# Patient Record
Sex: MALE | Race: WHITE | NOT HISPANIC OR LATINO | Employment: OTHER | URBAN - METROPOLITAN AREA
[De-identification: names, ages, dates, MRNs, and addresses within clinical notes are randomized per-mention and may not be internally consistent; named-entity substitution may affect disease eponyms.]

---

## 2018-02-02 ENCOUNTER — CONVERSION ENCOUNTER (OUTPATIENT)
Dept: FAMILY MEDICINE CLINIC | Facility: CLINIC | Age: 63
End: 2018-02-02

## 2018-02-02 LAB
ALBUMIN SERPL-MCNC: 3.5 G/DL (ref 3.6–5.1)
ALBUMIN/GLOB SERPL: ABNORMAL {RATIO} (ref 1–2.1)
ALP SERPL-CCNC: 67 UNITS/L (ref 40–115)
ALT SERPL-CCNC: 17 UNITS/L (ref 9–60)
AST SERPL-CCNC: 16 UNITS/L (ref 10–35)
BILIRUB SERPL-MCNC: 0.6 MG/DL (ref 0.2–1.2)
BUN SERPL-MCNC: 15 MG/DL (ref 7–25)
BUN/CREAT SERPL: ABNORMAL (ref 6–22)
CALCIUM SERPL-MCNC: 9.3 MG/DL (ref 8.6–10.2)
CHLORIDE SERPL-SCNC: 103 MMOL/L (ref 98–110)
CONV % HGB A1C: ABNORMAL %
CONV CO2: 26 MMOL/L (ref 21–33)
CONV TOTAL PROTEIN: 6.6 G/DL (ref 6.2–8.3)
CREAT UR-MCNC: 1.2 MG/DL (ref 0.76–1.46)
GLOBULIN UR ELPH-MCNC: ABNORMAL G/DL (ref 2.1–3.7)
GLUCOSE SERPL-MCNC: 95 MG/DL (ref 65–99)
POTASSIUM SERPL-SCNC: 4.8 MMOL/L (ref 3.5–5.3)
PSA SERPL-MCNC: 3.1 NG/ML
SODIUM SERPL-SCNC: 138 MMOL/L (ref 135–146)

## 2018-10-03 ENCOUNTER — HOSPITAL ENCOUNTER (OUTPATIENT)
Dept: ORTHOPEDIC SURGERY | Facility: CLINIC | Age: 63
Discharge: HOME | End: 2018-10-03
Attending: ORTHOPAEDIC SURGERY | Admitting: ORTHOPAEDIC SURGERY

## 2018-11-14 ENCOUNTER — HOSPITAL ENCOUNTER (OUTPATIENT)
Dept: ORTHOPEDIC SURGERY | Facility: CLINIC | Age: 63
Discharge: HOME | End: 2018-11-14
Attending: ORTHOPAEDIC SURGERY | Admitting: ORTHOPAEDIC SURGERY

## 2019-02-13 ENCOUNTER — HOSPITAL ENCOUNTER (OUTPATIENT)
Dept: ORTHOPEDIC SURGERY | Facility: CLINIC | Age: 64
Discharge: HOME | End: 2019-02-13
Attending: ORTHOPAEDIC SURGERY | Admitting: ORTHOPAEDIC SURGERY

## 2019-06-25 RX ORDER — BLOOD SUGAR DIAGNOSTIC
STRIP MISCELLANEOUS
COMMUNITY
Start: 2018-12-13

## 2019-06-25 RX ORDER — BENAZEPRIL HYDROCHLORIDE 40 MG/1
TABLET, FILM COATED ORAL
Qty: 30 TABLET | OUTPATIENT
Start: 2019-06-25

## 2019-06-25 RX ORDER — PRAVASTATIN SODIUM 20 MG
TABLET ORAL EVERY 24 HOURS
COMMUNITY
Start: 2019-02-14 | End: 2019-08-09 | Stop reason: SDUPTHER

## 2019-06-25 RX ORDER — HYDROCHLOROTHIAZIDE 25 MG/1
TABLET ORAL EVERY 24 HOURS
COMMUNITY
Start: 2018-05-19 | End: 2019-09-05 | Stop reason: SDUPTHER

## 2019-06-25 RX ORDER — PRAVASTATIN SODIUM 20 MG
TABLET ORAL
Qty: 90 TABLET | OUTPATIENT
Start: 2019-06-25

## 2019-06-25 RX ORDER — BENAZEPRIL HYDROCHLORIDE 40 MG/1
TABLET, FILM COATED ORAL EVERY 24 HOURS
COMMUNITY
Start: 2019-02-14 | End: 2019-06-27 | Stop reason: SDUPTHER

## 2019-06-25 RX ORDER — TAMSULOSIN HYDROCHLORIDE 0.4 MG/1
1 CAPSULE ORAL EVERY 24 HOURS
COMMUNITY
Start: 2017-03-04 | End: 2019-09-26 | Stop reason: SDUPTHER

## 2019-06-25 RX ORDER — CLOPIDOGREL BISULFATE 75 MG/1
TABLET ORAL EVERY 24 HOURS
COMMUNITY
Start: 2018-05-19 | End: 2020-02-03 | Stop reason: SDUPTHER

## 2019-06-25 RX ORDER — HUMAN INSULIN 100 [USP'U]/ML
INJECTION, SUSPENSION SUBCUTANEOUS
Qty: 30 ML | OUTPATIENT
Start: 2019-06-25

## 2019-06-25 RX ORDER — KETOCONAZOLE 20 MG/G
CREAM TOPICAL
COMMUNITY
Start: 2018-10-19 | End: 2021-06-13 | Stop reason: HOSPADM

## 2019-06-25 RX ORDER — TAMSULOSIN HYDROCHLORIDE 0.4 MG/1
CAPSULE ORAL
Qty: 30 CAPSULE | OUTPATIENT
Start: 2019-06-25

## 2019-06-25 RX ORDER — DOCUSATE SODIUM 100 MG/1
1 CAPSULE, LIQUID FILLED ORAL EVERY 24 HOURS
COMMUNITY
Start: 2019-02-14 | End: 2019-12-30 | Stop reason: SDUPTHER

## 2019-06-27 NOTE — TELEPHONE ENCOUNTER
Pt needing a refill of his benazepril 40mg, qd, out of medication, needs sent to [facility]. Upcoming appt with you on 7/8/19

## 2019-06-28 RX ORDER — BENAZEPRIL HYDROCHLORIDE 40 MG/1
40 TABLET, FILM COATED ORAL EVERY 24 HOURS
Qty: 30 TABLET | Refills: 0 | Status: SHIPPED | OUTPATIENT
Start: 2019-06-28 | End: 2019-10-04 | Stop reason: SDUPTHER

## 2019-07-08 ENCOUNTER — OFFICE VISIT (OUTPATIENT)
Dept: FAMILY MEDICINE CLINIC | Facility: CLINIC | Age: 64
End: 2019-07-08

## 2019-07-08 VITALS
SYSTOLIC BLOOD PRESSURE: 180 MMHG | BODY MASS INDEX: 35.93 KG/M2 | TEMPERATURE: 98.4 F | OXYGEN SATURATION: 97 % | WEIGHT: 251 LBS | DIASTOLIC BLOOD PRESSURE: 60 MMHG | HEIGHT: 70 IN

## 2019-07-08 DIAGNOSIS — I63.439 CEREBROVASCULAR ACCIDENT (CVA) DUE TO EMBOLISM OF POSTERIOR CEREBRAL ARTERY, UNSPECIFIED BLOOD VESSEL LATERALITY (HCC): ICD-10-CM

## 2019-07-08 DIAGNOSIS — Z12.5 SCREENING FOR MALIGNANT NEOPLASM OF PROSTATE: ICD-10-CM

## 2019-07-08 DIAGNOSIS — E78.2 MIXED HYPERLIPIDEMIA: ICD-10-CM

## 2019-07-08 DIAGNOSIS — F33.1 MODERATE EPISODE OF RECURRENT MAJOR DEPRESSIVE DISORDER (HCC): ICD-10-CM

## 2019-07-08 DIAGNOSIS — Z79.4 TYPE 2 DIABETES MELLITUS WITH COMPLICATION, WITH LONG-TERM CURRENT USE OF INSULIN (HCC): ICD-10-CM

## 2019-07-08 DIAGNOSIS — I25.119 CORONARY ARTERY DISEASE INVOLVING NATIVE CORONARY ARTERY OF NATIVE HEART WITH ANGINA PECTORIS (HCC): ICD-10-CM

## 2019-07-08 DIAGNOSIS — I10 BENIGN ESSENTIAL HYPERTENSION: Primary | ICD-10-CM

## 2019-07-08 DIAGNOSIS — E11.8 TYPE 2 DIABETES MELLITUS WITH COMPLICATION, WITH LONG-TERM CURRENT USE OF INSULIN (HCC): ICD-10-CM

## 2019-07-08 PROBLEM — I63.9 CEREBROVASCULAR ACCIDENT (CVA) (HCC): Status: ACTIVE | Noted: 2018-01-03

## 2019-07-08 PROBLEM — R26.9 ABNORMAL GAIT: Status: ACTIVE | Noted: 2017-12-04

## 2019-07-08 PROBLEM — Z13.89 ENCOUNTER FOR SCREENING FOR OTHER DISORDER: Status: RESOLVED | Noted: 2017-12-04 | Resolved: 2019-07-08

## 2019-07-08 PROBLEM — Z13.89 ENCOUNTER FOR SCREENING FOR OTHER DISORDER: Status: ACTIVE | Noted: 2017-12-04

## 2019-07-08 PROBLEM — E11.9 TYPE 2 DIABETES MELLITUS (HCC): Status: ACTIVE | Noted: 2019-07-08

## 2019-07-08 PROBLEM — H53.469 HOMONYMOUS HEMIANOPIA: Status: ACTIVE | Noted: 2017-12-04

## 2019-07-08 PROBLEM — I25.10 CORONARY ARTERY DISEASE: Status: ACTIVE | Noted: 2019-07-08

## 2019-07-08 PROBLEM — B35.1 ONYCHOMYCOSIS: Status: ACTIVE | Noted: 2018-10-19

## 2019-07-08 PROBLEM — G25.0 TREMOR, ESSENTIAL: Status: ACTIVE | Noted: 2017-12-04

## 2019-07-08 PROBLEM — E66.9 OBESITY: Status: ACTIVE | Noted: 2019-07-08

## 2019-07-08 PROBLEM — E78.5 HYPERLIPIDEMIA: Status: ACTIVE | Noted: 2019-07-08

## 2019-07-08 PROBLEM — F33.9 EPISODE OF RECURRENT MAJOR DEPRESSIVE DISORDER (HCC): Status: ACTIVE | Noted: 2019-07-08

## 2019-07-08 PROBLEM — M25.511 PAIN IN RIGHT SHOULDER: Status: ACTIVE | Noted: 2018-10-03

## 2019-07-08 PROBLEM — E53.8 VITAMIN B12 DEFICIENCY: Status: ACTIVE | Noted: 2018-02-01

## 2019-07-08 PROBLEM — Z23 NEED FOR PROPHYLACTIC VACCINATION AGAINST STREPTOCOCCUS PNEUMONIAE (PNEUMOCOCCUS): Status: ACTIVE | Noted: 2018-10-19

## 2019-07-08 PROBLEM — Z23 NEED FOR PROPHYLACTIC VACCINATION AGAINST STREPTOCOCCUS PNEUMONIAE (PNEUMOCOCCUS): Status: RESOLVED | Noted: 2018-10-19 | Resolved: 2019-07-08

## 2019-07-08 PROBLEM — M25.511 PAIN IN RIGHT SHOULDER: Status: RESOLVED | Noted: 2018-10-03 | Resolved: 2019-07-08

## 2019-07-08 PROBLEM — B35.1 ONYCHOMYCOSIS: Status: RESOLVED | Noted: 2018-10-19 | Resolved: 2019-07-08

## 2019-07-08 PROBLEM — G25.2 RESTING TREMOR: Status: ACTIVE | Noted: 2017-12-04

## 2019-07-08 PROCEDURE — 99214 OFFICE O/P EST MOD 30 MIN: CPT | Performed by: FAMILY MEDICINE

## 2019-07-08 RX ORDER — ASPIRIN 81 MG/1
81 TABLET ORAL DAILY
COMMUNITY

## 2019-07-08 RX ORDER — ESCITALOPRAM OXALATE 10 MG/1
10 TABLET ORAL DAILY
Qty: 30 TABLET | Refills: 0 | Status: SHIPPED | OUTPATIENT
Start: 2019-07-08 | End: 2019-10-04 | Stop reason: SDUPTHER

## 2019-07-08 NOTE — PROGRESS NOTES
Subjective   Isaias Molina is a 63 y.o. male.   Chief Complaint   Patient presents with   • Diabetes     f/u,         History of Present Illness   Pt is here to establish care.  Pt is f/u on his diabetes that was managed by Dr. Arellano. Pt states his sugars have been up slightly.  Pt does not keep a log.  No recent sx of hypoglycemia.   Pt is needing to get diabetic shoes. Pt sees Dr. Lea, podiatry.  No labs in long time.    Pt has had a stroke November 2017. Pt states has been experiencing balance issues when he first gets going.  Has vision impairment residual of CVA    CAD - Pt states that the last time he saw Dr. Arellano, he told her he stopped seeing his cardiologist .  Patient is now experiencing chest discomfort occasionally.  Pt states episodes once every couple weeks.  Pt states he cant pinpoint what triggers it, but he feels mostly it happens when he awakes in the morning.  Had a cath with stents x 2 in 2011.    HTN - has element of white coat HTN.  Was out of benazapril.  Back on it for a few days.    Depression - h/o this.  Took lexapro for 18 months.  Stopped it about 1 year ago.  Feelings of hopelessness, helplessness, self -doubt are recurring.  Grandchildren have severe kidney disease - 2nd is waiting on kidney transplant right now.  Feels it's time to get back on lexapro.    Patient Active Problem List    Diagnosis Date Noted   • Coronary artery disease 07/08/2019     Note Last Updated: 7/8/2019     On cath with stent x 2 in 2011     • Hyperlipidemia 07/08/2019   • Obesity 07/08/2019   • Type 2 diabetes mellitus (CMS/HCC) 07/08/2019   • Episode of recurrent major depressive disorder (CMS/[facility]) 07/08/2019   • Benign essential hypertension 02/01/2018   • Vitamin B12 deficiency 02/01/2018   • Cerebrovascular accident (CVA) (CMS/[facility]) 01/03/2018     Note Last Updated: 7/8/2019     Occipital.  Ischemic.  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   • Resting tremor 12/04/2017   • Tremor, essential 12/04/2017           Past Surgical History:   Procedure Laterality Date   • CATARACT EXTRACTION WITH INTRAOCULAR LENS IMPLANT Bilateral    • CORONARY ANGIOPLASTY WITH STENT PLACEMENT  2011    PTCA - LCx & RCA with stent   • ROTATOR CUFF REPAIR Right    • TOTAL SHOULDER REVERSE ARTHROPLASTY Right     after fracture 2018       Current Outpatient Medications:   •  aspirin 81 MG EC tablet, Take 81 mg by mouth Daily., Disp: , Rfl:   •  benazepril (LOTENSIN) 40 MG tablet, Take 1 tablet by mouth Daily., Disp: 30 tablet, Rfl: 0  •  clopidogrel (PLAVIX) 75 MG tablet, Daily., Disp: , Rfl:   •  Cyanocobalamin (B-12 COMPLIANCE INJECTION) 1000 MCG/ML kit, B-12 COMPLIANCE INJECTION 1000 MCG/ML KIT, Disp: , Rfl:   •  docusate sodium (COLACE) 100 MG capsule, 1 capsule Daily., Disp: , Rfl:   •  glucose blood (ACCU-CHEK ROLANDO) test strip, ACCU-CHEK ROLANDO IN VITRO STRIP, Disp: , Rfl:   •  hydrochlorothiazide (HYDRODIURIL) 25 MG tablet, Daily., Disp: , Rfl:   •  insulin NPH-insulin regular (NOVOLIN 70/30 RELION) (70-30) 100 UNIT/ML injection, NOVOLIN 70/30 RELION (70-30) 100 UNIT/ML SUSP, Disp: , Rfl:   •  Insulin Syringe-Needle U-100 (ULTICARE INSULIN SYRINGE) 31G X 5/16\" 0.5 ML misc, ULTICARE INSULIN SYRINGE 31G X 5/16\" 0.5 ML, Disp: , Rfl:   •  ketoconazole (NIZORAL) 2 % cream, KETOCONAZOLE 2 % CREA, Disp: , Rfl:   •  metFORMIN (GLUCOPHAGE) 500 MG tablet, Take 500 mg by mouth 2 (Two) Times a Day With Meals., Disp: , Rfl:   •  pravastatin (PRAVACHOL) 20 MG tablet, Daily., Disp: , Rfl:   •  tamsulosin (FLOMAX) 0.4 MG capsule 24 hr capsule, 1 capsule Daily., Disp: , Rfl:   •  escitalopram (LEXAPRO) 10 MG tablet, Take 1 tablet by mouth Daily., Disp: 30 tablet, Rfl: 0  Allergies   Allergen Reactions   • Shellfish Allergy Nausea And Vomiting     Social History     Socioeconomic History   • Marital status:      Spouse name: Not on file   • Number of children: " "Not on file   • Years of education: Not on file   • Highest education level: Not on file   Tobacco Use   • Smoking status: Never Smoker   • Smokeless tobacco: Never Used     Family History   Problem Relation Age of Onset   • Cerebral aneurysm Father    • Peripheral vascular disease Brother    • Diabetes type II Brother      The following portions of the patient's history were reviewed and updated as appropriate: allergies, current medications, past family history, past medical history, past social history, past surgical history and problem list.    Review of Systems   Constitutional: Positive for fatigue. Negative for chills, diaphoresis and fever.   HENT: Negative for ear pain and sore throat.    Eyes: Positive for blurred vision and visual disturbance.   Respiratory: Negative for cough, choking, shortness of breath and wheezing.    Cardiovascular: Positive for chest pain (fleeting - occurs random times). Negative for palpitations and leg swelling.   Gastrointestinal: Negative for abdominal pain, diarrhea, nausea and vomiting.   Musculoskeletal: Positive for gait problem. Negative for myalgias.   Skin: Negative for rash.   Neurological: Negative for facial asymmetry, headache, memory problem and confusion.   Hematological: Bruises/bleeds easily (med related).   Psychiatric/Behavioral: Positive for depressed mood. Negative for hallucinations and stress.       Objective   /60 (BP Location: Right arm, Patient Position: Sitting, Cuff Size: Adult)   Temp 98.4 °F (36.9 °C) (Oral)   Ht 177.8 cm (70\")   Wt 114 kg (251 lb)   SpO2 97%   BMI 36.01 kg/m²   Physical Exam   Constitutional: He is oriented to person, place, and time. He appears well-developed and well-nourished.   HENT:   Head: Normocephalic and atraumatic.   Mouth/Throat: Oropharynx is clear and moist.   Eyes: Conjunctivae and EOM are normal. Pupils are equal, round, and reactive to light.   Neck: Neck supple. No JVD present. No thyromegaly present. "   Cardiovascular: Normal rate, regular rhythm, normal heart sounds and intact distal pulses.   No murmur heard.  Pulmonary/Chest: Effort normal and breath sounds normal. No respiratory distress. He has no wheezes. He has no rales. He exhibits no tenderness.   Abdominal: Soft. He exhibits no distension and no mass. There is no tenderness. There is no rebound and no guarding.   Musculoskeletal: Normal range of motion. He exhibits no edema.    Isaias had a diabetic foot exam performed today.   During the foot exam he had a monofilament test performed.    Neurological Sensory Findings -  Altered sharp/dull right ankle/foot discrimination and altered sharp/dull left ankle/foot discrimination.  Vascular Status -  His right foot exhibits abnormal foot vasculature  (pulses only 1+ ). His right foot exhibits no edema. His left foot exhibits abnormal foot vasculature  (pulses only 1+ ). His left foot exhibits no edema.  Skin Integrity  -  His right foot skin is intact.His left foot skin is intact (some thin callous over heels developing)..  Lymphadenopathy:     He has no cervical adenopathy.   Neurological: He is alert and oriented to person, place, and time.   Skin: Skin is warm. No rash noted.   Psychiatric: He has a normal mood and affect. His behavior is normal.               Assessment/Plan   Diagnoses and all orders for this visit:    1. Benign essential hypertension (Primary)  -     Comprehensive Metabolic Panel    2. Cerebrovascular accident (CVA) due to embolism of posterior cerebral artery, unspecified blood vessel laterality (CMS/HCC)    3. Coronary artery disease involving native coronary artery of native heart with angina pectoris (CMS/HCC)  -     CBC & Differential  -     Ambulatory Referral to Cardiology    4. Mixed hyperlipidemia    5. Type 2 diabetes mellitus with complication, with long-term current use of insulin (CMS/HCC)  -     Hemoglobin A1c  -     Lipid Panel  -     TSH  -     MicroAlbumin, Urine,  Random - Urine, Clean Catch    6. Moderate episode of recurrent major depressive disorder (CMS/HCC)  -     escitalopram (LEXAPRO) 10 MG tablet; Take 1 tablet by mouth Daily.  Dispense: 30 tablet; Refill: 0    7. Screening for malignant neoplasm of prostate  -     PSA SCREENING; Future    Keep checking blood pressure at home.  Call if systolic blood pressure stays above 130.  Continue all current medications.  Check labs as above.  Initiate referral to cardiology.  His stents are approaching 10 years old.  Restart lispro 10 mg/day.  Recheck with me here in 1 month to reevaluate his mood.  Follow-up by phone with results of his labs.  By medic follow-up with me in 3 months.  Forms completed for diabetic shoes attached elsewhere in the chart.  Call for problems or concerns before next visit.             Return in about 4 weeks (around 8/5/2019).

## 2019-07-09 LAB
ALBUMIN SERPL-MCNC: 3.5 G/DL (ref 3.6–4.8)
ALBUMIN/GLOB SERPL: 1 {RATIO} (ref 1.2–2.2)
ALP SERPL-CCNC: 107 IU/L (ref 39–117)
ALT SERPL-CCNC: 15 IU/L (ref 0–44)
AST SERPL-CCNC: 18 IU/L (ref 0–40)
BASOPHILS # BLD AUTO: 0.1 X10E3/UL (ref 0–0.2)
BASOPHILS NFR BLD AUTO: 0 %
BILIRUB SERPL-MCNC: 0.4 MG/DL (ref 0–1.2)
BUN SERPL-MCNC: 22 MG/DL (ref 8–27)
BUN/CREAT SERPL: 14 (ref 10–24)
CALCIUM SERPL-MCNC: 9 MG/DL (ref 8.6–10.2)
CHLORIDE SERPL-SCNC: 102 MMOL/L (ref 96–106)
CHOLEST SERPL-MCNC: 331 MG/DL (ref 100–199)
CO2 SERPL-SCNC: 18 MMOL/L (ref 20–29)
CREAT SERPL-MCNC: 1.62 MG/DL (ref 0.76–1.27)
EOSINOPHIL # BLD AUTO: 0.4 X10E3/UL (ref 0–0.4)
EOSINOPHIL NFR BLD AUTO: 4 %
ERYTHROCYTE [DISTWIDTH] IN BLOOD BY AUTOMATED COUNT: 15 % (ref 12.3–15.4)
GLOBULIN SER CALC-MCNC: 3.5 G/DL (ref 1.5–4.5)
GLUCOSE SERPL-MCNC: 171 MG/DL (ref 65–99)
HBA1C MFR BLD: 7.6 % (ref 4.8–5.6)
HCT VFR BLD AUTO: 44.3 % (ref 37.5–51)
HDLC SERPL-MCNC: 23 MG/DL
HGB BLD-MCNC: 15.2 G/DL (ref 13–17.7)
IMM GRANULOCYTES # BLD AUTO: 0.2 X10E3/UL (ref 0–0.1)
IMM GRANULOCYTES NFR BLD AUTO: 2 %
LDLC SERPL CALC-MCNC: ABNORMAL MG/DL (ref 0–99)
LYMPHOCYTES # BLD AUTO: 2.6 X10E3/UL (ref 0.7–3.1)
LYMPHOCYTES NFR BLD AUTO: 23 %
MCH RBC QN AUTO: 28.6 PG (ref 26.6–33)
MCHC RBC AUTO-ENTMCNC: 34.3 G/DL (ref 31.5–35.7)
MCV RBC AUTO: 83 FL (ref 79–97)
MICROALBUMIN UR-MCNC: 4677.7 UG/ML
MONOCYTES # BLD AUTO: 0.8 X10E3/UL (ref 0.1–0.9)
MONOCYTES NFR BLD AUTO: 7 %
NEUTROPHILS # BLD AUTO: 7.3 X10E3/UL (ref 1.4–7)
NEUTROPHILS NFR BLD AUTO: 64 %
PLATELET # BLD AUTO: 227 X10E3/UL (ref 150–450)
POTASSIUM SERPL-SCNC: 4.6 MMOL/L (ref 3.5–5.2)
PROT SERPL-MCNC: 7 G/DL (ref 6–8.5)
RBC # BLD AUTO: 5.31 X10E6/UL (ref 4.14–5.8)
SODIUM SERPL-SCNC: 140 MMOL/L (ref 134–144)
TRIGL SERPL-MCNC: 1096 MG/DL (ref 0–149)
TSH SERPL DL<=0.005 MIU/L-ACNC: 7.06 UIU/ML (ref 0.45–4.5)
VLDLC SERPL CALC-MCNC: ABNORMAL MG/DL (ref 5–40)
WBC # BLD AUTO: 11.3 X10E3/UL (ref 3.4–10.8)

## 2019-07-13 ENCOUNTER — RESULTS ENCOUNTER (OUTPATIENT)
Dept: FAMILY MEDICINE CLINIC | Facility: CLINIC | Age: 64
End: 2019-07-13

## 2019-07-13 DIAGNOSIS — Z12.5 SCREENING FOR MALIGNANT NEOPLASM OF PROSTATE: ICD-10-CM

## 2019-08-09 DIAGNOSIS — E11.8 TYPE 2 DIABETES MELLITUS WITH COMPLICATION, WITH LONG-TERM CURRENT USE OF INSULIN (HCC): ICD-10-CM

## 2019-08-09 DIAGNOSIS — Z79.4 TYPE 2 DIABETES MELLITUS WITH COMPLICATION, WITH LONG-TERM CURRENT USE OF INSULIN (HCC): ICD-10-CM

## 2019-08-09 RX ORDER — HUMAN INSULIN 100 [USP'U]/ML
INJECTION, SUSPENSION SUBCUTANEOUS
Qty: 30 ML | Refills: 5 | Status: SHIPPED | OUTPATIENT
Start: 2019-08-09 | End: 2020-08-17

## 2019-08-09 RX ORDER — PRAVASTATIN SODIUM 20 MG
TABLET ORAL
Qty: 90 TABLET | Refills: 3 | Status: SHIPPED | OUTPATIENT
Start: 2019-08-09 | End: 2020-01-20

## 2019-09-06 RX ORDER — HYDROCHLOROTHIAZIDE 25 MG/1
TABLET ORAL
Qty: 90 TABLET | Refills: 3 | Status: SHIPPED | OUTPATIENT
Start: 2019-09-06 | End: 2020-12-10

## 2019-09-10 ENCOUNTER — OFFICE VISIT (OUTPATIENT)
Dept: CARDIOLOGY | Facility: CLINIC | Age: 64
End: 2019-09-10

## 2019-09-10 VITALS
WEIGHT: 251 LBS | OXYGEN SATURATION: 96 % | BODY MASS INDEX: 35.93 KG/M2 | SYSTOLIC BLOOD PRESSURE: 151 MMHG | DIASTOLIC BLOOD PRESSURE: 86 MMHG | HEART RATE: 106 BPM | HEIGHT: 70 IN

## 2019-09-10 DIAGNOSIS — I63.419 CEREBROVASCULAR ACCIDENT (CVA) DUE TO EMBOLISM OF MIDDLE CEREBRAL ARTERY, UNSPECIFIED BLOOD VESSEL LATERALITY (HCC): ICD-10-CM

## 2019-09-10 DIAGNOSIS — E78.00 PURE HYPERCHOLESTEROLEMIA: ICD-10-CM

## 2019-09-10 DIAGNOSIS — I25.119 CORONARY ARTERY DISEASE INVOLVING NATIVE CORONARY ARTERY OF NATIVE HEART WITH ANGINA PECTORIS (HCC): Primary | ICD-10-CM

## 2019-09-10 DIAGNOSIS — I20.9 ANGINA PECTORIS (HCC): ICD-10-CM

## 2019-09-10 DIAGNOSIS — E11.9 TYPE 2 DIABETES MELLITUS WITHOUT COMPLICATION, WITHOUT LONG-TERM CURRENT USE OF INSULIN (HCC): ICD-10-CM

## 2019-09-10 DIAGNOSIS — I10 ESSENTIAL HYPERTENSION: ICD-10-CM

## 2019-09-10 PROCEDURE — 99214 OFFICE O/P EST MOD 30 MIN: CPT | Performed by: INTERNAL MEDICINE

## 2019-09-10 NOTE — PROGRESS NOTES
"    Subjective:     Encounter Date:09/10/2019      Patient ID: Isaias Molina is a 63 y.o. male.    Chief Complaint:  History of Present Illness 63-year-old white male with history of coronary status post stent placement history of hypertension diabetes hyperlipidemia CVA in the past presented to my office for a new consultation.  Patient has been having symptoms of shortness of breath especially with exertion.  No complaints of any chest pain but has some tightness on occasions.  No complaints of any PND orthopnea.  No palpitation but has some dizziness.  No syncope or swelling of the feet.  Patient has been taking his medicines regularly.  He is legally blind and does not exercise very well.  He follows a good diet.  He does not smoke.  /86 (BP Location: Left arm, Patient Position: Sitting)   Pulse 106   Ht 177.8 cm (70\")   Wt 114 kg (251 lb)   SpO2 96%   BMI 36.01 kg/m²     The following portions of the patient's history were reviewed and updated as appropriate: allergies, current medications, past family history, past medical history, past social history, past surgical history and problem list.  Past Medical History:   Diagnosis Date   • Cellulitis 10/15/2013   • Encounter for screening for malignant neoplasm of prostate 12/4/2017   • Encounter for screening for other disorder 12/4/2017   • Need for prophylactic vaccination against Streptococcus pneumoniae (pneumococcus) 10/19/2018   • Onychomycosis 10/19/2018   • Pain in right shoulder 10/3/2018     Past Surgical History:   Procedure Laterality Date   • CATARACT EXTRACTION WITH INTRAOCULAR LENS IMPLANT Bilateral    • CORONARY ANGIOPLASTY WITH STENT PLACEMENT  2011    PTCA - LCx & RCA with stent   • ROTATOR CUFF REPAIR Right    • TOTAL SHOULDER REVERSE ARTHROPLASTY Right     after fracture 2018     Social History     Socioeconomic History   • Marital status:      Spouse name: Not on file   • Number of children: Not on file   • Years of " "education: Not on file   • Highest education level: Not on file   Tobacco Use   • Smoking status: Never Smoker   • Smokeless tobacco: Never Used     Family History   Problem Relation Age of Onset   • Cerebral aneurysm Father    • Peripheral vascular disease Brother    • Diabetes type II Brother        Current Outpatient Medications:   •  aspirin 81 MG EC tablet, Take 81 mg by mouth Daily., Disp: , Rfl:   •  benazepril (LOTENSIN) 40 MG tablet, Take 1 tablet by mouth Daily., Disp: 30 tablet, Rfl: 0  •  clopidogrel (PLAVIX) 75 MG tablet, Daily., Disp: , Rfl:   •  Cyanocobalamin (B-12 COMPLIANCE INJECTION) 1000 MCG/ML kit, B-12 COMPLIANCE INJECTION 1000 MCG/ML KIT, Disp: , Rfl:   •  docusate sodium (COLACE) 100 MG capsule, 1 capsule Daily., Disp: , Rfl:   •  escitalopram (LEXAPRO) 10 MG tablet, Take 1 tablet by mouth Daily., Disp: 30 tablet, Rfl: 0  •  glucose blood (ACCU-CHEK ROLANDO) test strip, ACCU-CHEK ROLANDO IN VITRO STRIP, Disp: , Rfl:   •  hydrochlorothiazide (HYDRODIURIL) 25 MG tablet, TAKE ONE TABLET BY MOUTH EVERY DAY, Disp: 90 tablet, Rfl: 3  •  Insulin Syringe-Needle U-100 (ULTICARE INSULIN SYRINGE) 31G X 5/16\" 0.5 ML misc, ULTICARE INSULIN SYRINGE 31G X 5/16\" 0.5 ML, Disp: , Rfl:   •  ketoconazole (NIZORAL) 2 % cream, KETOCONAZOLE 2 % CREA, Disp: , Rfl:   •  metFORMIN (GLUCOPHAGE) 500 MG tablet, TAKE ONE TABLET BY MOUTH TWICE DAILY with meals, Disp: 180 tablet, Rfl: 3  •  NOVOLIN 70/30 (70-30) 100 UNIT/ML injection, inject 50 units SUBCUTANEOUSLY EVERY MORNING AND 40 units SUBCUTANEOUSLY EVERY EVENING, Disp: 30 mL, Rfl: 5  •  pravastatin (PRAVACHOL) 20 MG tablet, TAKE ONE TABLET BY MOUTH AT BEDTIME, Disp: 90 tablet, Rfl: 3  •  tamsulosin (FLOMAX) 0.4 MG capsule 24 hr capsule, 1 capsule Daily., Disp: , Rfl:   Allergies   Allergen Reactions   • Shellfish Allergy Nausea And Vomiting       Review of Systems   Constitution: Negative for fever and malaise/fatigue.   HENT: Negative for ear pain and nosebleeds.  "   Eyes: Negative for blurred vision and double vision.   Cardiovascular: Negative for chest pain, dyspnea on exertion and palpitations.   Respiratory: Positive for shortness of breath. Negative for cough.    Skin: Negative for rash.   Musculoskeletal: Negative for joint pain.   Gastrointestinal: Negative for abdominal pain, nausea and vomiting.   Neurological: Positive for dizziness and light-headedness. Negative for focal weakness and headaches.   Psychiatric/Behavioral: Negative for depression. The patient is not nervous/anxious.    All other systems reviewed and are negative.             Objective:     Physical Exam   Constitutional: He appears well-developed and well-nourished.   HENT:   Head: Normocephalic and atraumatic.   Eyes: No scleral icterus.   Patient is legally blind   Neck: Normal range of motion. Neck supple. No JVD present. Carotid bruit is not present.   Cardiovascular: Normal rate, regular rhythm, S1 normal, S2 normal, normal heart sounds and intact distal pulses. PMI is not displaced.   Pulmonary/Chest: Effort normal and breath sounds normal. He has no wheezes. He has no rales.   Abdominal: Soft. Bowel sounds are normal.   Musculoskeletal: Normal range of motion.   Neurological: He is alert. He has normal strength.   No focal deficits   Skin: Skin is warm and dry. No rash noted.   Psychiatric: He has a normal mood and affect.       Procedures    Lab Review:       Assessment:          Diagnosis Plan   1. Coronary artery disease involving native coronary artery of native heart with angina pectoris (CMS/[facility])     2. Essential hypertension     3. Pure hypercholesterolemia     4. Type 2 diabetes mellitus without complication, without long-term current use of insulin (CMS/[facility])     5. Angina pectoris (CMS/HCC)     6. Cerebrovascular accident (CVA) due to embolism of middle cerebral artery, unspecified blood vessel laterality (CMS/[facility])            Plan:       Patient has been having symptoms of shortness  of breath which are equal to angina because of his diabetes  Patient will have an echocardiogram and a stress my study to rule out ischemia  Patient has history of coronary artery stent placement in the past  Patient's sugar levels are followed by primary care doctor per  Patient's blood pressure and heart are stable per  Patient also had a CVA in the past with blindness.  Patient's lipid levels are followed by primary care doctor

## 2019-09-26 RX ORDER — TAMSULOSIN HYDROCHLORIDE 0.4 MG/1
CAPSULE ORAL
Qty: 30 CAPSULE | Refills: 0 | Status: SHIPPED | OUTPATIENT
Start: 2019-09-26 | End: 2019-11-06 | Stop reason: SDUPTHER

## 2019-10-04 DIAGNOSIS — F33.1 MODERATE EPISODE OF RECURRENT MAJOR DEPRESSIVE DISORDER (HCC): ICD-10-CM

## 2019-10-04 RX ORDER — ESCITALOPRAM OXALATE 10 MG/1
TABLET ORAL
Qty: 30 TABLET | Refills: 0 | Status: SHIPPED | OUTPATIENT
Start: 2019-10-04 | End: 2019-11-06 | Stop reason: SDUPTHER

## 2019-10-04 RX ORDER — BENAZEPRIL HYDROCHLORIDE 40 MG/1
TABLET, FILM COATED ORAL
Qty: 30 TABLET | Refills: 0 | Status: SHIPPED | OUTPATIENT
Start: 2019-10-04 | End: 2020-04-29 | Stop reason: SDUPTHER

## 2019-11-06 DIAGNOSIS — F33.1 MODERATE EPISODE OF RECURRENT MAJOR DEPRESSIVE DISORDER (HCC): ICD-10-CM

## 2019-11-06 RX ORDER — TAMSULOSIN HYDROCHLORIDE 0.4 MG/1
CAPSULE ORAL
Qty: 30 CAPSULE | Refills: 0 | Status: SHIPPED | OUTPATIENT
Start: 2019-11-06 | End: 2019-12-26

## 2019-11-06 RX ORDER — ESCITALOPRAM OXALATE 10 MG/1
TABLET ORAL
Qty: 30 TABLET | Refills: 0 | Status: SHIPPED | OUTPATIENT
Start: 2019-11-06 | End: 2019-12-26

## 2019-11-06 NOTE — TELEPHONE ENCOUNTER
Received refill request for patient's Lexapro and Flomax.     Last OV: 09-10-19  Next OV: none scheduled  Last Labs: 7-8-19    Meds are loaded and ready to send once approved. Thanks.

## 2019-11-06 NOTE — TELEPHONE ENCOUNTER
Please call patient and have him schedule appointment within a month.  I have not seen him since July, I will continue to refill medicines unless I am able to see him.  Thanks

## 2019-11-08 NOTE — TELEPHONE ENCOUNTER
Called patient on [phone]. No answer. Unable to LVM as to where phone just kept ringing.     Called [phone], which is patient's son's [place] phone #. Per HIPAA, may leave VM.  left advising for [place] to let patient know that patient is needing to schedule an appt within the next month with provider and to call office at earliest convenience.

## 2019-11-25 ENCOUNTER — OFFICE VISIT (OUTPATIENT)
Dept: FAMILY MEDICINE CLINIC | Facility: CLINIC | Age: 64
End: 2019-11-25

## 2019-11-25 VITALS
HEART RATE: 74 BPM | WEIGHT: 249 LBS | OXYGEN SATURATION: 98 % | RESPIRATION RATE: 18 BRPM | HEIGHT: 70 IN | SYSTOLIC BLOOD PRESSURE: 184 MMHG | TEMPERATURE: 98 F | DIASTOLIC BLOOD PRESSURE: 90 MMHG | BODY MASS INDEX: 35.65 KG/M2

## 2019-11-25 DIAGNOSIS — F33.1 MODERATE EPISODE OF RECURRENT MAJOR DEPRESSIVE DISORDER (HCC): ICD-10-CM

## 2019-11-25 DIAGNOSIS — N18.30 CHRONIC KIDNEY INSUFFICIENCY, STAGE 3 (MODERATE) (HCC): ICD-10-CM

## 2019-11-25 DIAGNOSIS — Z23 NEED FOR PROPHYLACTIC VACCINATION AND INOCULATION AGAINST INFLUENZA: ICD-10-CM

## 2019-11-25 DIAGNOSIS — Z12.5 ENCOUNTER FOR PROSTATE CANCER SCREENING: ICD-10-CM

## 2019-11-25 DIAGNOSIS — I10 BENIGN ESSENTIAL HYPERTENSION: ICD-10-CM

## 2019-11-25 DIAGNOSIS — E11.65 TYPE 2 DIABETES MELLITUS WITH HYPERGLYCEMIA, WITH LONG-TERM CURRENT USE OF INSULIN (HCC): Primary | ICD-10-CM

## 2019-11-25 DIAGNOSIS — E78.2 MIXED HYPERLIPIDEMIA: ICD-10-CM

## 2019-11-25 DIAGNOSIS — I25.119 CORONARY ARTERY DISEASE INVOLVING NATIVE CORONARY ARTERY OF NATIVE HEART WITH ANGINA PECTORIS (HCC): ICD-10-CM

## 2019-11-25 DIAGNOSIS — Z79.4 TYPE 2 DIABETES MELLITUS WITH HYPERGLYCEMIA, WITH LONG-TERM CURRENT USE OF INSULIN (HCC): Primary | ICD-10-CM

## 2019-11-25 PROCEDURE — 99214 OFFICE O/P EST MOD 30 MIN: CPT | Performed by: FAMILY MEDICINE

## 2019-11-25 PROCEDURE — 90674 CCIIV4 VAC NO PRSV 0.5 ML IM: CPT | Performed by: FAMILY MEDICINE

## 2019-11-25 PROCEDURE — 90471 IMMUNIZATION ADMIN: CPT | Performed by: FAMILY MEDICINE

## 2019-11-25 RX ORDER — AMLODIPINE BESYLATE 5 MG/1
5 TABLET ORAL DAILY
Qty: 30 TABLET | Refills: 3 | Status: SHIPPED | OUTPATIENT
Start: 2019-11-25 | End: 2020-04-29 | Stop reason: SDUPTHER

## 2019-11-25 NOTE — PROGRESS NOTES
Subjective   Isaias Molina is a 64 y.o. male.   Chief Complaint   Patient presents with   • Hypertension     Does not check blood pressure at home.   • Hyperlipidemia     Continues on Pravastatin    • Diabetes     Patient states that he checks BG twice weekly. Unable to recall lowest reading. Highest reading was a little over 300   • Depression     Continues on Lexapro. States that he feels it could do better some days.        History of Present Illness   Comes in today for follow-up.  Schedule says follow-up chronic problems.  I saw him back in July one and only time.  He did not follow-up in August.  Have had a couple refill requests since then and the labs that we did in July had some abnormalities.  Given all of that, I thought it best to have him come back in.    He has hypertension- does not check his blood pressure at home (machine not working).  Says he is doing fine with the medicines.  SBP usually 170-180    Hyperlipidemia- tolerating the pravastatin without myalgias.  Last labs appear to have been done back in July.  At that time total cholesterol was 331, triglycerides were 1096 and LDL was incalculable.     Elevated creatinine-creatinine was 1.62 back in July.  I asked him to come back in so we could talk about this.    Diabetes type 2.  Checks his blood sugar twice a week.  Last A1c was in July and it was 7.6.    Depression- still some feelings of feeling down despite Lexapro.  He attributes a lot of this to ongoing deficiencies in his vision and mobility due to his stroke, and chronic illnesses that his grandchildren face.    CAD - just had stress test (normal) and echo showed mild AI,MI,TI.      He had a lot of labs done August 2018, that was the last time he was hospitalized.    Would like to have a PSA done to screen for prostate cancer.      Patient Active Problem List    Diagnosis Date Noted   • Chronic kidney insufficiency, stage 3 (moderate) (CMS/HCC) 11/25/2019     Note Last Updated:  "11/25/2019     ? Due to diabetic nephropathy     • Coronary artery disease 07/08/2019     Note Last Updated: 7/8/2019     On cath with stent x 2 in 2011     • Mixed hyperlipidemia 07/08/2019   • Obesity 07/08/2019   • Type 2 diabetes mellitus (CMS/[facility]) 07/08/2019   • Episode of recurrent major depressive disorder (CMS/[facility]) 07/08/2019   • Benign essential hypertension 02/01/2018   • Vitamin B12 deficiency 02/01/2018   • Cerebrovascular accident (CVA) (CMS/[facility]) 01/03/2018     Note Last Updated: 7/8/2019     Occipital.  Ischemic.  2017 - residual vision impairment     • Abnormal gait 12/04/2017   • Homonymous hemianopia 12/04/2017   • Resting tremor 12/04/2017   • Tremor, essential 12/04/2017           Past Surgical History:   Procedure Laterality Date   • CATARACT EXTRACTION WITH INTRAOCULAR LENS IMPLANT Bilateral    • CORONARY ANGIOPLASTY WITH STENT PLACEMENT  2011    PTCA - LCx & RCA with stent   • ROTATOR CUFF REPAIR Right    • TOTAL SHOULDER REVERSE ARTHROPLASTY Right     after fracture 2018     Current Outpatient Medications on File Prior to Visit   Medication Sig   • aspirin 81 MG EC tablet Take 81 mg by mouth Daily.   • benazepril (LOTENSIN) 40 MG tablet TAKE ONE TABLET BY MOUTH EVERY DAY   • clopidogrel (PLAVIX) 75 MG tablet Daily.   • Cyanocobalamin (B-12 COMPLIANCE INJECTION) 1000 MCG/ML kit B-12 COMPLIANCE INJECTION 1000 MCG/ML KIT   • docusate sodium (COLACE) 100 MG capsule 1 capsule Daily.   • escitalopram (LEXAPRO) 10 MG tablet TAKE ONE TABLET BY MOUTH EVERY DAY   • glucose blood (ACCU-CHEK ROLANDO) test strip ACCU-CHEK ROLANDO IN VITRO STRIP   • hydrochlorothiazide (HYDRODIURIL) 25 MG tablet TAKE ONE TABLET BY MOUTH EVERY DAY   • Insulin Syringe-Needle U-100 (ULTICARE INSULIN SYRINGE) 31G X 5/16\" 0.5 ML misc ULTICARE INSULIN SYRINGE 31G X 5/16\" 0.5 ML   • ketoconazole (NIZORAL) 2 % cream KETOCONAZOLE 2 % CREA   • metFORMIN (GLUCOPHAGE) 500 MG tablet TAKE ONE TABLET BY MOUTH TWICE DAILY with meals   • " "NOVOLIN 70/30 (70-30) 100 UNIT/ML injection inject 50 units SUBCUTANEOUSLY EVERY MORNING AND 40 units SUBCUTANEOUSLY EVERY EVENING   • pravastatin (PRAVACHOL) 20 MG tablet TAKE ONE TABLET BY MOUTH AT BEDTIME   • tamsulosin (FLOMAX) 0.4 MG capsule 24 hr capsule TAKE ONE CAPSULE BY MOUTH EVERY DAY     No current facility-administered medications on file prior to visit.      Allergies   Allergen Reactions   • Shellfish Allergy Nausea And Vomiting     Social History     Socioeconomic History   • Marital status:      Spouse name: Not on file   • Number of children: Not on file   • Years of education: Not on file   • Highest education level: Not on file   Tobacco Use   • Smoking status: Never Smoker   • Smokeless tobacco: Never Used     Family History   Problem Relation Age of Onset   • Cerebral aneurysm Father    • Peripheral vascular disease Brother    • Diabetes type II Brother      The following portions of the patient's history were reviewed and updated as appropriate: allergies, current medications, past family history, past medical history, past social history, past surgical history and problem list.    Review of Systems   Constitutional: Positive for fatigue. Negative for chills and fever.   Respiratory: Negative for shortness of breath.    Cardiovascular: Negative for chest pain and leg swelling.   Gastrointestinal: Negative for abdominal pain, constipation, diarrhea, vomiting and GERD.   Genitourinary: Positive for difficulty urinating (hesitancy) and nocturia. Negative for dysuria and hematuria.       Objective   BP (!) 184/90 (BP Location: Right arm, Patient Position: Sitting, Cuff Size: Adult)   Pulse 74   Temp 98 °F (36.7 °C)   Resp 18   Ht 177.8 cm (70\")   Wt 113 kg (249 lb)   SpO2 98%   BMI 35.73 kg/m²   Physical Exam   Constitutional: He is oriented to person, place, and time. He appears well-developed and well-nourished.   Slightly disheveled.  Glasses are missing the temple on the right " side.   HENT:   Head: Normocephalic and atraumatic.   Eyes: Conjunctivae and EOM are normal.   Neck: Normal range of motion.   Cardiovascular: Normal rate, regular rhythm and normal heart sounds.   No murmur heard.  Pulmonary/Chest: Effort normal and breath sounds normal. No respiratory distress.   Musculoskeletal: Normal range of motion.   Slow, shuffling gait.   Neurological: He is alert and oriented to person, place, and time.   Skin: Skin is warm and dry. No rash noted.   Psychiatric: He has a normal mood and affect. His behavior is normal.         No visits with results within 4 Month(s) from this visit.   Latest known visit with results is:   Office Visit on 07/08/2019   Component Date Value Ref Range Status   • Glucose 07/08/2019 171* 65 - 99 mg/dL Final    Comment: Specimen received in contact with cells. No visible hemolysis  present. However GLUC may be decreased and K increased. Clinical  correlation indicated.     • BUN 07/08/2019 22  8 - 27 mg/dL Final   • Creatinine 07/08/2019 1.62* 0.76 - 1.27 mg/dL Final   • eGFR Non African Am 07/08/2019 45* >59 mL/min/1.73 Final   • eGFR African Am 07/08/2019 51* >59 mL/min/1.73 Final   • BUN/Creatinine Ratio 07/08/2019 14  10 - 24 Final   • Sodium 07/08/2019 140  134 - 144 mmol/L Final   • Potassium 07/08/2019 4.6  3.5 - 5.2 mmol/L Final    Comment: Specimen received in contact with cells. No visible hemolysis  present. However GLUC may be decreased and K increased. Clinical  correlation indicated.     • Chloride 07/08/2019 102  96 - 106 mmol/L Final   • Total CO2 07/08/2019 18* 20 - 29 mmol/L Final   • Calcium 07/08/2019 9.0  8.6 - 10.2 mg/dL Final   • Total Protein 07/08/2019 7.0  6.0 - 8.5 g/dL Final   • Albumin 07/08/2019 3.5* 3.6 - 4.8 g/dL Final   • Globulin 07/08/2019 3.5  1.5 - 4.5 g/dL Final   • A/G Ratio 07/08/2019 1.0* 1.2 - 2.2 Final   • Total Bilirubin 07/08/2019 0.4  0.0 - 1.2 mg/dL Final   • Alkaline Phosphatase 07/08/2019 107  39 - 117 IU/L Final    • AST (SGOT) 07/08/2019 18  0 - 40 IU/L Final   • ALT (SGPT) 07/08/2019 15  0 - 44 IU/L Final   • Hemoglobin A1C 07/08/2019 7.6* 4.8 - 5.6 % Final    Comment:          Prediabetes: 5.7 - 6.4           Diabetes: >6.4           Glycemic control for adults with diabetes: <7.0     • Total Cholesterol 07/08/2019 331* 100 - 199 mg/dL Final   • Triglycerides 07/08/2019 1,096* 0 - 149 mg/dL Final    Comment: Results confirmed on  dilution.     • HDL Cholesterol 07/08/2019 23* >39 mg/dL Final   • VLDL Cholesterol 07/08/2019 Comment  5 - 40 mg/dL Final    Comment: The calculation for the VLDL cholesterol is not valid when  triglyceride level is >400 mg/dL.     • LDL Cholesterol  07/08/2019 Comment  0 - 99 mg/dL Final    Comment: Triglyceride result indicated is too high for an accurate LDL  cholesterol estimation.     • TSH 07/08/2019 7.060* 0.450 - 4.500 uIU/mL Final   • Microalbumin, Urine 07/08/2019 4,677.7  Not Estab. ug/mL Final    Comment: Results confirmed on  dilution.     • WBC 07/08/2019 11.3* 3.4 - 10.8 x10E3/uL Final   • RBC 07/08/2019 5.31  4.14 - 5.80 x10E6/uL Final   • Hemoglobin 07/08/2019 15.2  13.0 - 17.7 g/dL Final   • Hematocrit 07/08/2019 44.3  37.5 - 51.0 % Final   • MCV 07/08/2019 83  79 - 97 fL Final   • MCH 07/08/2019 28.6  26.6 - 33.0 pg Final   • MCHC 07/08/2019 34.3  31.5 - 35.7 g/dL Final   • RDW 07/08/2019 15.0  12.3 - 15.4 % Final   • Platelets 07/08/2019 227  150 - 450 x10E3/uL Final   • Neutrophil Rel % 07/08/2019 64  Not Estab. % Final   • Lymphocyte Rel % 07/08/2019 23  Not Estab. % Final   • Monocyte Rel % 07/08/2019 7  Not Estab. % Final   • Eosinophil Rel % 07/08/2019 4  Not Estab. % Final   • Basophil Rel % 07/08/2019 0  Not Estab. % Final   • Neutrophils Absolute 07/08/2019 7.3* 1.4 - 7.0 x10E3/uL Final   • Lymphocytes Absolute 07/08/2019 2.6  0.7 - 3.1 x10E3/uL Final   • Monocytes Absolute 07/08/2019 0.8  0.1 - 0.9 x10E3/uL Final   • Eosinophils Absolute 07/08/2019 0.4  0.0 - 0.4  x10E3/uL Final   • Basophils Absolute 07/08/2019 0.1  0.0 - 0.2 x10E3/uL Final   • Immature Granulocyte Rel % 07/08/2019 2  Not Estab. % Final   • Immature Grans Absolute 07/08/2019 0.2* 0.0 - 0.1 x10E3/uL Final    Comment: (An elevated percentage of Immature Granulocytes has not been found  to be clinically significant as a sole clinical predictor of disease.  Does NOT include bands or blast cells.  Pregnancy associated  physiological leukocytosis may also show increased immature  granulocytes without clinical significance.)           Assessment/Plan   Diagnoses and all orders for this visit:    1. Type 2 diabetes mellitus with hyperglycemia, with long-term current use of insulin (CMS/[facility]) (Primary)  -     Comprehensive Metabolic Panel  -     Hemoglobin A1c  -     TSH  -     MicroAlbumin, Urine, Random - Urine, Clean Catch    2. Benign essential hypertension  -     amLODIPine (NORVASC) 5 MG tablet; Take 1 tablet by mouth Daily.  Dispense: 30 tablet; Refill: 3  -     CBC & Differential    3. Mixed hyperlipidemia  -     Lipid Panel    4. Coronary artery disease involving native coronary artery of native heart with angina pectoris (CMS/HCC)    5. Moderate episode of recurrent major depressive disorder (CMS/HCC)  -     TSH    6. Chronic kidney insufficiency, stage 3 (moderate) (CMS/HCC)  -     CBC & Differential    7. Encounter for prostate cancer screening  -     PSA SCREENING      Start amlodipine for better blood pressure control.    Get fasting labs in about 1 week.    Recheck here in 2 weeks to go over labs and make further decisions regarding treatment.  Will need to make sure his renal insufficiency has not worsened, and compare A1c to previous.  His triglycerides were extraordinarily high last time, so it is equally important to recheck that when fasting.  I explained to him that I would like to start a pattern of seeing him every 3 months to manage the multitude of problems that he has with an interest toward  preventing recurrent stroke, and heart attack.         Return in about 2 weeks (around 12/9/2019).    Call with any problems or concerns before next visit

## 2019-12-05 ENCOUNTER — TELEPHONE (OUTPATIENT)
Dept: FAMILY MEDICINE CLINIC | Facility: CLINIC | Age: 64
End: 2019-12-05

## 2019-12-05 NOTE — TELEPHONE ENCOUNTER
Called and s/w patient's son, Jarvis. Advised patient's son that his father has an appt on Tues and upon chart review that MD would like for patient to have fasting lab work prior to appt. Son voiced understanding and will have his father come into the office tomorrow to have fasting lab orders completed.

## 2019-12-26 DIAGNOSIS — F33.1 MODERATE EPISODE OF RECURRENT MAJOR DEPRESSIVE DISORDER (HCC): ICD-10-CM

## 2019-12-26 RX ORDER — ESCITALOPRAM OXALATE 10 MG/1
TABLET ORAL
Qty: 30 TABLET | Refills: 0 | Status: SHIPPED | OUTPATIENT
Start: 2019-12-26 | End: 2020-01-30

## 2019-12-26 RX ORDER — TAMSULOSIN HYDROCHLORIDE 0.4 MG/1
CAPSULE ORAL
Qty: 30 CAPSULE | Refills: 0 | Status: SHIPPED | OUTPATIENT
Start: 2019-12-26 | End: 2021-04-23

## 2019-12-30 RX ORDER — DOCUSATE SODIUM 100 MG/1
100 CAPSULE, LIQUID FILLED ORAL DAILY
Qty: 90 CAPSULE | Refills: 0 | Status: SHIPPED | OUTPATIENT
Start: 2019-12-30

## 2020-01-01 LAB
ALBUMIN SERPL-MCNC: 3.6 G/DL (ref 3.6–4.8)
ALBUMIN/GLOB SERPL: 1 {RATIO} (ref 1.2–2.2)
ALP SERPL-CCNC: 131 IU/L (ref 39–117)
ALT SERPL-CCNC: 10 IU/L (ref 0–44)
AST SERPL-CCNC: 11 IU/L (ref 0–40)
BASOPHILS # BLD AUTO: 0.1 X10E3/UL (ref 0–0.2)
BASOPHILS NFR BLD AUTO: 1 %
BILIRUB SERPL-MCNC: 0.6 MG/DL (ref 0–1.2)
BUN SERPL-MCNC: 21 MG/DL (ref 8–27)
BUN/CREAT SERPL: 14 (ref 10–24)
CALCIUM SERPL-MCNC: 8.7 MG/DL (ref 8.6–10.2)
CHLORIDE SERPL-SCNC: 104 MMOL/L (ref 96–106)
CHOLEST SERPL-MCNC: 278 MG/DL (ref 100–199)
CO2 SERPL-SCNC: 20 MMOL/L (ref 20–29)
CREAT SERPL-MCNC: 1.52 MG/DL (ref 0.76–1.27)
EOSINOPHIL # BLD AUTO: 0.6 X10E3/UL (ref 0–0.4)
EOSINOPHIL NFR BLD AUTO: 5 %
ERYTHROCYTE [DISTWIDTH] IN BLOOD BY AUTOMATED COUNT: 14.7 % (ref 12.3–15.4)
GLOBULIN SER CALC-MCNC: 3.5 G/DL (ref 1.5–4.5)
GLUCOSE SERPL-MCNC: 199 MG/DL (ref 65–99)
HBA1C MFR BLD: 7.1 % (ref 4.8–5.6)
HCT VFR BLD AUTO: 42.8 % (ref 37.5–51)
HDLC SERPL-MCNC: 27 MG/DL
HGB BLD-MCNC: 14.9 G/DL (ref 13–17.7)
IMM GRANULOCYTES # BLD AUTO: 0.1 X10E3/UL (ref 0–0.1)
IMM GRANULOCYTES NFR BLD AUTO: 1 %
LDLC SERPL CALC-MCNC: ABNORMAL MG/DL (ref 0–99)
LYMPHOCYTES # BLD AUTO: 2.4 X10E3/UL (ref 0.7–3.1)
LYMPHOCYTES NFR BLD AUTO: 24 %
MCH RBC QN AUTO: 28.5 PG (ref 26.6–33)
MCHC RBC AUTO-ENTMCNC: 34.8 G/DL (ref 31.5–35.7)
MCV RBC AUTO: 82 FL (ref 79–97)
MICROALBUMIN UR-MCNC: 6286.6 UG/ML
MONOCYTES # BLD AUTO: 0.7 X10E3/UL (ref 0.1–0.9)
MONOCYTES NFR BLD AUTO: 7 %
NEUTROPHILS # BLD AUTO: 6.3 X10E3/UL (ref 1.4–7)
NEUTROPHILS NFR BLD AUTO: 62 %
PLATELET # BLD AUTO: 213 X10E3/UL (ref 150–450)
POTASSIUM SERPL-SCNC: 4.5 MMOL/L (ref 3.5–5.2)
PROT SERPL-MCNC: 7.1 G/DL (ref 6–8.5)
PSA SERPL-MCNC: 7.4 NG/ML (ref 0–4)
RBC # BLD AUTO: 5.23 X10E6/UL (ref 4.14–5.8)
SODIUM SERPL-SCNC: 139 MMOL/L (ref 134–144)
TRIGL SERPL-MCNC: 503 MG/DL (ref 0–149)
TSH SERPL DL<=0.005 MIU/L-ACNC: 4.73 UIU/ML (ref 0.45–4.5)
VLDLC SERPL CALC-MCNC: ABNORMAL MG/DL (ref 5–40)
WBC # BLD AUTO: 10.2 X10E3/UL (ref 3.4–10.8)

## 2020-01-02 ENCOUNTER — TELEPHONE (OUTPATIENT)
Dept: FAMILY MEDICINE CLINIC | Facility: CLINIC | Age: 65
End: 2020-01-02

## 2020-01-02 DIAGNOSIS — R97.20 ELEVATED PSA: Primary | ICD-10-CM

## 2020-01-02 NOTE — TELEPHONE ENCOUNTER
----- Message from Marizol Puentes MD sent at 1/2/2020  1:45 PM EST -----  Please tell Isaias that his PSA has risen from 3.1 one year ago to 7.4 two days ago.  That is a little more than doubling in 1 year.  I plan to review all of his other labs with him at his upcoming visit, but I did not want to wait on this lab.  In general, all of his other labs are beginning to look better, in case he asks.  I would recommend he see a urologist about this.  Please enter a referral to [facility] for diagnosis of elevated screening PSA of 7.4.  Thanks

## 2020-01-02 NOTE — TELEPHONE ENCOUNTER
Called patient. Patient's identity verified. Advised him of lab results. He voiced understanding. Encouraged him to see Urology due to elevated PSA. He is in agreement, but would like to go to [place] office. Referral placed with preferred location of care noted.

## 2020-01-05 ENCOUNTER — HOSPITAL ENCOUNTER (EMERGENCY)
Facility: HOSPITAL | Age: 65
Discharge: HOME | End: 2020-01-05
Attending: EMERGENCY MEDICINE | Admitting: EMERGENCY MEDICINE

## 2020-01-05 VITALS
BODY MASS INDEX: 34.07 KG/M2 | HEIGHT: 69 IN | TEMPERATURE: 97.7 F | OXYGEN SATURATION: 98 % | DIASTOLIC BLOOD PRESSURE: 86 MMHG | WEIGHT: 230 LBS | HEART RATE: 78 BPM | RESPIRATION RATE: 17 BRPM | SYSTOLIC BLOOD PRESSURE: 163 MMHG

## 2020-01-05 DIAGNOSIS — R42 DIZZINESS: Primary | ICD-10-CM

## 2020-01-05 DIAGNOSIS — R55 NEAR SYNCOPE: ICD-10-CM

## 2020-01-05 LAB
ANION GAP SERPL CALCULATED.3IONS-SCNC: 11 MMOL/L (ref 5–15)
BASOPHILS # BLD AUTO: 0 10*3/MM3 (ref 0–0.2)
BASOPHILS NFR BLD AUTO: 0.4 % (ref 0–1.5)
BUN BLD-MCNC: 23 MG/DL (ref 8–23)
BUN/CREAT SERPL: 13.6 (ref 7–25)
CALCIUM SPEC-SCNC: 9.3 MG/DL (ref 8.6–10.5)
CHLORIDE SERPL-SCNC: 103 MMOL/L (ref 98–107)
CO2 SERPL-SCNC: 23 MMOL/L (ref 22–29)
CREAT BLD-MCNC: 1.69 MG/DL (ref 0.76–1.27)
DEPRECATED RDW RBC AUTO: 42.9 FL (ref 37–54)
EOSINOPHIL # BLD AUTO: 0.2 10*3/MM3 (ref 0–0.4)
EOSINOPHIL NFR BLD AUTO: 1.6 % (ref 0.3–6.2)
ERYTHROCYTE [DISTWIDTH] IN BLOOD BY AUTOMATED COUNT: 14.6 % (ref 12.3–15.4)
GFR SERPL CREATININE-BSD FRML MDRD: 41 ML/MIN/1.73
GLUCOSE BLD-MCNC: 268 MG/DL (ref 65–99)
HCT VFR BLD AUTO: 41.2 % (ref 37.5–51)
HGB BLD-MCNC: 14.3 G/DL (ref 13–17.7)
LYMPHOCYTES # BLD AUTO: 1 10*3/MM3 (ref 0.7–3.1)
LYMPHOCYTES NFR BLD AUTO: 8.5 % (ref 19.6–45.3)
MCH RBC QN AUTO: 28.8 PG (ref 26.6–33)
MCHC RBC AUTO-ENTMCNC: 34.7 G/DL (ref 31.5–35.7)
MCV RBC AUTO: 83 FL (ref 79–97)
MONOCYTES # BLD AUTO: 0.6 10*3/MM3 (ref 0.1–0.9)
MONOCYTES NFR BLD AUTO: 4.8 % (ref 5–12)
NEUTROPHILS # BLD AUTO: 10.1 10*3/MM3 (ref 1.7–7)
NEUTROPHILS NFR BLD AUTO: 84.7 % (ref 42.7–76)
NRBC BLD AUTO-RTO: 0.1 /100 WBC (ref 0–0.2)
PLATELET # BLD AUTO: 203 10*3/MM3 (ref 140–450)
PMV BLD AUTO: 9.8 FL (ref 6–12)
POTASSIUM BLD-SCNC: 4.8 MMOL/L (ref 3.5–5.2)
RBC # BLD AUTO: 4.97 10*6/MM3 (ref 4.14–5.8)
SODIUM BLD-SCNC: 137 MMOL/L (ref 136–145)
TROPONIN T SERPL-MCNC: 0.02 NG/ML (ref 0–0.03)
WBC NRBC COR # BLD: 12 10*3/MM3 (ref 3.4–10.8)

## 2020-01-05 PROCEDURE — 80048 BASIC METABOLIC PNL TOTAL CA: CPT | Performed by: EMERGENCY MEDICINE

## 2020-01-05 PROCEDURE — 36415 COLL VENOUS BLD VENIPUNCTURE: CPT

## 2020-01-05 PROCEDURE — 93005 ELECTROCARDIOGRAM TRACING: CPT | Performed by: EMERGENCY MEDICINE

## 2020-01-05 PROCEDURE — 84484 ASSAY OF TROPONIN QUANT: CPT | Performed by: EMERGENCY MEDICINE

## 2020-01-05 PROCEDURE — 85025 COMPLETE CBC W/AUTO DIFF WBC: CPT | Performed by: EMERGENCY MEDICINE

## 2020-01-05 PROCEDURE — 99284 EMERGENCY DEPT VISIT MOD MDM: CPT

## 2020-01-05 RX ORDER — SODIUM CHLORIDE 0.9 % (FLUSH) 0.9 %
10 SYRINGE (ML) INJECTION AS NEEDED
Status: DISCONTINUED | OUTPATIENT
Start: 2020-01-05 | End: 2020-01-05 | Stop reason: HOSPADM

## 2020-01-05 NOTE — ED PROVIDER NOTES
Subjective   Patient is a 64-year-old male complaining of dizziness and near syncope.  He states he felt dizzy for possibly 20 minutes.  He is at baseline at this time.  Denies injury from falling.  Denies headache cough fever chest pain or other complaint.          Review of Systems  Negative for headache loss of consciousness neck pain chest pain shortness of breath abdominal pain vomit diarrhea dysuria back pain extremity pain or other complaint  Past Medical History:   Diagnosis Date   • Cellulitis 10/15/2013   • Encounter for screening for malignant neoplasm of prostate 12/4/2017   • Encounter for screening for other disorder 12/4/2017   • Need for prophylactic vaccination against Streptococcus pneumoniae (pneumococcus) 10/19/2018   • Onychomycosis 10/19/2018   • Pain in right shoulder 10/3/2018       Allergies   Allergen Reactions   • Shellfish Allergy Nausea And Vomiting       Past Surgical History:   Procedure Laterality Date   • CATARACT EXTRACTION WITH INTRAOCULAR LENS IMPLANT Bilateral    • CORONARY ANGIOPLASTY WITH STENT PLACEMENT  2011    PTCA - LCx & RCA with stent   • ROTATOR CUFF REPAIR Right    • TOTAL SHOULDER REVERSE ARTHROPLASTY Right     after fracture 2018       Family History   Problem Relation Age of Onset   • Cerebral aneurysm Father    • Peripheral vascular disease Brother    • Diabetes type II Brother        Social History     Socioeconomic History   • Marital status:      Spouse name: Not on file   • Number of children: Not on file   • Years of education: Not on file   • Highest education level: Not on file   Tobacco Use   • Smoking status: Never Smoker   • Smokeless tobacco: Never Used           Objective   Physical Exam  Neurologic exam is nonfocal.  HEENT exam is no point test.  Neck is nontender.  Lungs are clear.  Chest is nontender.  Abdomen is soft nontender.  Extremity exam is no cyanosis or edema.  Procedures       My EKG interpretation shows normal sinus rhythm with no  acute ST change    ED Course      Results for orders placed or performed during the hospital encounter of 01/05/20   Basic Metabolic Panel   Result Value Ref Range    Glucose 268 (H) 65 - 99 mg/dL    BUN 23 8 - 23 mg/dL    Creatinine 1.69 (H) 0.76 - 1.27 mg/dL    Sodium 137 136 - 145 mmol/L    Potassium 4.8 3.5 - 5.2 mmol/L    Chloride 103 98 - 107 mmol/L    CO2 23.0 22.0 - 29.0 mmol/L    Calcium 9.3 8.6 - 10.5 mg/dL    eGFR Non African Amer 41 (L) >60 mL/min/1.73    BUN/Creatinine Ratio 13.6 7.0 - 25.0    Anion Gap 11.0 5.0 - 15.0 mmol/L   Troponin   Result Value Ref Range    Troponin T 0.020 0.000 - 0.030 ng/mL   CBC Auto Differential   Result Value Ref Range    WBC 12.00 (H) 3.40 - 10.80 10*3/mm3    RBC 4.97 4.14 - 5.80 10*6/mm3    Hemoglobin 14.3 13.0 - 17.7 g/dL    Hematocrit 41.2 37.5 - 51.0 %    MCV 83.0 79.0 - 97.0 fL    MCH 28.8 26.6 - 33.0 pg    MCHC 34.7 31.5 - 35.7 g/dL    RDW 14.6 12.3 - 15.4 %    RDW-SD 42.9 37.0 - 54.0 fl    MPV 9.8 6.0 - 12.0 fL    Platelets 203 140 - 450 10*3/mm3    Neutrophil % 84.7 (H) 42.7 - 76.0 %    Lymphocyte % 8.5 (L) 19.6 - 45.3 %    Monocyte % 4.8 (L) 5.0 - 12.0 %    Eosinophil % 1.6 0.3 - 6.2 %    Basophil % 0.4 0.0 - 1.5 %    Neutrophils, Absolute 10.10 (H) 1.70 - 7.00 10*3/mm3    Lymphocytes, Absolute 1.00 0.70 - 3.10 10*3/mm3    Monocytes, Absolute 0.60 0.10 - 0.90 10*3/mm3    Eosinophils, Absolute 0.20 0.00 - 0.40 10*3/mm3    Basophils, Absolute 0.00 0.00 - 0.20 10*3/mm3    nRBC 0.1 0.0 - 0.2 /100 WBC     No radiology results for the last day                                             MDM  Number of Diagnoses or Management Options  Diagnosis management comments: She has benign physical exam.  Is no evidence of acute condition.  There is no evidence of infectious process or metabolic abnormality.  Patient had no ectopy and was in normal sinus rhythm throughout his ED visit.  He remained at baseline throughout the ED visit.  Will be discharged to follow with his MD for  recheck this week.    Risk of Complications, Morbidity, and/or Mortality  Presenting problems: high  Diagnostic procedures: high  Management options: high    Patient Progress  Patient progress: stable      Final diagnoses:   Dizziness   Near syncope            Branden Campbell MD  01/05/20 0203

## 2020-01-05 NOTE — ED NOTES
Reports stent placement x 9 years ago, [facility] cardiologist     Hayley Nur, RN  01/05/20 [id number]

## 2020-01-05 NOTE — ED NOTES
Dizziness this morning reports a fall denies LOC, denies hitting head.     Hayley Nur RN  01/05/20 0205

## 2020-01-20 ENCOUNTER — OFFICE VISIT (OUTPATIENT)
Dept: FAMILY MEDICINE CLINIC | Facility: CLINIC | Age: 65
End: 2020-01-20

## 2020-01-20 VITALS
OXYGEN SATURATION: 97 % | TEMPERATURE: 98.4 F | HEIGHT: 69 IN | DIASTOLIC BLOOD PRESSURE: 90 MMHG | BODY MASS INDEX: 35.49 KG/M2 | WEIGHT: 239.6 LBS | HEART RATE: 85 BPM | RESPIRATION RATE: 18 BRPM | SYSTOLIC BLOOD PRESSURE: 158 MMHG

## 2020-01-20 DIAGNOSIS — I63.439 CEREBROVASCULAR ACCIDENT (CVA) DUE TO EMBOLISM OF POSTERIOR CEREBRAL ARTERY, UNSPECIFIED BLOOD VESSEL LATERALITY (HCC): ICD-10-CM

## 2020-01-20 DIAGNOSIS — E78.2 MIXED HYPERLIPIDEMIA: ICD-10-CM

## 2020-01-20 DIAGNOSIS — R97.20 ELEVATED PSA: ICD-10-CM

## 2020-01-20 DIAGNOSIS — E11.65 TYPE 2 DIABETES MELLITUS WITH HYPERGLYCEMIA, WITH LONG-TERM CURRENT USE OF INSULIN (HCC): ICD-10-CM

## 2020-01-20 DIAGNOSIS — E03.9 ACQUIRED HYPOTHYROIDISM: ICD-10-CM

## 2020-01-20 DIAGNOSIS — I10 BENIGN ESSENTIAL HYPERTENSION: Primary | ICD-10-CM

## 2020-01-20 DIAGNOSIS — Z79.4 TYPE 2 DIABETES MELLITUS WITH HYPERGLYCEMIA, WITH LONG-TERM CURRENT USE OF INSULIN (HCC): ICD-10-CM

## 2020-01-20 DIAGNOSIS — N18.30 CHRONIC KIDNEY INSUFFICIENCY, STAGE 3 (MODERATE) (HCC): ICD-10-CM

## 2020-01-20 PROCEDURE — 99214 OFFICE O/P EST MOD 30 MIN: CPT | Performed by: FAMILY MEDICINE

## 2020-01-20 RX ORDER — LEVOTHYROXINE SODIUM 0.03 MG/1
25 TABLET ORAL DAILY
Qty: 30 TABLET | Refills: 5 | Status: SHIPPED | OUTPATIENT
Start: 2020-01-20

## 2020-01-20 RX ORDER — PRAVASTATIN SODIUM 40 MG
40 TABLET ORAL DAILY
Qty: 30 TABLET | Refills: 5 | Status: SHIPPED | OUTPATIENT
Start: 2020-01-20

## 2020-01-20 NOTE — PROGRESS NOTES
Subjective   Isaias Molina is a 64 y.o. male.   Chief Complaint   Patient presents with   • Hypertension     Been out of Benazepril for 2-3 days--will need refill. Rarely checks BP at home. low 100's/70-80   • Diabetes     Blood sugars are running around 200-250. Needs refills on insulin,lancets, and test strips.        History of Present Illness   Presents today for follow-up on chronic problems.  I last saw him back in November.   I had only seen him once before in July.  I ordered labs on him and asked him to come back in about 2 weeks.  He canceled 2 appointments due to transportation issues.  He finally got the labs done on December 31.  PSA was high at 7.4.  We notified him by phone of this almost 3 weeks ago and I made a referral to urology.  Microalbumin was over 6000.  TSH was high at 4.7.  Cholesterol is completely uncontrolled with a total cholesterol of 278.  Triglycerides were 503 and LDL was incalculable.  A1c was 7.1%.  BUN and creatinine were 21 and 1.52.  CBC was normal.      Patient Active Problem List    Diagnosis Date Noted   • Elevated PSA 01/20/2020   • Acquired hypothyroidism 01/20/2020   • Chronic kidney insufficiency, stage 3 (moderate) (CMS/[facility]) 11/25/2019     Note Last Updated: 11/25/2019     ? Due to diabetic nephropathy     • Coronary artery disease 07/08/2019     Note Last Updated: 7/8/2019     On cath with stent x 2 in 2011     • Mixed hyperlipidemia 07/08/2019   • Obesity 07/08/2019   • Type 2 diabetes mellitus (CMS/[facility]) 07/08/2019   • Episode of recurrent major depressive disorder (CMS/[facility]) 07/08/2019   • Benign essential hypertension 02/01/2018   • Vitamin B12 deficiency 02/01/2018   • Cerebrovascular accident (CVA) (CMS/[facility]) 01/03/2018     Note Last Updated: 7/8/2019     Occipital.  Ischemic.  2017 - residual vision impairment     • Abnormal gait 12/04/2017   • Homonymous hemianopia 12/04/2017   • Resting tremor 12/04/2017   • Tremor, essential 12/04/2017           Past  "Surgical History:   Procedure Laterality Date   • CATARACT EXTRACTION WITH INTRAOCULAR LENS IMPLANT Bilateral    • CORONARY ANGIOPLASTY WITH STENT PLACEMENT  2011    PTCA - LCx & RCA with stent   • ROTATOR CUFF REPAIR Right    • TOTAL SHOULDER REVERSE ARTHROPLASTY Right     after fracture 2018     Current Outpatient Medications on File Prior to Visit   Medication Sig   • amLODIPine (NORVASC) 5 MG tablet Take 1 tablet by mouth Daily.   • aspirin 81 MG EC tablet Take 81 mg by mouth Daily.   • benazepril (LOTENSIN) 40 MG tablet TAKE ONE TABLET BY MOUTH EVERY DAY   • clopidogrel (PLAVIX) 75 MG tablet Daily.   • Cyanocobalamin (B-12 COMPLIANCE INJECTION) 1000 MCG/ML kit B-12 COMPLIANCE INJECTION 1000 MCG/ML KIT   • docusate sodium (COLACE) 100 MG capsule Take 1 capsule by mouth Daily.   • escitalopram (LEXAPRO) 10 MG tablet TAKE ONE TABLET BY MOUTH EVERY DAY   • hydrochlorothiazide (HYDRODIURIL) 25 MG tablet TAKE ONE TABLET BY MOUTH EVERY DAY   • Insulin Syringe-Needle U-100 (ULTICARE INSULIN SYRINGE) 31G X 5/16\" 0.5 ML misc ULTICARE INSULIN SYRINGE 31G X 5/16\" 0.5 ML   • ketoconazole (NIZORAL) 2 % cream KETOCONAZOLE 2 % CREA   • metFORMIN (GLUCOPHAGE) 500 MG tablet TAKE ONE TABLET BY MOUTH TWICE DAILY with meals   • NOVOLIN 70/30 (70-30) 100 UNIT/ML injection inject 50 units SUBCUTANEOUSLY EVERY MORNING AND 40 units SUBCUTANEOUSLY EVERY EVENING   • tamsulosin (FLOMAX) 0.4 MG capsule 24 hr capsule TAKE ONE CAPSULE BY MOUTH EVERY DAY   • [DISCONTINUED] glucose blood (ACCU-CHEK ROLANDO) test strip ACCU-CHEK ROLANDO IN VITRO STRIP   • [DISCONTINUED] pravastatin (PRAVACHOL) 20 MG tablet TAKE ONE TABLET BY MOUTH AT BEDTIME     No current facility-administered medications on file prior to visit.      Allergies   Allergen Reactions   • Shellfish Allergy Nausea And Vomiting     Social History     Socioeconomic History   • Marital status:      Spouse name: Not on file   • Number of children: Not on file   • Years of education: " "Not on file   • Highest education level: Not on file   Tobacco Use   • Smoking status: Never Smoker   • Smokeless tobacco: Never Used   Substance and Sexual Activity   • Alcohol use: Never     Frequency: Never   • Drug use: Never   • Sexual activity: Defer     Family History   Problem Relation Age of Onset   • Cerebral aneurysm Father    • Peripheral vascular disease Brother    • Diabetes type II Brother    • Nephrotic syndrome Grandson    • Nephrotic syndrome Granddaughter      The following portions of the patient's history were reviewed and updated as appropriate: allergies, current medications, past family history, past medical history, past social history, past surgical history and problem list.    Review of Systems   Constitutional: Negative for chills and fever.   Respiratory: Negative for shortness of breath.    Cardiovascular: Negative for chest pain.   Gastrointestinal: Negative for abdominal pain.       Objective   /90 (BP Location: Left arm, Patient Position: Sitting, Cuff Size: Large Adult)   Pulse 85   Temp 98.4 °F (36.9 °C) (Oral)   Resp 18   Ht 175.3 cm (69\")   Wt 109 kg (239 lb 9.6 oz)   SpO2 97%   BMI 35.38 kg/m²   Physical Exam   Constitutional: He is oriented to person, place, and time. He appears well-developed and well-nourished.   Slightly disheveled.   HENT:   Head: Normocephalic and atraumatic.   Eyes: Conjunctivae and EOM are normal.   Neck: Normal range of motion.   Cardiovascular: Normal rate, regular rhythm and normal heart sounds.   No murmur heard.  Pulmonary/Chest: Effort normal and breath sounds normal. No respiratory distress.   Musculoskeletal: Normal range of motion.   Slow, shuffling gait.   Neurological: He is alert and oriented to person, place, and time.   Skin: Skin is warm and dry. No rash noted.   Psychiatric: He has a normal mood and affect. His behavior is normal. His speech is delayed.         Admission on 01/05/2020, Discharged on 01/05/2020   Component " Date Value Ref Range Status   • Glucose 01/05/2020 268* 65 - 99 mg/dL Final   • BUN 01/05/2020 23  8 - 23 mg/dL Final   • Creatinine 01/05/2020 1.69* 0.76 - 1.27 mg/dL Final   • Sodium 01/05/2020 137  136 - 145 mmol/L Final   • Potassium 01/05/2020 4.8  3.5 - 5.2 mmol/L Final   • Chloride 01/05/2020 103  98 - 107 mmol/L Final   • CO2 01/05/2020 23.0  22.0 - 29.0 mmol/L Final   • Calcium 01/05/2020 9.3  8.6 - 10.5 mg/dL Final   • eGFR Non African Amer 01/05/2020 41* >60 mL/min/1.73 Final   • BUN/Creatinine Ratio 01/05/2020 13.6  7.0 - 25.0 Final   • Anion Gap 01/05/2020 11.0  5.0 - 15.0 mmol/L Final   • Troponin T 01/05/2020 0.020  0.000 - 0.030 ng/mL Final   • WBC 01/05/2020 12.00* 3.40 - 10.80 10*3/mm3 Final   • RBC 01/05/2020 4.97  4.14 - 5.80 10*6/mm3 Final   • Hemoglobin 01/05/2020 14.3  13.0 - 17.7 g/dL Final   • Hematocrit 01/05/2020 41.2  37.5 - 51.0 % Final   • MCV 01/05/2020 83.0  79.0 - 97.0 fL Final   • MCH 01/05/2020 28.8  26.6 - 33.0 pg Final   • MCHC 01/05/2020 34.7  31.5 - 35.7 g/dL Final   • RDW 01/05/2020 14.6  12.3 - 15.4 % Final   • RDW-SD 01/05/2020 42.9  37.0 - 54.0 fl Final   • MPV 01/05/2020 9.8  6.0 - 12.0 fL Final   • Platelets 01/05/2020 203  140 - 450 10*3/mm3 Final   • Neutrophil % 01/05/2020 84.7* 42.7 - 76.0 % Final   • Lymphocyte % 01/05/2020 8.5* 19.6 - 45.3 % Final   • Monocyte % 01/05/2020 4.8* 5.0 - 12.0 % Final   • Eosinophil % 01/05/2020 1.6  0.3 - 6.2 % Final   • Basophil % 01/05/2020 0.4  0.0 - 1.5 % Final   • Neutrophils, Absolute 01/05/2020 10.10* 1.70 - 7.00 10*3/mm3 Final   • Lymphocytes, Absolute 01/05/2020 1.00  0.70 - 3.10 10*3/mm3 Final   • Monocytes, Absolute 01/05/2020 0.60  0.10 - 0.90 10*3/mm3 Final   • Eosinophils, Absolute 01/05/2020 0.20  0.00 - 0.40 10*3/mm3 Final   • Basophils, Absolute 01/05/2020 0.00  0.00 - 0.20 10*3/mm3 Final   • nRBC 01/05/2020 0.1  0.0 - 0.2 /100 WBC Final   Office Visit on 11/25/2019   Component Date Value Ref Range Status   • Glucose  12/31/2019 199* 65 - 99 mg/dL Final   • BUN 12/31/2019 21  8 - 27 mg/dL Final   • Creatinine 12/31/2019 1.52* 0.76 - 1.27 mg/dL Final   • eGFR Non African Am 12/31/2019 48* >59 mL/min/1.73 Final   • eGFR African Am 12/31/2019 55* >59 mL/min/1.73 Final   • BUN/Creatinine Ratio 12/31/2019 14  10 - 24 Final   • Sodium 12/31/2019 139  134 - 144 mmol/L Final   • Potassium 12/31/2019 4.5  3.5 - 5.2 mmol/L Final   • Chloride 12/31/2019 104  96 - 106 mmol/L Final   • Total CO2 12/31/2019 20  20 - 29 mmol/L Final   • Calcium 12/31/2019 8.7  8.6 - 10.2 mg/dL Final   • Total Protein 12/31/2019 7.1  6.0 - 8.5 g/dL Final   • Albumin 12/31/2019 3.6  3.6 - 4.8 g/dL Final   • Globulin 12/31/2019 3.5  1.5 - 4.5 g/dL Final   • A/G Ratio 12/31/2019 1.0* 1.2 - 2.2 Final   • Total Bilirubin 12/31/2019 0.6  0.0 - 1.2 mg/dL Final   • Alkaline Phosphatase 12/31/2019 131* 39 - 117 IU/L Final   • AST (SGOT) 12/31/2019 11  0 - 40 IU/L Final   • ALT (SGPT) 12/31/2019 10  0 - 44 IU/L Final   • Hemoglobin A1C 12/31/2019 7.1* 4.8 - 5.6 % Final    Comment:          Prediabetes: 5.7 - 6.4           Diabetes: >6.4           Glycemic control for adults with diabetes: <7.0     • Total Cholesterol 12/31/2019 278* 100 - 199 mg/dL Final   • Triglycerides 12/31/2019 503* 0 - 149 mg/dL Final   • HDL Cholesterol 12/31/2019 27* >39 mg/dL Final   • VLDL Cholesterol 12/31/2019 Comment  5 - 40 mg/dL Final    Comment: The calculation for the VLDL cholesterol is not valid when  triglyceride level is >400 mg/dL.     • LDL Cholesterol  12/31/2019 Comment  0 - 99 mg/dL Final    Comment: Triglyceride result indicated is too high for an accurate LDL  cholesterol estimation.     • TSH 12/31/2019 4.730* 0.450 - 4.500 uIU/mL Final   • Microalbumin, Urine 12/31/2019 6,286.6  Not Estab. ug/mL Final    Comment: Results confirmed on  dilution.     • WBC 12/31/2019 10.2  3.4 - 10.8 x10E3/uL Final   • RBC 12/31/2019 5.23  4.14 - 5.80 x10E6/uL Final   • Hemoglobin 12/31/2019  14.9  13.0 - 17.7 g/dL Final   • Hematocrit 12/31/2019 42.8  37.5 - 51.0 % Final   • MCV 12/31/2019 82  79 - 97 fL Final   • MCH 12/31/2019 28.5  26.6 - 33.0 pg Final   • MCHC 12/31/2019 34.8  31.5 - 35.7 g/dL Final   • RDW 12/31/2019 14.7  12.3 - 15.4 % Final    Comment: **Effective January 6, 2020, the RDW pediatric reference**    interval will be removed and the adult reference interval    will be changing to:                             Female 11.7 - 15.4                                                       Male 11.6 - 15.4     • Platelets 12/31/2019 213  150 - 450 x10E3/uL Final   • Neutrophil Rel % 12/31/2019 62  Not Estab. % Final   • Lymphocyte Rel % 12/31/2019 24  Not Estab. % Final   • Monocyte Rel % 12/31/2019 7  Not Estab. % Final   • Eosinophil Rel % 12/31/2019 5  Not Estab. % Final   • Basophil Rel % 12/31/2019 1  Not Estab. % Final   • Neutrophils Absolute 12/31/2019 6.3  1.4 - 7.0 x10E3/uL Final   • Lymphocytes Absolute 12/31/2019 2.4  0.7 - 3.1 x10E3/uL Final   • Monocytes Absolute 12/31/2019 0.7  0.1 - 0.9 x10E3/uL Final   • Eosinophils Absolute 12/31/2019 0.6* 0.0 - 0.4 x10E3/uL Final   • Basophils Absolute 12/31/2019 0.1  0.0 - 0.2 x10E3/uL Final   • Immature Granulocyte Rel % 12/31/2019 1  Not Estab. % Final   • Immature Grans Absolute 12/31/2019 0.1  0.0 - 0.1 x10E3/uL Final   • PSA 12/31/2019 7.4* 0.0 - 4.0 ng/mL Final    Comment: Roche ECLIA methodology.  According to the American Urological Association, Serum PSA should  decrease and remain at undetectable levels after radical  prostatectomy. The AUA defines biochemical recurrence as an initial  PSA value 0.2 ng/mL or greater followed by a subsequent confirmatory  PSA value 0.2 ng/mL or greater.  Values obtained with different assay methods or kits cannot be used  interchangeably. Results cannot be interpreted as absolute evidence  of the presence or absence of malignant disease.      microalbuminuria.      Assessment/Plan   Diagnoses and  all orders for this visit:    1. Benign essential hypertension (Primary)    2. Cerebrovascular accident (CVA) due to embolism of posterior cerebral artery, unspecified blood vessel laterality (CMS/HCC)    3. Mixed hyperlipidemia  -     pravastatin (PRAVACHOL) 40 MG tablet; Take 1 tablet by mouth Daily.  Dispense: 30 tablet; Refill: 5    4. Type 2 diabetes mellitus with hyperglycemia, with long-term current use of insulin (CMS/[facility])  -     Lancets (ACCU-CHEK SOFT TOUCH) lancets; Use with device and strips to check blood glucose  Dispense: 100 each; Refill: 3  -     glucose blood (ACCU-CHEK ROLANDO) test strip; 1 each by Other route Daily. Use as instructed  Dispense: 100 each; Refill: 3  -     Hemoglobin A1c; Future    5. Chronic kidney insufficiency, stage 3 (moderate) (CMS/[facility])    6. Elevated PSA    7. Acquired hypothyroidism  -     levothyroxine (SYNTHROID) 25 MCG tablet; Take 1 tablet by mouth Daily.  Dispense: 30 tablet; Refill: 5    Lots going on.  This is in the setting of difficult transportation making consistent appointments and follow-up problematic.  Increase pravastatin to 40mg per day to try to improve cholesterol control.  Start synthroid at 25 mcg/day.  We will need to recheck a TSH in about 6 weeks.  RF insulin, benazepril -needs to continue ACE inhibitor due to microalbuminuria  Blood pressure is elevated today, but he says that is because he has been out of his benazepril.  Recheck chol in 6 months,   a1c in 3 months, we will see if he can do that a few days before the next visit.  We contacted urology directly today to facilitate his appointment due to the elevated PSA.         Return in about 3 months (around 4/20/2020).    Call with any problems or concerns before next visit

## 2020-01-30 DIAGNOSIS — F33.1 MODERATE EPISODE OF RECURRENT MAJOR DEPRESSIVE DISORDER (HCC): ICD-10-CM

## 2020-01-30 RX ORDER — ESCITALOPRAM OXALATE 10 MG/1
TABLET ORAL
Qty: 30 TABLET | Refills: 5 | Status: SHIPPED | OUTPATIENT
Start: 2020-01-30 | End: 2020-09-09

## 2020-02-03 RX ORDER — CLOPIDOGREL BISULFATE 75 MG/1
75 TABLET ORAL DAILY
Qty: 90 TABLET | Refills: 3 | Status: SHIPPED | OUTPATIENT
Start: 2020-02-03

## 2020-02-03 NOTE — TELEPHONE ENCOUNTER
Received refill request for patient's Plavix. Med is loaded and ready to send.    Last OV: 1-  Next OV: None    Last Labs: 1-5-2020

## 2020-04-19 ENCOUNTER — RESULTS ENCOUNTER (OUTPATIENT)
Dept: FAMILY MEDICINE CLINIC | Facility: CLINIC | Age: 65
End: 2020-04-19

## 2020-04-19 DIAGNOSIS — Z79.4 TYPE 2 DIABETES MELLITUS WITH HYPERGLYCEMIA, WITH LONG-TERM CURRENT USE OF INSULIN (HCC): ICD-10-CM

## 2020-04-19 DIAGNOSIS — E11.65 TYPE 2 DIABETES MELLITUS WITH HYPERGLYCEMIA, WITH LONG-TERM CURRENT USE OF INSULIN (HCC): ICD-10-CM

## 2020-04-29 ENCOUNTER — OFFICE VISIT (OUTPATIENT)
Dept: FAMILY MEDICINE CLINIC | Facility: CLINIC | Age: 65
End: 2020-04-29

## 2020-04-29 VITALS
OXYGEN SATURATION: 96 % | BODY MASS INDEX: 36.14 KG/M2 | HEART RATE: 81 BPM | SYSTOLIC BLOOD PRESSURE: 182 MMHG | TEMPERATURE: 97.2 F | WEIGHT: 244 LBS | DIASTOLIC BLOOD PRESSURE: 91 MMHG | HEIGHT: 69 IN

## 2020-04-29 DIAGNOSIS — R56.9 SEIZURE AS LATE EFFECT OF CEREBROVASCULAR ACCIDENT (CVA) (HCC): Primary | ICD-10-CM

## 2020-04-29 DIAGNOSIS — I69.398 SEIZURE AS LATE EFFECT OF CEREBROVASCULAR ACCIDENT (CVA) (HCC): Primary | ICD-10-CM

## 2020-04-29 DIAGNOSIS — I10 BENIGN ESSENTIAL HYPERTENSION: ICD-10-CM

## 2020-04-29 PROCEDURE — 99214 OFFICE O/P EST MOD 30 MIN: CPT | Performed by: FAMILY MEDICINE

## 2020-04-29 RX ORDER — LEVETIRACETAM 500 MG/1
500 TABLET ORAL 2 TIMES DAILY
Qty: 60 TABLET | Refills: 5 | Status: SHIPPED | OUTPATIENT
Start: 2020-04-29

## 2020-04-29 RX ORDER — BENAZEPRIL HYDROCHLORIDE 40 MG/1
40 TABLET, FILM COATED ORAL DAILY
Qty: 90 TABLET | Refills: 3 | Status: SHIPPED | OUTPATIENT
Start: 2020-04-29 | End: 2021-06-13 | Stop reason: HOSPADM

## 2020-04-29 RX ORDER — AMLODIPINE BESYLATE 5 MG/1
5 TABLET ORAL DAILY
Qty: 90 TABLET | Refills: 3 | Status: SHIPPED | OUTPATIENT
Start: 2020-04-29 | End: 2021-06-13 | Stop reason: HOSPADM

## 2020-04-29 NOTE — PROGRESS NOTES
Subjective   Isaias Molina is a 64 y.o. male.   Chief Complaint   Patient presents with   • Seizures       History of Present Illness     Presents to the office today to follow-up on an ER visit.  He went to [facility] emergency room at around 5:00 on Monday evening.  According to the record he was there just over an hour.  History indicates he had a witnessed seizure lasting 1 to 2 minutes about a half an hour prior to arrival.  This was witnessed by his son.  Generalized tonic-clonic activity was seen.  Postictal phase lasted about 25 minutes he has known seizure disorder.  Apparently by the time he was there he was feeling better.  They did a lab work-up which did not show any major electrolyte abnormalities.  Glucose was 267.  He describes complete amnesia of the seizure.  He does not know what triggered it.  He felt fine beforehand.  He was with his son.  He states he bit his tongue during the seizure and his muscles are sore as if he has been lifting weights.    He had another seizure in mid March and went to the [facility] ER then as well.  He had a CT of his brain which showed his old stroke but no new masses, lesions, bleeding or strokes.  ER record indicates they prescribed Keppra but he tells me he has never gotten it from the pharmacy.    Additional issue is hypertension- he has been out of his benazepril and he needs a refill on that.  It also looks like he is out or should be out of his amlodipine very soon as well.    Patient Active Problem List    Diagnosis Date Noted   • Seizure as late effect of cerebrovascular accident (CVA) (CMS/[facility]) 04/29/2020   • Elevated PSA 01/20/2020   • Acquired hypothyroidism 01/20/2020   • Chronic kidney insufficiency, stage 3 (moderate) (CMS/[facility]) 11/25/2019     Note Last Updated: 11/25/2019     ? Due to diabetic nephropathy     • Coronary artery disease 07/08/2019     Note Last Updated: 7/8/2019     On cath with stent x 2 in 2011     • Mixed hyperlipidemia  "07/08/2019   • Obesity 07/08/2019   • Type 2 diabetes mellitus (CMS/[facility]) 07/08/2019   • Episode of recurrent major depressive disorder (CMS/[facility]) 07/08/2019   • Benign essential hypertension 02/01/2018   • Vitamin B12 deficiency 02/01/2018   • Cerebrovascular accident (CVA) (CMS/[facility]) 01/03/2018     Note Last Updated: 7/8/2019     Occipital.  Ischemic.  2017 - residual vision impairment     • Abnormal gait 12/04/2017   • Homonymous hemianopia 12/04/2017   • Resting tremor 12/04/2017   • Tremor, essential 12/04/2017           Past Surgical History:   Procedure Laterality Date   • CATARACT EXTRACTION WITH INTRAOCULAR LENS IMPLANT Bilateral    • CORONARY ANGIOPLASTY WITH STENT PLACEMENT  2011    PTCA - LCx & RCA with stent   • ROTATOR CUFF REPAIR Right    • TOTAL SHOULDER REVERSE ARTHROPLASTY Right     after fracture 2018     Current Outpatient Medications on File Prior to Visit   Medication Sig   • aspirin 81 MG EC tablet Take 81 mg by mouth Daily.   • clopidogrel (PLAVIX) 75 MG tablet Take 1 tablet by mouth Daily.   • Cyanocobalamin (B-12 COMPLIANCE INJECTION) 1000 MCG/ML kit B-12 COMPLIANCE INJECTION 1000 MCG/ML KIT   • docusate sodium (COLACE) 100 MG capsule Take 1 capsule by mouth Daily.   • escitalopram (LEXAPRO) 10 MG tablet TAKE ONE TABLET BY MOUTH EVERY DAY   • glucose blood (ACCU-CHEK ROLANDO) test strip 1 each by Other route Daily. Use as instructed   • hydrochlorothiazide (HYDRODIURIL) 25 MG tablet TAKE ONE TABLET BY MOUTH EVERY DAY   • Insulin Syringe-Needle U-100 (ULTICARE INSULIN SYRINGE) 31G X 5/16\" 0.5 ML misc ULTICARE INSULIN SYRINGE 31G X 5/16\" 0.5 ML   • ketoconazole (NIZORAL) 2 % cream KETOCONAZOLE 2 % CREA   • Lancets (ACCU-CHEK SOFT TOUCH) lancets Use with device and strips to check blood glucose   • levothyroxine (SYNTHROID) 25 MCG tablet Take 1 tablet by mouth Daily.   • metFORMIN (GLUCOPHAGE) 500 MG tablet TAKE ONE TABLET BY MOUTH TWICE DAILY with meals   • NOVOLIN 70/30 (70-30) 100 UNIT/ML " injection inject 50 units SUBCUTANEOUSLY EVERY MORNING AND 40 units SUBCUTANEOUSLY EVERY EVENING   • pravastatin (PRAVACHOL) 40 MG tablet Take 1 tablet by mouth Daily.   • tamsulosin (FLOMAX) 0.4 MG capsule 24 hr capsule TAKE ONE CAPSULE BY MOUTH EVERY DAY   • [DISCONTINUED] amLODIPine (NORVASC) 5 MG tablet Take 1 tablet by mouth Daily.   • [DISCONTINUED] benazepril (LOTENSIN) 40 MG tablet TAKE ONE TABLET BY MOUTH EVERY DAY     No current facility-administered medications on file prior to visit.      Allergies   Allergen Reactions   • Shellfish Allergy Nausea And Vomiting     Social History     Socioeconomic History   • Marital status:      Spouse name: Not on file   • Number of children: Not on file   • Years of education: Not on file   • Highest education level: Not on file   Tobacco Use   • Smoking status: Never Smoker   • Smokeless tobacco: Never Used   Substance and Sexual Activity   • Alcohol use: Never     Frequency: Never   • Drug use: Never   • Sexual activity: Defer     Family History   Problem Relation Age of Onset   • Cerebral aneurysm Father    • Peripheral vascular disease Brother    • Diabetes type II Brother    • Nephrotic syndrome Grandson    • Nephrotic syndrome Granddaughter      The following portions of the patient's history were reviewed and updated as appropriate: allergies, current medications, past family history, past medical history, past social history, past surgical history and problem list.    Review of Systems   Constitutional: Positive for fatigue. Negative for chills and fever.   Respiratory: Negative for cough, chest tightness and shortness of breath.    Cardiovascular: Negative for chest pain, palpitations and leg swelling.   Skin: Negative for rash.   Neurological: Negative for dizziness, light-headedness, numbness and headache.       Objective   BP (!) 182/91 (BP Location: Right arm, Patient Position: Sitting, Cuff Size: Adult)   Pulse 81   Temp 97.2 °F (36.2 °C) (Oral)   " Ht 175.3 cm (69.02\")   Wt 111 kg (244 lb)   SpO2 96%   BMI 36.02 kg/m²   Physical Exam   Constitutional: He is oriented to person, place, and time. He appears well-developed and well-nourished.   Slightly disheveled.   HENT:   Head: Normocephalic and atraumatic.   Eyes: Conjunctivae and EOM are normal.   Neck: Normal range of motion.   Cardiovascular: Normal rate, regular rhythm and normal heart sounds.   No murmur heard.  Pulmonary/Chest: Effort normal and breath sounds normal. No respiratory distress.   Musculoskeletal: Normal range of motion.   Slow, shuffling gait.   Neurological: He is alert and oriented to person, place, and time.   Skin: Skin is warm and dry. No rash noted.   Psychiatric: He has a normal mood and affect. His behavior is normal. His speech is delayed.       Assessment/Plan   Diagnoses and all orders for this visit:    1. Seizure as late effect of cerebrovascular accident (CVA) (CMS/[facility]) (Primary)  -     MRI Brain With & Without Contrast; Future  -     EEG  -     Ambulatory Referral to Neurology  -     levETIRAcetam (Keppra) 500 MG tablet; Take 1 tablet by mouth 2 (Two) Times a Day.  Dispense: 60 tablet; Refill: 5    2. Benign essential hypertension  -     amLODIPine (NORVASC) 5 MG tablet; Take 1 tablet by mouth Daily.  Dispense: 90 tablet; Refill: 3  -     benazepril (LOTENSIN) 40 MG tablet; Take 1 tablet by mouth Daily.  Dispense: 90 tablet; Refill: 3    Will treat this as new onset seizures following a stroke 3 years ago.  He has had no electrolyte abnormalities.  He has not been started on any new medications.  The only real medicine change has been a lack of the benazepril for several months which is probably led to elevated blood pressures.  He denies substance abuse and he denies having had prior seizures.  He did not have a new stroke on his CT 6 weeks ago.  I will start him on Keppra 500 mg twice daily today.  We will order an EEG and an MRI of his brain and get a referral to " [facility] for further evaluation.  Do not drive.  Do not take tub baths when alone.  Avoid heights and ladders.  We will restart his Lotensin and refill the amlodipine as well.  I would like to check him back in 3 weeks to make sure his blood pressure is good, make sure he has had no further seizures and is having no problems with the Keppra and hopefully will have some test results as well.       Return in about 3 weeks (around 5/20/2020).    Call with any problems or concerns before next visit

## 2020-05-27 ENCOUNTER — OFFICE VISIT (OUTPATIENT)
Dept: FAMILY MEDICINE CLINIC | Facility: CLINIC | Age: 65
End: 2020-05-27

## 2020-05-27 DIAGNOSIS — E03.9 ACQUIRED HYPOTHYROIDISM: ICD-10-CM

## 2020-05-27 DIAGNOSIS — N18.30 CHRONIC KIDNEY INSUFFICIENCY, STAGE 3 (MODERATE) (HCC): ICD-10-CM

## 2020-05-27 DIAGNOSIS — E78.2 MIXED HYPERLIPIDEMIA: ICD-10-CM

## 2020-05-27 DIAGNOSIS — I10 BENIGN ESSENTIAL HYPERTENSION: ICD-10-CM

## 2020-05-27 DIAGNOSIS — Z79.4 TYPE 2 DIABETES MELLITUS WITH HYPERGLYCEMIA, WITH LONG-TERM CURRENT USE OF INSULIN (HCC): ICD-10-CM

## 2020-05-27 DIAGNOSIS — R56.9 SEIZURE AS LATE EFFECT OF CEREBROVASCULAR ACCIDENT (CVA) (HCC): Primary | ICD-10-CM

## 2020-05-27 DIAGNOSIS — E11.65 TYPE 2 DIABETES MELLITUS WITH HYPERGLYCEMIA, WITH LONG-TERM CURRENT USE OF INSULIN (HCC): ICD-10-CM

## 2020-05-27 DIAGNOSIS — I69.398 SEIZURE AS LATE EFFECT OF CEREBROVASCULAR ACCIDENT (CVA) (HCC): Primary | ICD-10-CM

## 2020-05-27 PROCEDURE — 99212 OFFICE O/P EST SF 10 MIN: CPT | Performed by: FAMILY MEDICINE

## 2020-05-27 NOTE — PROGRESS NOTES
Subjective   Isaias Molina is a 64 y.o. male.   Chief Complaint   Patient presents with   • Seizures       History of Present Illness   You have chosen to receive care through a telephone visit. Do you consent to use a telephone visit for your medical care todayGaldino Resendez presents today for a f/u on his seizures.  He states he has not got to see the neurologist yet  does not like getting out of the house right now due to the pandemic and neither does his friend whom he relies upon for transportation. Patient states he has not had anymore seizures since being placed on the Keppra.  He believes he is tolerating it well.  He states he sleeps a lot and has done this for a long time.  This may be a little worse since the Keppra.  He states he is a little unsteady on his feet from time to time.  He cannot relate that this is necessarily worse since being on the Keppra. (Known prior diagnosis of abnormal gait)    HTN - has a blood pressure cuff at home but has not checked his blood pressure recently.    His other problems including depression, hypothyroidism, diabetes, high cholesterol, CKD are as far as he knows under control.  He denies any side effects of any medications.    Patient Active Problem List    Diagnosis Date Noted   • Seizure as late effect of cerebrovascular accident (CVA) (CMS/[facility]) 04/29/2020   • Elevated PSA 01/20/2020   • Acquired hypothyroidism 01/20/2020   • Chronic kidney insufficiency, stage 3 (moderate) (CMS/[facility]) 11/25/2019     Note Last Updated: 11/25/2019     ? Due to diabetic nephropathy     • Coronary artery disease 07/08/2019     Note Last Updated: 7/8/2019     On cath with stent x 2 in 2011     • Mixed hyperlipidemia 07/08/2019   • Obesity 07/08/2019   • Type 2 diabetes mellitus (CMS/[facility]) 07/08/2019   • Episode of recurrent major depressive disorder (CMS/[facility]) 07/08/2019   • Benign essential hypertension 02/01/2018   • Vitamin B12 deficiency 02/01/2018   • Cerebrovascular accident  "(CVA) (CMS/[facility]) 01/03/2018     Note Last Updated: 7/8/2019     Occipital.  Ischemic.  2017 - residual vision impairment     • Abnormal gait 12/04/2017   • Homonymous hemianopia 12/04/2017   • Resting tremor 12/04/2017   • Tremor, essential 12/04/2017           Past Surgical History:   Procedure Laterality Date   • CATARACT EXTRACTION WITH INTRAOCULAR LENS IMPLANT Bilateral    • CORONARY ANGIOPLASTY WITH STENT PLACEMENT  2011    PTCA - LCx & RCA with stent   • ROTATOR CUFF REPAIR Right    • TOTAL SHOULDER REVERSE ARTHROPLASTY Right     after fracture 2018     Current Outpatient Medications on File Prior to Visit   Medication Sig   • amLODIPine (NORVASC) 5 MG tablet Take 1 tablet by mouth Daily.   • aspirin 81 MG EC tablet Take 81 mg by mouth Daily.   • benazepril (LOTENSIN) 40 MG tablet Take 1 tablet by mouth Daily.   • clopidogrel (PLAVIX) 75 MG tablet Take 1 tablet by mouth Daily.   • Cyanocobalamin (B-12 COMPLIANCE INJECTION) 1000 MCG/ML kit B-12 COMPLIANCE INJECTION 1000 MCG/ML KIT   • docusate sodium (COLACE) 100 MG capsule Take 1 capsule by mouth Daily.   • escitalopram (LEXAPRO) 10 MG tablet TAKE ONE TABLET BY MOUTH EVERY DAY   • glucose blood (ACCU-CHEK ROLANDO) test strip 1 each by Other route Daily. Use as instructed   • hydrochlorothiazide (HYDRODIURIL) 25 MG tablet TAKE ONE TABLET BY MOUTH EVERY DAY   • Insulin Syringe-Needle U-100 (ULTICARE INSULIN SYRINGE) 31G X 5/16\" 0.5 ML misc ULTICARE INSULIN SYRINGE 31G X 5/16\" 0.5 ML   • ketoconazole (NIZORAL) 2 % cream KETOCONAZOLE 2 % CREA   • Lancets (ACCU-CHEK SOFT TOUCH) lancets Use with device and strips to check blood glucose   • levETIRAcetam (Keppra) 500 MG tablet Take 1 tablet by mouth 2 (Two) Times a Day.   • levothyroxine (SYNTHROID) 25 MCG tablet Take 1 tablet by mouth Daily.   • metFORMIN (GLUCOPHAGE) 500 MG tablet TAKE ONE TABLET BY MOUTH TWICE DAILY with meals   • NOVOLIN 70/30 (70-30) 100 UNIT/ML injection inject 50 units SUBCUTANEOUSLY EVERY " MORNING AND 40 units SUBCUTANEOUSLY EVERY EVENING   • pravastatin (PRAVACHOL) 40 MG tablet Take 1 tablet by mouth Daily.   • tamsulosin (FLOMAX) 0.4 MG capsule 24 hr capsule TAKE ONE CAPSULE BY MOUTH EVERY DAY     No current facility-administered medications on file prior to visit.      Allergies   Allergen Reactions   • Shellfish Allergy Nausea And Vomiting     Social History     Socioeconomic History   • Marital status:      Spouse name: Not on file   • Number of children: Not on file   • Years of education: Not on file   • Highest education level: Not on file   Tobacco Use   • Smoking status: Never Smoker   • Smokeless tobacco: Never Used   Substance and Sexual Activity   • Alcohol use: Never     Frequency: Never   • Drug use: Never   • Sexual activity: Defer     Family History   Problem Relation Age of Onset   • Cerebral aneurysm Father    • Peripheral vascular disease Brother    • Diabetes type II Brother    • Nephrotic syndrome Grandson    • Nephrotic syndrome Granddaughter      The following portions of the patient's history were reviewed and updated as appropriate: allergies, current medications, past family history, past medical history, past social history, past surgical history and problem list.    Review of Systems   Constitutional: Negative for chills and fever.   Respiratory: Negative for cough and shortness of breath.    Cardiovascular: Negative for chest pain and leg swelling.   Gastrointestinal: Negative for abdominal pain.   Neurological: Negative for dizziness and headache.       Objective   There were no vitals taken for this visit.  Physical Exam  Unable to do physical examination due to the telephonic nature of the visit.  Sounds good.  A & O x 3.  No confusion.      No visits with results within 4 Month(s) from this visit.   Latest known visit with results is:   Admission on 01/05/2020, Discharged on 01/05/2020   Component Date Value Ref Range Status   • Glucose 01/05/2020 268* 65 - 99  mg/dL Final   • BUN 01/05/2020 23  8 - 23 mg/dL Final   • Creatinine 01/05/2020 1.69* 0.76 - 1.27 mg/dL Final   • Sodium 01/05/2020 137  136 - 145 mmol/L Final   • Potassium 01/05/2020 4.8  3.5 - 5.2 mmol/L Final   • Chloride 01/05/2020 103  98 - 107 mmol/L Final   • CO2 01/05/2020 23.0  22.0 - 29.0 mmol/L Final   • Calcium 01/05/2020 9.3  8.6 - 10.5 mg/dL Final   • eGFR Non African Amer 01/05/2020 41* >60 mL/min/1.73 Final   • BUN/Creatinine Ratio 01/05/2020 13.6  7.0 - 25.0 Final   • Anion Gap 01/05/2020 11.0  5.0 - 15.0 mmol/L Final   • Troponin T 01/05/2020 0.020  0.000 - 0.030 ng/mL Final   • WBC 01/05/2020 12.00* 3.40 - 10.80 10*3/mm3 Final   • RBC 01/05/2020 4.97  4.14 - 5.80 10*6/mm3 Final   • Hemoglobin 01/05/2020 14.3  13.0 - 17.7 g/dL Final   • Hematocrit 01/05/2020 41.2  37.5 - 51.0 % Final   • MCV 01/05/2020 83.0  79.0 - 97.0 fL Final   • MCH 01/05/2020 28.8  26.6 - 33.0 pg Final   • MCHC 01/05/2020 34.7  31.5 - 35.7 g/dL Final   • RDW 01/05/2020 14.6  12.3 - 15.4 % Final   • RDW-SD 01/05/2020 42.9  37.0 - 54.0 fl Final   • MPV 01/05/2020 9.8  6.0 - 12.0 fL Final   • Platelets 01/05/2020 203  140 - 450 10*3/mm3 Final   • Neutrophil % 01/05/2020 84.7* 42.7 - 76.0 % Final   • Lymphocyte % 01/05/2020 8.5* 19.6 - 45.3 % Final   • Monocyte % 01/05/2020 4.8* 5.0 - 12.0 % Final   • Eosinophil % 01/05/2020 1.6  0.3 - 6.2 % Final   • Basophil % 01/05/2020 0.4  0.0 - 1.5 % Final   • Neutrophils, Absolute 01/05/2020 10.10* 1.70 - 7.00 10*3/mm3 Final   • Lymphocytes, Absolute 01/05/2020 1.00  0.70 - 3.10 10*3/mm3 Final   • Monocytes, Absolute 01/05/2020 0.60  0.10 - 0.90 10*3/mm3 Final   • Eosinophils, Absolute 01/05/2020 0.20  0.00 - 0.40 10*3/mm3 Final   • Basophils, Absolute 01/05/2020 0.00  0.00 - 0.20 10*3/mm3 Final   • nRBC 01/05/2020 0.1  0.0 - 0.2 /100 WBC Final         Assessment/Plan   Diagnoses and all orders for this visit:    1. Seizure as late effect of cerebrovascular accident (CVA) (CMS/[facility])  (Primary)    2. Benign essential hypertension    3. Mixed hyperlipidemia    4. Type 2 diabetes mellitus with hyperglycemia, with long-term current use of insulin (CMS/[facility])    5. Acquired hypothyroidism    6. Chronic kidney insufficiency, stage 3 (moderate) (CMS/[facility])    Recommend continuing current dose of Keppra.  Very difficult to tell whether this could be contributing to his somnolence.  Typically it is a well-tolerated medication.  Will defer a decision about decreasing the dose to neurology.  As far as being a little unsteady on his feet from time to time, I likewise chalked that up to a bit of deconditioning.  Do reschedule his consult with a neurologist when he is comfortable and his  is comfortable to take him down there.  I would like to see him here in the office in about 2 months.  He has a PSA due for his urologist soon and we will recheck any appropriate labs in July.    Overall, 9 minutes spent on the phone with Isaias hanks.         Return in about 2 months (around 7/27/2020).    Call with any problems or concerns before next visit

## 2020-07-26 PROBLEM — E11.319 DIABETIC RETINOPATHY (HCC): Status: ACTIVE | Noted: 2020-07-26

## 2020-08-17 RX ORDER — HUMAN INSULIN 100 [USP'U]/ML
INJECTION, SUSPENSION SUBCUTANEOUS
Qty: 30 ML | Refills: 5 | Status: SHIPPED | OUTPATIENT
Start: 2020-08-17 | End: 2021-06-13 | Stop reason: HOSPADM

## 2020-09-09 DIAGNOSIS — F33.1 MODERATE EPISODE OF RECURRENT MAJOR DEPRESSIVE DISORDER (HCC): ICD-10-CM

## 2020-09-09 RX ORDER — ESCITALOPRAM OXALATE 10 MG/1
TABLET ORAL
Qty: 30 TABLET | Refills: 5 | Status: SHIPPED | OUTPATIENT
Start: 2020-09-09

## 2020-11-18 NOTE — TELEPHONE ENCOUNTER
Received refill request for patient's Docusil. Med is loaded and ready to send. Thanks.  Last OV: 11-25-19  Next OV: None   Last Labs: 7-8-19  
no

## 2020-12-10 RX ORDER — HYDROCHLOROTHIAZIDE 25 MG/1
TABLET ORAL
Qty: 90 TABLET | Refills: 3 | Status: SHIPPED | OUTPATIENT
Start: 2020-12-10 | End: 2021-06-13 | Stop reason: HOSPADM

## 2021-04-23 RX ORDER — TAMSULOSIN HYDROCHLORIDE 0.4 MG/1
CAPSULE ORAL
Qty: 60 CAPSULE | Refills: 11 | Status: SHIPPED | OUTPATIENT
Start: 2021-04-23

## 2021-06-04 ENCOUNTER — HOSPITAL ENCOUNTER (INPATIENT)
Facility: HOSPITAL | Age: 66
LOS: 9 days | Discharge: NURSING, REHAB OR LONG-TERM CARE | End: 2021-06-13
Attending: INTERNAL MEDICINE | Admitting: INTERNAL MEDICINE

## 2021-06-04 DIAGNOSIS — N17.9 ACUTE KIDNEY INJURY SUPERIMPOSED ON CHRONIC KIDNEY DISEASE (HCC): ICD-10-CM

## 2021-06-04 DIAGNOSIS — R94.39 ABNORMAL NUCLEAR STRESS TEST: ICD-10-CM

## 2021-06-04 DIAGNOSIS — N18.9 ACUTE KIDNEY INJURY SUPERIMPOSED ON CHRONIC KIDNEY DISEASE (HCC): ICD-10-CM

## 2021-06-04 DIAGNOSIS — R77.8 ELEVATED TROPONIN LEVEL: Primary | ICD-10-CM

## 2021-06-04 PROBLEM — I16.1 HYPERTENSIVE EMERGENCY: Status: ACTIVE | Noted: 2021-06-04

## 2021-06-04 PROBLEM — R79.89 ELEVATED TROPONIN LEVEL: Status: ACTIVE | Noted: 2021-06-04

## 2021-06-04 LAB — GLUCOSE BLDC GLUCOMTR-MCNC: 146 MG/DL (ref 70–105)

## 2021-06-04 PROCEDURE — 99223 1ST HOSP IP/OBS HIGH 75: CPT | Performed by: INTERNAL MEDICINE

## 2021-06-04 PROCEDURE — 82962 GLUCOSE BLOOD TEST: CPT

## 2021-06-04 RX ORDER — INSULIN LISPRO 100 [IU]/ML
0-14 INJECTION, SOLUTION INTRAVENOUS; SUBCUTANEOUS AS NEEDED
Status: DISCONTINUED | OUTPATIENT
Start: 2021-06-04 | End: 2021-06-13 | Stop reason: HOSPADM

## 2021-06-04 RX ORDER — HYDRALAZINE HYDROCHLORIDE 10 MG/1
10 TABLET, FILM COATED ORAL EVERY 6 HOURS PRN
Status: DISCONTINUED | OUTPATIENT
Start: 2021-06-04 | End: 2021-06-05 | Stop reason: SDUPTHER

## 2021-06-04 RX ORDER — ONDANSETRON 2 MG/ML
4 INJECTION INTRAMUSCULAR; INTRAVENOUS EVERY 6 HOURS PRN
Status: DISCONTINUED | OUTPATIENT
Start: 2021-06-04 | End: 2021-06-13 | Stop reason: HOSPADM

## 2021-06-04 RX ORDER — SODIUM CHLORIDE 0.9 % (FLUSH) 0.9 %
10 SYRINGE (ML) INJECTION EVERY 12 HOURS SCHEDULED
Status: DISCONTINUED | OUTPATIENT
Start: 2021-06-05 | End: 2021-06-13 | Stop reason: HOSPADM

## 2021-06-04 RX ORDER — NICOTINE POLACRILEX 4 MG
15 LOZENGE BUCCAL
Status: DISCONTINUED | OUTPATIENT
Start: 2021-06-04 | End: 2021-06-13 | Stop reason: HOSPADM

## 2021-06-04 RX ORDER — CLOPIDOGREL BISULFATE 75 MG/1
75 TABLET ORAL DAILY
Status: DISCONTINUED | OUTPATIENT
Start: 2021-06-05 | End: 2021-06-13 | Stop reason: HOSPADM

## 2021-06-04 RX ORDER — ASPIRIN 81 MG/1
81 TABLET ORAL DAILY
Status: DISCONTINUED | OUTPATIENT
Start: 2021-06-05 | End: 2021-06-13 | Stop reason: HOSPADM

## 2021-06-04 RX ORDER — LEVETIRACETAM 500 MG/1
500 TABLET ORAL 2 TIMES DAILY
Status: DISCONTINUED | OUTPATIENT
Start: 2021-06-05 | End: 2021-06-13 | Stop reason: HOSPADM

## 2021-06-04 RX ORDER — DOCUSATE SODIUM 100 MG/1
100 CAPSULE, LIQUID FILLED ORAL DAILY
Status: DISCONTINUED | OUTPATIENT
Start: 2021-06-05 | End: 2021-06-13 | Stop reason: HOSPADM

## 2021-06-04 RX ORDER — LEVOTHYROXINE SODIUM 0.03 MG/1
25 TABLET ORAL
Status: DISCONTINUED | OUTPATIENT
Start: 2021-06-05 | End: 2021-06-13 | Stop reason: HOSPADM

## 2021-06-04 RX ORDER — DEXTROSE MONOHYDRATE 25 G/50ML
25 INJECTION, SOLUTION INTRAVENOUS
Status: DISCONTINUED | OUTPATIENT
Start: 2021-06-04 | End: 2021-06-13 | Stop reason: HOSPADM

## 2021-06-04 RX ORDER — INSULIN GLARGINE 100 [IU]/ML
10 INJECTION, SOLUTION SUBCUTANEOUS NIGHTLY
Status: DISCONTINUED | OUTPATIENT
Start: 2021-06-05 | End: 2021-06-07

## 2021-06-04 RX ORDER — ATORVASTATIN CALCIUM 10 MG/1
10 TABLET, FILM COATED ORAL DAILY
Refills: 5 | Status: DISCONTINUED | OUTPATIENT
Start: 2021-06-05 | End: 2021-06-06

## 2021-06-04 RX ORDER — AMLODIPINE BESYLATE 5 MG/1
10 TABLET ORAL DAILY
Status: DISCONTINUED | OUTPATIENT
Start: 2021-06-05 | End: 2021-06-13 | Stop reason: HOSPADM

## 2021-06-04 RX ORDER — INSULIN LISPRO 100 [IU]/ML
0-14 INJECTION, SOLUTION INTRAVENOUS; SUBCUTANEOUS
Status: DISCONTINUED | OUTPATIENT
Start: 2021-06-05 | End: 2021-06-13 | Stop reason: HOSPADM

## 2021-06-04 RX ORDER — HEPARIN SODIUM 5000 [USP'U]/ML
5000 INJECTION, SOLUTION INTRAVENOUS; SUBCUTANEOUS EVERY 8 HOURS SCHEDULED
Status: DISCONTINUED | OUTPATIENT
Start: 2021-06-05 | End: 2021-06-05

## 2021-06-04 RX ORDER — SODIUM CHLORIDE 0.9 % (FLUSH) 0.9 %
10 SYRINGE (ML) INJECTION AS NEEDED
Status: DISCONTINUED | OUTPATIENT
Start: 2021-06-04 | End: 2021-06-13 | Stop reason: HOSPADM

## 2021-06-04 RX ORDER — ESCITALOPRAM OXALATE 10 MG/1
10 TABLET ORAL DAILY
Status: DISCONTINUED | OUTPATIENT
Start: 2021-06-05 | End: 2021-06-13 | Stop reason: HOSPADM

## 2021-06-04 RX ORDER — METOPROLOL TARTRATE 50 MG/1
50 TABLET, FILM COATED ORAL EVERY 12 HOURS SCHEDULED
Status: DISCONTINUED | OUTPATIENT
Start: 2021-06-05 | End: 2021-06-05

## 2021-06-04 RX ORDER — SODIUM CHLORIDE 9 MG/ML
100 INJECTION, SOLUTION INTRAVENOUS CONTINUOUS
Status: DISCONTINUED | OUTPATIENT
Start: 2021-06-05 | End: 2021-06-06

## 2021-06-04 RX ORDER — ACETAMINOPHEN 325 MG/1
650 TABLET ORAL EVERY 4 HOURS PRN
Status: DISCONTINUED | OUTPATIENT
Start: 2021-06-04 | End: 2021-06-09 | Stop reason: SDUPTHER

## 2021-06-04 RX ORDER — INSULIN LISPRO 100 [IU]/ML
5 INJECTION, SOLUTION INTRAVENOUS; SUBCUTANEOUS
Status: DISCONTINUED | OUTPATIENT
Start: 2021-06-05 | End: 2021-06-12

## 2021-06-05 ENCOUNTER — APPOINTMENT (OUTPATIENT)
Dept: ULTRASOUND IMAGING | Facility: HOSPITAL | Age: 66
End: 2021-06-05

## 2021-06-05 LAB
ANION GAP SERPL CALCULATED.3IONS-SCNC: 11 MMOL/L (ref 5–15)
BACTERIA UR QL AUTO: ABNORMAL /HPF
BILIRUB UR QL STRIP: NEGATIVE
BUN SERPL-MCNC: 27 MG/DL (ref 8–23)
BUN/CREAT SERPL: 11.6 (ref 7–25)
CALCIUM SPEC-SCNC: 8 MG/DL (ref 8.6–10.5)
CHLORIDE SERPL-SCNC: 104 MMOL/L (ref 98–107)
CHOLEST SERPL-MCNC: 166 MG/DL (ref 0–200)
CLARITY UR: ABNORMAL
CO2 SERPL-SCNC: 21 MMOL/L (ref 22–29)
COLOR UR: YELLOW
CREAT SERPL-MCNC: 2.33 MG/DL (ref 0.76–1.27)
CREAT UR-MCNC: 227.3 MG/DL
DEPRECATED RDW RBC AUTO: 42 FL (ref 37–54)
ERYTHROCYTE [DISTWIDTH] IN BLOOD BY AUTOMATED COUNT: 14.9 % (ref 12.3–15.4)
GFR SERPL CREATININE-BSD FRML MDRD: 28 ML/MIN/1.73
GLUCOSE BLDC GLUCOMTR-MCNC: 134 MG/DL (ref 70–105)
GLUCOSE BLDC GLUCOMTR-MCNC: 142 MG/DL (ref 70–105)
GLUCOSE BLDC GLUCOMTR-MCNC: 160 MG/DL (ref 70–105)
GLUCOSE BLDC GLUCOMTR-MCNC: 181 MG/DL (ref 70–105)
GLUCOSE SERPL-MCNC: 219 MG/DL (ref 65–99)
GLUCOSE UR STRIP-MCNC: ABNORMAL MG/DL
GRAN CASTS URNS QL MICRO: ABNORMAL /LPF
HCT VFR BLD AUTO: 33.1 % (ref 37.5–51)
HDLC SERPL-MCNC: 22 MG/DL (ref 40–60)
HGB BLD-MCNC: 11.6 G/DL (ref 13–17.7)
HGB UR QL STRIP.AUTO: ABNORMAL
HYALINE CASTS UR QL AUTO: ABNORMAL /LPF
KETONES UR QL STRIP: NEGATIVE
LDLC SERPL CALC-MCNC: 92 MG/DL (ref 0–100)
LDLC/HDLC SERPL: 3.75 {RATIO}
LEUKOCYTE ESTERASE UR QL STRIP.AUTO: NEGATIVE
MCH RBC QN AUTO: 27.8 PG (ref 26.6–33)
MCHC RBC AUTO-ENTMCNC: 35.1 G/DL (ref 31.5–35.7)
MCV RBC AUTO: 79.1 FL (ref 79–97)
NITRITE UR QL STRIP: NEGATIVE
NT-PROBNP SERPL-MCNC: 5272 PG/ML (ref 0–900)
PH UR STRIP.AUTO: <=5 [PH] (ref 5–8)
PHOSPHATE SERPL-MCNC: 3.7 MG/DL (ref 2.5–4.5)
PLATELET # BLD AUTO: 202 10*3/MM3 (ref 140–450)
PMV BLD AUTO: 9.2 FL (ref 6–12)
POTASSIUM SERPL-SCNC: 3.8 MMOL/L (ref 3.5–5.2)
PROT UR QL STRIP: ABNORMAL
PTH-INTACT SERPL-MCNC: 125.2 PG/ML (ref 15–65)
RBC # BLD AUTO: 4.18 10*6/MM3 (ref 4.14–5.8)
RBC # UR: ABNORMAL /HPF
REF LAB TEST METHOD: ABNORMAL
SODIUM SERPL-SCNC: 136 MMOL/L (ref 136–145)
SODIUM UR-SCNC: 55 MMOL/L
SP GR UR STRIP: 1.03 (ref 1–1.03)
SQUAMOUS #/AREA URNS HPF: ABNORMAL /HPF
T4 FREE SERPL-MCNC: 0.98 NG/DL (ref 0.93–1.7)
TRIGL SERPL-MCNC: 308 MG/DL (ref 0–150)
TROPONIN T SERPL-MCNC: 0.25 NG/ML (ref 0–0.03)
TROPONIN T SERPL-MCNC: 0.37 NG/ML (ref 0–0.03)
UROBILINOGEN UR QL STRIP: ABNORMAL
VLDLC SERPL-MCNC: 52 MG/DL (ref 5–40)
WBC # BLD AUTO: 11 10*3/MM3 (ref 3.4–10.8)
WBC UR QL AUTO: ABNORMAL /HPF

## 2021-06-05 PROCEDURE — 80048 BASIC METABOLIC PNL TOTAL CA: CPT | Performed by: INTERNAL MEDICINE

## 2021-06-05 PROCEDURE — 63710000001 INSULIN LISPRO (HUMAN) PER 5 UNITS: Performed by: INTERNAL MEDICINE

## 2021-06-05 PROCEDURE — 63710000001 INSULIN GLARGINE PER 5 UNITS: Performed by: INTERNAL MEDICINE

## 2021-06-05 PROCEDURE — 99222 1ST HOSP IP/OBS MODERATE 55: CPT | Performed by: INTERNAL MEDICINE

## 2021-06-05 PROCEDURE — 80061 LIPID PANEL: CPT | Performed by: INTERNAL MEDICINE

## 2021-06-05 PROCEDURE — 83880 ASSAY OF NATRIURETIC PEPTIDE: CPT | Performed by: INTERNAL MEDICINE

## 2021-06-05 PROCEDURE — 76775 US EXAM ABDO BACK WALL LIM: CPT

## 2021-06-05 PROCEDURE — 82570 ASSAY OF URINE CREATININE: CPT | Performed by: INTERNAL MEDICINE

## 2021-06-05 PROCEDURE — 82962 GLUCOSE BLOOD TEST: CPT

## 2021-06-05 PROCEDURE — 83970 ASSAY OF PARATHORMONE: CPT | Performed by: INTERNAL MEDICINE

## 2021-06-05 PROCEDURE — 25010000002 HYDRALAZINE PER 20 MG: Performed by: INTERNAL MEDICINE

## 2021-06-05 PROCEDURE — 99232 SBSQ HOSP IP/OBS MODERATE 35: CPT | Performed by: INTERNAL MEDICINE

## 2021-06-05 PROCEDURE — 81001 URINALYSIS AUTO W/SCOPE: CPT | Performed by: INTERNAL MEDICINE

## 2021-06-05 PROCEDURE — 84100 ASSAY OF PHOSPHORUS: CPT | Performed by: INTERNAL MEDICINE

## 2021-06-05 PROCEDURE — 84484 ASSAY OF TROPONIN QUANT: CPT | Performed by: INTERNAL MEDICINE

## 2021-06-05 PROCEDURE — 84300 ASSAY OF URINE SODIUM: CPT | Performed by: INTERNAL MEDICINE

## 2021-06-05 PROCEDURE — 25010000002 HEPARIN (PORCINE) PER 1000 UNITS: Performed by: INTERNAL MEDICINE

## 2021-06-05 PROCEDURE — 83036 HEMOGLOBIN GLYCOSYLATED A1C: CPT | Performed by: INTERNAL MEDICINE

## 2021-06-05 PROCEDURE — 85027 COMPLETE CBC AUTOMATED: CPT | Performed by: INTERNAL MEDICINE

## 2021-06-05 PROCEDURE — 84439 ASSAY OF FREE THYROXINE: CPT | Performed by: INTERNAL MEDICINE

## 2021-06-05 RX ORDER — HYDRALAZINE HYDROCHLORIDE 20 MG/ML
10 INJECTION INTRAMUSCULAR; INTRAVENOUS EVERY 4 HOURS PRN
Status: DISCONTINUED | OUTPATIENT
Start: 2021-06-05 | End: 2021-06-13 | Stop reason: HOSPADM

## 2021-06-05 RX ORDER — HEPARIN SODIUM 5000 [USP'U]/ML
5000 INJECTION, SOLUTION INTRAVENOUS; SUBCUTANEOUS EVERY 8 HOURS SCHEDULED
Status: DISCONTINUED | OUTPATIENT
Start: 2021-06-05 | End: 2021-06-05

## 2021-06-05 RX ORDER — HEPARIN SODIUM 5000 [USP'U]/ML
5000 INJECTION, SOLUTION INTRAVENOUS; SUBCUTANEOUS EVERY 12 HOURS SCHEDULED
Status: DISCONTINUED | OUTPATIENT
Start: 2021-06-05 | End: 2021-06-13 | Stop reason: HOSPADM

## 2021-06-05 RX ORDER — TAMSULOSIN HYDROCHLORIDE 0.4 MG/1
0.4 CAPSULE ORAL NIGHTLY
Status: DISCONTINUED | OUTPATIENT
Start: 2021-06-05 | End: 2021-06-13 | Stop reason: HOSPADM

## 2021-06-05 RX ORDER — METOPROLOL SUCCINATE 50 MG/1
50 TABLET, EXTENDED RELEASE ORAL
Status: DISCONTINUED | OUTPATIENT
Start: 2021-06-05 | End: 2021-06-13 | Stop reason: HOSPADM

## 2021-06-05 RX ADMIN — LEVETIRACETAM 500 MG: 500 TABLET ORAL at 21:34

## 2021-06-05 RX ADMIN — DOCUSATE SODIUM 100 MG: 100 CAPSULE, LIQUID FILLED ORAL at 10:00

## 2021-06-05 RX ADMIN — METOPROLOL SUCCINATE 50 MG: 50 TABLET, EXTENDED RELEASE ORAL at 18:24

## 2021-06-05 RX ADMIN — AMLODIPINE BESYLATE 10 MG: 5 TABLET ORAL at 10:00

## 2021-06-05 RX ADMIN — ATORVASTATIN CALCIUM 10 MG: 10 TABLET, FILM COATED ORAL at 10:00

## 2021-06-05 RX ADMIN — ASPIRIN 81 MG: 81 TABLET, COATED ORAL at 10:00

## 2021-06-05 RX ADMIN — LEVOTHYROXINE SODIUM 25 MCG: 0.03 TABLET ORAL at 05:24

## 2021-06-05 RX ADMIN — INSULIN LISPRO 5 UNITS: 100 INJECTION, SOLUTION INTRAVENOUS; SUBCUTANEOUS at 10:00

## 2021-06-05 RX ADMIN — HEPARIN SODIUM 5000 UNITS: 5000 INJECTION INTRAVENOUS; SUBCUTANEOUS at 12:08

## 2021-06-05 RX ADMIN — INSULIN GLARGINE 10 UNITS: 100 INJECTION, SOLUTION SUBCUTANEOUS at 22:14

## 2021-06-05 RX ADMIN — SODIUM CHLORIDE 100 ML/HR: 9 INJECTION, SOLUTION INTRAVENOUS at 04:45

## 2021-06-05 RX ADMIN — LEVETIRACETAM 500 MG: 500 TABLET ORAL at 10:00

## 2021-06-05 RX ADMIN — LEVETIRACETAM 500 MG: 500 TABLET ORAL at 00:25

## 2021-06-05 RX ADMIN — HYDRALAZINE HYDROCHLORIDE 10 MG: 20 INJECTION INTRAMUSCULAR; INTRAVENOUS at 18:24

## 2021-06-05 RX ADMIN — INSULIN LISPRO 3 UNITS: 100 INJECTION, SOLUTION INTRAVENOUS; SUBCUTANEOUS at 12:08

## 2021-06-05 RX ADMIN — HYDRALAZINE HYDROCHLORIDE 10 MG: 10 TABLET, FILM COATED ORAL at 12:07

## 2021-06-05 RX ADMIN — HEPARIN SODIUM 5000 UNITS: 5000 INJECTION INTRAVENOUS; SUBCUTANEOUS at 21:36

## 2021-06-05 RX ADMIN — Medication 10 ML: at 00:29

## 2021-06-05 RX ADMIN — TAMSULOSIN HYDROCHLORIDE 0.4 MG: 0.4 CAPSULE ORAL at 21:36

## 2021-06-05 RX ADMIN — INSULIN GLARGINE 10 UNITS: 100 INJECTION, SOLUTION SUBCUTANEOUS at 00:26

## 2021-06-05 RX ADMIN — INSULIN LISPRO 5 UNITS: 100 INJECTION, SOLUTION INTRAVENOUS; SUBCUTANEOUS at 12:08

## 2021-06-05 RX ADMIN — ESCITALOPRAM OXALATE 10 MG: 10 TABLET ORAL at 10:00

## 2021-06-05 RX ADMIN — METOPROLOL TARTRATE 50 MG: 50 TABLET, FILM COATED ORAL at 10:00

## 2021-06-05 RX ADMIN — HYDRALAZINE HYDROCHLORIDE 10 MG: 10 TABLET, FILM COATED ORAL at 05:24

## 2021-06-05 RX ADMIN — INSULIN LISPRO 5 UNITS: 100 INJECTION, SOLUTION INTRAVENOUS; SUBCUTANEOUS at 18:23

## 2021-06-05 RX ADMIN — Medication 10 ML: at 10:00

## 2021-06-05 RX ADMIN — CLOPIDOGREL BISULFATE 75 MG: 75 TABLET ORAL at 10:00

## 2021-06-05 RX ADMIN — METOPROLOL TARTRATE 50 MG: 50 TABLET, FILM COATED ORAL at 00:25

## 2021-06-05 RX ADMIN — HYDRALAZINE HYDROCHLORIDE 10 MG: 20 INJECTION INTRAMUSCULAR; INTRAVENOUS at 22:17

## 2021-06-05 NOTE — CONSULTS
CARDIOLOGY CONSULT NOTE      Referring Provider: Dr. Neri    Reason for Consultation: CAD/Elevated Troponin    Attending: Courtney Neri DO    Chief complaint    Chest Pain    Subjective .     History of present illness:  Isaias Molina is a 65 y.o. male who presents with patient came to [facility] through the ED at [facility] in [place]. He reports that he was having uncontrolled blood pressure at home..     Patient has a history of coronary artery disease with previous PCI to the left circumflex and RCA in 2011.    The patient reports in the last month he has stopped many of his medications including antihypertensive agents, his statin and Plavix.    He reports that he was having periods of lightheadedness and dizziness and thought his blood pressure was running low so he stopped these medications.    He reports with Covid he has not been out to the doctor and missed several follow-ups.    He denies any exertional chest pain or dyspnea but reports yesterday he was feeling unwell which prompted his visit to the emergency room.    Yesterday he was unaware of having a seizure. However this morning he reports he thinks he had a seizure yesterday because his muscles feel sore and tight and he bit his tongue.    He denies any current chest pain  In the emergency room at [facility] it was reported that his troponin was 1.0          Review of Systems   Constitutional: Positive for malaise/fatigue. Negative for decreased appetite and diaphoresis.   HENT: Negative for congestion, hearing loss and nosebleeds.    Cardiovascular: Negative for chest pain, claudication, dyspnea on exertion, irregular heartbeat, leg swelling, near-syncope, orthopnea, palpitations, paroxysmal nocturnal dyspnea and syncope.   Respiratory: Negative for cough, shortness of breath and sleep disturbances due to breathing.    Endocrine: Negative for polyuria.   Hematologic/Lymphatic: Does not bruise/bleed easily.   Skin:  Negative for itching and rash.   Musculoskeletal: Positive for muscle cramps. Negative for back pain, muscle weakness and myalgias.   Gastrointestinal: Negative for abdominal pain, change in bowel habit and nausea.   Genitourinary: Negative for dysuria, flank pain, frequency and hesitancy.   Neurological: Positive for seizures and weakness. Negative for dizziness and tremors.   Psychiatric/Behavioral: Negative for altered mental status. The patient does not have insomnia.        History  Past Medical History:   Diagnosis Date   • Cellulitis 10/15/2013   • Encounter for screening for malignant neoplasm of prostate 12/4/2017   • Encounter for screening for other disorder 12/4/2017   • Need for prophylactic vaccination against Streptococcus pneumoniae (pneumococcus) 10/19/2018   • Onychomycosis 10/19/2018   • Pain in right shoulder 10/3/2018   • TIA (transient ischemic attack) 01/05/2020       Past Surgical History:   Procedure Laterality Date   • CATARACT EXTRACTION WITH INTRAOCULAR LENS IMPLANT Bilateral    • CORONARY ANGIOPLASTY WITH STENT PLACEMENT  2011    PTCA - LCx & RCA with stent   • ROTATOR CUFF REPAIR Right    • TOTAL SHOULDER REVERSE ARTHROPLASTY Right     after fracture 2018       Family History   Problem Relation Age of Onset   • Cerebral aneurysm Father    • Peripheral vascular disease Brother    • Diabetes type II Brother    • Nephrotic syndrome Grandson    • Nephrotic syndrome Granddaughter        Social History     Tobacco Use   • Smoking status: Never Smoker   • Smokeless tobacco: Never Used   Substance Use Topics   • Alcohol use: Never   • Drug use: Never        Medications Prior to Admission   Medication Sig Dispense Refill Last Dose   • amLODIPine (NORVASC) 5 MG tablet Take 1 tablet by mouth Daily. 90 tablet 3 5/15/2021 at 0900   • aspirin 81 MG EC tablet Take 81 mg by mouth Daily.   6/2/2021   • benazepril (LOTENSIN) 40 MG tablet Take 1 tablet by mouth Daily. 90 tablet 3 5/15/2021 at 0900   •  "clopidogrel (PLAVIX) 75 MG tablet Take 1 tablet by mouth Daily. 90 tablet 3 5/15/2021 at 0900   • Cyanocobalamin (B-12 COMPLIANCE INJECTION) 1000 MCG/ML kit B-12 COMPLIANCE INJECTION 1000 MCG/ML KIT      • docusate sodium (COLACE) 100 MG capsule Take 1 capsule by mouth Daily. 90 capsule 0 5/15/2021   • escitalopram (LEXAPRO) 10 MG tablet TAKE ONE TABLET BY MOUTH EVERY DAY 30 tablet 5    • glucose blood (ACCU-CHEK ROLANDO) test strip 1 each by Other route Daily. Use as instructed 100 each 3    • hydroCHLOROthiazide (HYDRODIURIL) 25 MG tablet TAKE ONE TABLET BY MOUTH EVERY DAY 90 tablet 3 5/15/2021   • Insulin Syringe-Needle U-100 (ULTICARE INSULIN SYRINGE) 31G X 5/16\" 0.5 ML misc ULTICARE INSULIN SYRINGE 31G X 5/16\" 0.5 ML   5/27/2021   • ketoconazole (NIZORAL) 2 % cream KETOCONAZOLE 2 % CREA      • Lancets (ACCU-CHEK SOFT TOUCH) lancets Use with device and strips to check blood glucose 100 each 3 5/27/2021   • levETIRAcetam (Keppra) 500 MG tablet Take 1 tablet by mouth 2 (Two) Times a Day. 60 tablet 5 6/4/2021 at 0900   • levothyroxine (SYNTHROID) 25 MCG tablet Take 1 tablet by mouth Daily. 30 tablet 5    • metFORMIN (GLUCOPHAGE) 500 MG tablet TAKE ONE TABLET BY MOUTH TWICE DAILY with meals 180 tablet 3 5/15/2021   • NOVOLIN 70/30 (70-30) 100 UNIT/ML injection inject 50 units SUBCUTANEOUSLY EVERY MORNING AND 40 units SUBCUTANEOUSLY EVERY EVENING 30 mL 5 6/1/2021   • pravastatin (PRAVACHOL) 40 MG tablet Take 1 tablet by mouth Daily. 30 tablet 5 5/15/2021   • tamsulosin (FLOMAX) 0.4 MG capsule 24 hr capsule TAKE ONE CAPSULE BY MOUTH TWICE DAILY 60 capsule 11          Shellfish allergy    Scheduled Meds:amLODIPine, 10 mg, Oral, Daily  aspirin, 81 mg, Oral, Daily  atorvastatin, 10 mg, Oral, Daily  clopidogrel, 75 mg, Oral, Daily  docusate sodium, 100 mg, Oral, Daily  escitalopram, 10 mg, Oral, Daily  heparin (porcine), 5,000 Units, Subcutaneous, Q8H  insulin glargine, 10 Units, Subcutaneous, Nightly  insulin lispro, 0-14 " "Units, Subcutaneous, TID AC  insulin lispro, 5 Units, Subcutaneous, TID With Meals  levETIRAcetam, 500 mg, Oral, BID  levothyroxine, 25 mcg, Oral, Q AM  metoprolol tartrate, 50 mg, Oral, Q12H  sodium chloride, 10 mL, Intravenous, Q12H      Continuous Infusions:sodium chloride, 100 mL/hr, Last Rate: 100 mL/hr (06/05/21 0445)      PRN Meds:.•  acetaminophen  •  dextrose  •  dextrose  •  glucagon (human recombinant)  •  hydrALAZINE  •  insulin lispro **AND** insulin lispro  •  ondansetron  •  sodium chloride    Objective     VITAL SIGNS  Vitals:    06/05/21 0025 06/05/21 0255 06/05/21 0500 06/05/21 0524   BP: 142/73 145/79 154/94 171/86   BP Location:   Right arm    Patient Position:   Lying    Pulse: 70 64  67   Resp:  18 18    Temp:  97.8 °F (36.6 °C) 98.1 °F (36.7 °C)    TempSrc:  Oral Oral    SpO2:  97%     Weight:       Height:           Flowsheet Rows      First Filed Value   Admission Height  175.3 cm (69\") Documented at 06/04/2021 2120   Admission Weight  107 kg (234 lb 12.6 oz) Documented at 06/04/2021 2120          Body mass index is 34.67 kg/m².     TELEMETRY: SR    Physical Exam:  Vitals reviewed.   Constitutional:       General: Not in acute distress.     Appearance: Normal appearance. Well-developed and not in distress. Chronically ill-appearing.   Eyes:      Pupils: Pupils are equal, round, and reactive to light.   HENT:      Head: Normocephalic and atraumatic.   Neck:      Vascular: No JVD.   Pulmonary:      Effort: Pulmonary effort is normal.      Breath sounds: Normal breath sounds.   Cardiovascular:      Normal rate. Regular rhythm.   Pulses:     Intact distal pulses.   Edema:     Peripheral edema absent.   Abdominal:      General: There is no distension.      Palpations: Abdomen is soft.      Tenderness: There is no abdominal tenderness.   Musculoskeletal: Normal range of motion.      Cervical back: Normal range of motion and neck supple. Skin:     General: Skin is warm and dry.   Neurological:      " Mental Status: Alert, oriented to person, place, and time and oriented to person, place and time.          Results Review:   I reviewed the patient's new clinical results.    CBC    Results from last 7 days   Lab Units 06/05/21  0000   WBC 10*3/mm3 11.00*   HEMOGLOBIN g/dL 11.6*   PLATELETS 10*3/mm3 202     BMP   Results from last 7 days   Lab Units 06/05/21  0001   SODIUM mmol/L 136   POTASSIUM mmol/L 3.8   CHLORIDE mmol/L 104   CO2 mmol/L 21.0*   BUN mg/dL 27*   CREATININE mg/dL 2.33*   GLUCOSE mg/dL 219*   PHOSPHORUS mg/dL 3.7     Cr Clearance Estimated Creatinine Clearance: 38.1 mL/min (A) (by C-G formula based on SCr of 2.33 mg/dL (H)).  Coag     HbA1C   Lab Results   Component Value Date    HGBA1C 7.1 (H) 12/31/2019    HGBA1C 7.6 (H) 07/08/2019     Blood Glucose   Glucose   Date/Time Value Ref Range Status   06/05/2021 0724 142 (H) 70 - 105 mg/dL Final     Comment:     Serial Number: [id number]:  [id number]   06/04/2021 2123 146 (H) 70 - 105 mg/dL Final     Comment:     Serial Number: [id number]:  [id number]     Infection     CMP   Results from last 7 days   Lab Units 06/05/21  0001   SODIUM mmol/L 136   POTASSIUM mmol/L 3.8   CHLORIDE mmol/L 104   CO2 mmol/L 21.0*   BUN mg/dL 27*   CREATININE mg/dL 2.33*   GLUCOSE mg/dL 219*     ABG      UA      ARTI  No results found for: POCMETH, POCAMPHET, POCBARBITUR, POCBENZO, POCCOCAINE, POCOPIATES, POCOXYCODO, POCPHENCYC, POCPROPOXY, POCTHC, POCTRICYC  Lysis Labs   Results from last 7 days   Lab Units 06/05/21  0001 06/05/21  0000   HEMOGLOBIN g/dL  --  11.6*   PLATELETS 10*3/mm3  --  202   CREATININE mg/dL 2.33*  --      Radiology(recent) No radiology results for the last day    Results from last 7 days   Lab Units 06/05/21  0001   TROPONIN T ng/mL 0.248*       Imaging Results (Last 24 Hours)     ** No results found for the last 24 hours. **          EKG      I personally viewed and interpreted the patient's EKG/Telemetry  data:    ECHOCARDIOGRAM:      STRESS MYOVIEW:    CARDIAC CATHETERIZATION:    OTHER:         Assessment/Plan       Acute kidney injury superimposed on chronic kidney disease (CMS/HCC)    Hypertensive emergency    Elevated troponin level      Assessment:    Elevated troponin    -1.0 at outlying facility, 0.233 here  CAD with prior PCI to LCx, RCA 2011  Uncontrolled HTN:  Pt has stopped all antihypertensive agents for last month  DM  Dyslipidemia  Hx Seizure disorder with possible Seizure  Non compliance with medical therapy    Plan:    Echocardiogram to assess LV function  Serial Troponins  Oral Medications have been resumed  Will follow BP  Resume DAPT  Will likely need cardiac catheterization for definitive assessment of coronary anatomy and reassessment of prior PCI.    Will recheck Cr in am consider nephrology consultation due to elevated Cr  Seizure reported by pt.   D/W Dr. Neri  Counseled re: compliance with medical therapy  Additional recommendations per Dr. Jorge      I discussed the patients findings and my recommendations with patient and RN    NILS Olivia  06/05/21  08:55 EDT     Cardiology attending addendum note:  This patient was seen and examined by me personally; clinical events and data including images were reviewed by me in detail I have also reviewed for accuracy the consultation note scribed for me by LARY WRAY above and I concur with clinical exam description as well as assessment and plan as outlined in discussed with her earlier.    The patient was lying comfortably in bed on nasal cannula with stable vital signs and satisfactory SaO2 and no specific cardiopulmonary complaints at time of my encounter.  He was exhibiting normal respiratory rate and pattern with only mildly diminished airflow to both lung bases.  Heart tones normal in sinus rhythm.  Abdomen soft without tenderness or masses.  Extremities puffy but no pitting edema and distal pulses  adequate.    Recommendation:  -I will review personally echocardiogram completed later today  -Follow serial troponins  -Resume dual antiplatelet therapy  Maintenance medications and emphasized need for medical compliance  -We will follow BP and hemodynamics closely

## 2021-06-05 NOTE — CONSULTS
Patient Care Team:  Marizol Puentes MD as PCP - General (Family Medicine)    Chief complaint: malaise post seizure    Subjective     Asked to see this unfortunate elderly wm for tosha.  His creatinine was last measured at 1.8 and is worse now prompting renal consultation. He sees my associate dr aiken for ckd. He hasnt been taking his bp meds which is a chronic problem. They make him feel bad, weak, tired, and dizzy. Flomax worsens these symptoms. He can't void and is incontinent without the flomax. He went to the er after a seizure yesterday.  He bit his tongue.  He takes no nsaids,illegal drugs, oralcohol.  He is not soa and has some abdominal muscle and back soreness.  He has no cough, fever, chills, rashes, or hemoptysis or gross hematuria.      Review of Systems   Constitutional: Positive for fatigue. Negative for activity change, appetite change, chills, diaphoresis, fever and unexpected weight change.   HENT: Negative.    Eyes: Negative.    Respiratory: Negative.    Cardiovascular: Negative.    Gastrointestinal: Negative.    Endocrine: Negative.    Genitourinary: Positive for difficulty urinating and urgency. Negative for decreased urine volume, discharge, dysuria, enuresis, flank pain, frequency, genital sores, hematuria, penile pain, penile swelling, scrotal swelling and testicular pain.   Musculoskeletal: Negative.    Skin: Negative.    Allergic/Immunologic: Negative.    Neurological: Positive for seizures. Negative for tremors, syncope, facial asymmetry, speech difficulty, weakness, light-headedness, numbness and headaches.   Hematological: Negative.    Psychiatric/Behavioral: Negative.    All other systems reviewed and are negative.       Past Medical History:   Diagnosis Date   • Cellulitis 10/15/2013   • Encounter for screening for malignant neoplasm of prostate 12/4/2017   • Encounter for screening for other disorder 12/4/2017   • Need for prophylactic vaccination against Streptococcus pneumoniae  (pneumococcus) 10/19/2018   • Onychomycosis 10/19/2018   • Pain in right shoulder 10/3/2018   • TIA (transient ischemic attack) 01/05/2020     Family History   Problem Relation Age of Onset   • Cerebral aneurysm Father    • Peripheral vascular disease Brother    • Diabetes type II Brother    • Nephrotic syndrome Grandson    • Nephrotic syndrome Granddaughter      Social History     Tobacco Use   • Smoking status: Never Smoker   • Smokeless tobacco: Never Used   Substance Use Topics   • Alcohol use: Never   • Drug use: Never       Current Facility-Administered Medications:   •  acetaminophen (TYLENOL) tablet 650 mg, 650 mg, Oral, Q4H PRN, Swati Rivers MD  •  amLODIPine (NORVASC) tablet 10 mg, 10 mg, Oral, Daily, Swati Rivers MD, 10 mg at 06/05/21 1000  •  aspirin EC tablet 81 mg, 81 mg, Oral, Daily, Swati Rivers MD, 81 mg at 06/05/21 1000  •  atorvastatin (LIPITOR) tablet 10 mg, 10 mg, Oral, Daily, Swati Rivers MD, 10 mg at 06/05/21 1000  •  clopidogrel (PLAVIX) tablet 75 mg, 75 mg, Oral, Daily, Sawti Rivers MD, 75 mg at 06/05/21 1000  •  dextrose (D50W) 25 g/ 50mL Intravenous Solution 25 g, 25 g, Intravenous, Q15 Min PRN, Swati Rivers MD  •  dextrose (GLUTOSE) oral gel 15 g, 15 g, Oral, Q15 Min PRN, Swati Rivers MD  •  docusate sodium (COLACE) capsule 100 mg, 100 mg, Oral, Daily, Swati Rivers MD, 100 mg at 06/05/21 1000  •  escitalopram (LEXAPRO) tablet 10 mg, 10 mg, Oral, Daily, Swati Rivers MD, 10 mg at 06/05/21 1000  •  glucagon (human recombinant) (GLUCAGEN DIAGNOSTIC) injection 1 mg, 1 mg, Subcutaneous, Q15 Min PRN, Swati Rivers MD  •  heparin (porcine) 5000 UNIT/ML injection 5,000 Units, 5,000 Units, Subcutaneous, Q12H, Courtney Neri DO, 5,000 Units at 06/05/21 1208  •  hydrALAZINE (APRESOLINE) tablet 10 mg, 10 mg, Oral, Q6H PRN, Swati Rivers MD, 10 mg at 06/05/21 1207  •  insulin glargine (LANTUS, SEMGLEE) injection 10 Units, 10 Units, Subcutaneous, Nightly, Swati Rivers MD, 10 Units at 06/05/21 0026  •   insulin lispro (ADMELOG) injection 0-14 Units, 0-14 Units, Subcutaneous, TID AC, 3 Units at 06/05/21 1208 **AND** insulin lispro (ADMELOG) injection 0-14 Units, 0-14 Units, Subcutaneous, PRN, Swati Rivers MD  •  insulin lispro (ADMELOG) injection 5 Units, 5 Units, Subcutaneous, TID With Meals, Swati Rivers MD, 5 Units at 06/05/21 1208  •  levETIRAcetam (KEPPRA) tablet 500 mg, 500 mg, Oral, BID, Swati Rivers MD, 500 mg at 06/05/21 1000  •  levothyroxine (SYNTHROID, LEVOTHROID) tablet 25 mcg, 25 mcg, Oral, Q AM, Swati Rivers MD, 25 mcg at 06/05/21 0524  •  metoprolol tartrate (LOPRESSOR) tablet 50 mg, 50 mg, Oral, Q12H, Swati Rivers MD, 50 mg at 06/05/21 1000  •  ondansetron (ZOFRAN) injection 4 mg, 4 mg, Intravenous, Q6H PRN, Swati Rivers MD  •  sodium chloride 0.9 % flush 10 mL, 10 mL, Intravenous, Q12H, Swati Rivers MD, 10 mL at 06/05/21 1000  •  sodium chloride 0.9 % flush 10 mL, 10 mL, Intravenous, PRN, Swati Rivers MD  •  sodium chloride 0.9 % infusion, 100 mL/hr, Intravenous, Continuous, Swati Rivers MD, Last Rate: 100 mL/hr at 06/05/21 0445, 100 mL/hr at 06/05/21 0445      Objective     Vital Signs  Temp:  [97.6 °F (36.4 °C)-98.1 °F (36.7 °C)] 98 °F (36.7 °C)  Heart Rate:  [64-76] 68  Resp:  [16-20] 16  BP: (142-191)/() 157/86    I/O this shift:  In: 240 [P.O.:240]  Out: 150 [Urine:150]  I/O last 3 completed shifts:  In: 480 [P.O.:480]  Out: 200 [Urine:200]    Physical Exam  Vitals and nursing note reviewed.   Constitutional:       General: He is not in acute distress.     Appearance: Normal appearance. He is normal weight. He is not ill-appearing, toxic-appearing or diaphoretic.   HENT:      Head: Normocephalic and atraumatic.      Right Ear: External ear normal.      Left Ear: External ear normal.      Nose: Nose normal.      Mouth/Throat:      Mouth: Mucous membranes are dry.      Pharynx: Oropharynx is clear.   Eyes:      General: No scleral icterus.     Pupils: Pupils are equal, round, and reactive  to light.   Cardiovascular:      Rate and Rhythm: Normal rate and regular rhythm.      Pulses: Normal pulses.      Heart sounds: No murmur heard.     Pulmonary:      Effort: Pulmonary effort is normal.      Breath sounds: Normal breath sounds. No stridor. No wheezing or rhonchi.   Abdominal:      General: There is no distension.      Palpations: Abdomen is soft. There is no mass.      Tenderness: There is no right CVA tenderness or left CVA tenderness.   Musculoskeletal:      Cervical back: Neck supple. No rigidity.      Right lower leg: No edema.      Left lower leg: No edema.   Skin:     General: Skin is warm and dry.      Findings: No lesion or rash.   Neurological:      General: No focal deficit present.      Mental Status: He is alert and oriented to person, place, and time.         Results Review:    I reviewed the patient's new clinical results.    WBC WBC   Date Value Ref Range Status   06/05/2021 11.00 (H) 3.40 - 10.80 10*3/mm3 Final      HGB Hemoglobin   Date Value Ref Range Status   06/05/2021 11.6 (L) 13.0 - 17.7 g/dL Final      HCT Hematocrit   Date Value Ref Range Status   06/05/2021 33.1 (L) 37.5 - 51.0 % Final      Platlets No results found for: LABPLAT   MCV MCV   Date Value Ref Range Status   06/05/2021 79.1 79.0 - 97.0 fL Final          Sodium Sodium   Date Value Ref Range Status   06/05/2021 136 136 - 145 mmol/L Final      Potassium Potassium   Date Value Ref Range Status   06/05/2021 3.8 3.5 - 5.2 mmol/L Final      Chloride Chloride   Date Value Ref Range Status   06/05/2021 104 98 - 107 mmol/L Final      CO2 CO2   Date Value Ref Range Status   06/05/2021 21.0 (L) 22.0 - 29.0 mmol/L Final      BUN BUN   Date Value Ref Range Status   06/05/2021 27 (H) 8 - 23 mg/dL Final      Creatinine Creatinine   Date Value Ref Range Status   06/05/2021 2.33 (H) 0.76 - 1.27 mg/dL Final      Calcium Calcium   Date Value Ref Range Status   06/05/2021 8.0 (L) 8.6 - 10.5 mg/dL Final      PO4 No results found for:  CAPO4   Albumin No results found for: ALBUMIN   Magnesium No results found for: MG   Uric Acid No results found for: URICACID         Assessment/Plan       Acute kidney injury superimposed on chronic kidney disease (CMS/HCC)    Hypertensive emergency    Elevated troponin level      Assessment & Plan    1. tosha-likely due either to uncontrolled hypertension or prerenal azotemia; will adjust his regimen and try to find one he will take; continue ivf and avoid nephrotoxins  2.hypovolemia-due to inadequate po intake  3.benign htn with ckd3-uncontrolled  4. zyo8j-ca baseline  5. Hypocalcemia-nutritional  6. Seizure disorder-better bp control would likely help    I discussed the patients findings and my recommendations with patient    Be Franks MD  06/05/21  14:06 EDT    Time:

## 2021-06-05 NOTE — PLAN OF CARE
Problem: Adult Inpatient Plan of Care  Goal: Plan of Care Review  Outcome: Ongoing, Progressing  Flowsheets (Taken 6/5/2021 1118)  Plan of Care Reviewed With: patient  Outcome Summary: Pt direct admit from [facility] last night for hypertension, elevated troponin, and renal insufficiency. Cardiology and nephrology consulted. No distress noted. Will continue to monitor.   Goal Outcome Evaluation:  Plan of Care Reviewed With: patient     Outcome Summary: Pt direct admit from [facility] last night for hypertension, elevated troponin, and renal insufficiency. Cardiology and nephrology consulted. No distress noted. Will continue to monitor.

## 2021-06-05 NOTE — PROGRESS NOTES
[facility] Medicine Services Daily Progress Note      Hospitalist Team  LOS 1 days      Patient Care Team:  Marizol Puentes MD as PCP - General (Family Medicine)    Patient Location: 2119/1      Subjective   Subjective     Chief Complaint / Subjective  F/u elevated bp and troponin     Brief Synopsis of Hospital Course/HPI  Isaias Molina is a 65 y.o. male who presents with patient came to [facility] through the ED at [facility] in [place]. He reports that he was having uncontrolled blood pressure at home..      Patient has a history of coronary artery disease with previous PCI to the left circumflex and RCA in 2011.     The patient reports in the last month he has stopped many of his medications including antihypertensive agents, his statin and Plavix.     He reports that he was having periods of lightheadedness and dizziness and thought his blood pressure was running low so he stopped these medications.     He reports with Covid he has not been out to the doctor and missed several follow-ups.     He denies any exertional chest pain or dyspnea but reports yesterday he was feeling unwell which prompted his visit to the emergency room.     Yesterday he was unaware of having a seizure. However this morning he reports he thinks he had a seizure yesterday because his muscles feel sore and tight and he bit his tongue.     He denies any current chest pain  In the emergency room at [facility] it was reported that his troponin was 1.0       6/5- Patient denies any further chest pain. Reports he thinks he might have had a seizure yesterday due to muscle cramping today.           Review of Systems   Constitutional: Negative for chills.   Cardiovascular: Negative for chest pain.   Respiratory: Negative for cough and shortness of breath.    Gastrointestinal: Negative for abdominal pain, nausea and vomiting.         Objective   Objective      Vital Signs  Temp:  [97.6 °F  "(36.4 °C)-98.9 °F (37.2 °C)] 98.9 °F (37.2 °C)  Heart Rate:  [64-76] 76  Resp:  [16-20] 18  BP: (142-191)/() 173/82  Oxygen Therapy  SpO2: 97 %  Device (Oxygen Therapy): room air  Flowsheet Rows      First Filed Value   Admission Height  175.3 cm (69\") Documented at 06/04/2021 2120   Admission Weight  107 kg (234 lb 12.6 oz) Documented at 06/04/2021 2120        Intake & Output (last 3 days)       06/02 0701 - 06/03 0700 06/03 0701 - 06/04 0700 06/04 0701 - 06/05 0700 06/05 0701 - 06/06 0700    P.O.   480 240    Total Intake(mL/kg)   480 (4.5) 240 (2.3)    Urine (mL/kg/hr)   200 150 (0.2)    Total Output   200 150    Net   +280 +90                Lines, Drains & Airways    Active LDAs     Name:   Placement date:   Placement time:   Site:   Days:    Peripheral IV 06/04/21  Posterior;Right Wrist   06/04/21    -- unknown    Wrist   1    Peripheral IV 06/05/21 0514 Left;Posterior Hand   06/05/21 0514    Hand   less than 1                  Physical Exam:    Physical Exam  Vitals reviewed.   Constitutional:       General: He is not in acute distress.     Appearance: He is obese.   HENT:      Head: Normocephalic and atraumatic.      Mouth/Throat:      Mouth: Mucous membranes are moist.   Cardiovascular:      Rate and Rhythm: Normal rate and regular rhythm.      Heart sounds: No murmur heard.     Pulmonary:      Effort: Pulmonary effort is normal. No respiratory distress.      Breath sounds: No wheezing or rales.   Abdominal:      General: Bowel sounds are normal.      Palpations: Abdomen is soft.      Tenderness: There is no abdominal tenderness.   Musculoskeletal:      Right lower leg: No edema.      Left lower leg: No edema.   Skin:     General: Skin is warm and dry.   Neurological:      Mental Status: He is alert.   Psychiatric:         Mood and Affect: Mood normal.         Behavior: Behavior normal.               Procedures:              Results Review:     I reviewed the patient's new clinical " results.      Lab Results (last 24 hours)     Procedure Component Value Units Date/Time    Urinalysis With Microscopic If Indicated (No Culture) - Urine, Clean Catch [[id number]]  (Abnormal) Collected: 06/05/21 1359    Specimen: Urine, Clean Catch Updated: 06/05/21 1419     Color, UA Yellow     Appearance, UA Cloudy     Comment: Result checked         pH, UA <=5.0     Specific Gravity, UA 1.027     Glucose,  mg/dL (1+)     Ketones, UA Negative     Bilirubin, UA Negative     Blood, UA Moderate (2+)     Protein, UA >=300 mg/dL (3+)     Leuk Esterase, UA Negative     Nitrite, UA Negative     Urobilinogen, UA 0.2 E.U./dL    Urinalysis, Microscopic Only - Urine, Clean Catch [[id number]] Collected: 06/05/21 1359    Specimen: Urine, Clean Catch Updated: 06/05/21 1414    Creatinine, Urine, Random - Urine, Clean Catch [[id number]] Collected: 06/05/21 1359    Specimen: Urine, Clean Catch Updated: 06/05/21 1409    Sodium, Urine, Random - Urine, Clean Catch [[id number]] Collected: 06/05/21 1359    Specimen: Urine, Clean Catch Updated: 06/05/21 1407    POC Glucose Once [[id number]]  (Abnormal) Collected: 06/05/21 1141    Specimen: Blood Updated: 06/05/21 1142     Glucose 160 mg/dL      Comment: Serial Number: [id number]:  [id number]       Troponin [[id number]]  (Abnormal) Collected: 06/05/21 0847    Specimen: Blood Updated: 06/05/21 0914     Troponin T 0.366 ng/mL     Narrative:      Troponin T Reference Range:  <= 0.03 ng/mL-   Negative for AMI  >0.03 ng/mL-     Abnormal for myocardial necrosis.  Clinicians would have to utilize clinical acumen, EKG, Troponin and serial changes to determine if it is an Acute Myocardial Infarction or myocardial injury due to an underlying chronic condition.       Results may be falsely decreased if patient taking Biotin.      POC Glucose Once [[id number]]  (Abnormal) Collected: 06/05/21 0724    Specimen: Blood Updated: 06/05/21 0725     Glucose 142 mg/dL      Comment: Serial Number:  [id number]:  [id number]       Troponin [[id number]]  (Abnormal) Collected: 06/05/21 0001    Specimen: Blood Updated: 06/05/21 0051     Troponin T 0.248 ng/mL     Narrative:      Troponin T Reference Range:  <= 0.03 ng/mL-   Negative for AMI  >0.03 ng/mL-     Abnormal for myocardial necrosis.  Clinicians would have to utilize clinical acumen, EKG, Troponin and serial changes to determine if it is an Acute Myocardial Infarction or myocardial injury due to an underlying chronic condition.       Results may be falsely decreased if patient taking Biotin.      BNP [[id number]]  (Abnormal) Collected: 06/05/21 0001    Specimen: Blood Updated: 06/05/21 0040     proBNP 5,272.0 pg/mL     Narrative:      Among patients with dyspnea, NT-proBNP is highly sensitive for the detection of acute congestive heart failure. In addition NT-proBNP of <300 pg/ml effectively rules out acute congestive heart failure with 99% negative predictive value.    Results may be falsely decreased if patient taking Biotin.      T4, Free [[id number]]  (Normal) Collected: 06/05/21 0001    Specimen: Blood Updated: 06/05/21 0040     Free T4 0.98 ng/dL     Narrative:      Results may be falsely increased if patient taking Biotin.      Basic Metabolic Panel [[id number]]  (Abnormal) Collected: 06/05/21 0001    Specimen: Blood Updated: 06/05/21 0036     Glucose 219 mg/dL      BUN 27 mg/dL      Creatinine 2.33 mg/dL      Sodium 136 mmol/L      Potassium 3.8 mmol/L      Chloride 104 mmol/L      CO2 21.0 mmol/L      Calcium 8.0 mg/dL      eGFR Non African Amer 28 mL/min/1.73      BUN/Creatinine Ratio 11.6     Anion Gap 11.0 mmol/L     Narrative:      GFR Normal >60  Chronic Kidney Disease <60  Kidney Failure <15      Phosphorus [[id number]]  (Normal) Collected: 06/05/21 0001    Specimen: Blood Updated: 06/05/21 0036     Phosphorus 3.7 mg/dL     Lipid Panel [[id number]]  (Abnormal) Collected: 06/05/21 0001    Specimen: Blood Updated: 06/05/21 0036     Total  Cholesterol 166 mg/dL      Triglycerides 308 mg/dL      HDL Cholesterol 22 mg/dL      LDL Cholesterol  92 mg/dL      VLDL Cholesterol 52 mg/dL      LDL/HDL Ratio 3.75    Narrative:      Cholesterol Reference Ranges  (U.S. Department of Health and Human Services ATP III Classifications)    Desirable          <200 mg/dL  Borderline High    200-239 mg/dL  High Risk          >240 mg/dL      Triglyceride Reference Ranges  (U.S. Department of Health and Human Services ATP III Classifications)    Normal           <150 mg/dL  Borderline High  150-199 mg/dL  High             200-499 mg/dL  Very High        >500 mg/dL    HDL Reference Ranges  (U.S. Department of Health and Human Services ATP III Classifcations)    Low     <40 mg/dl (major risk factor for CHD)  High    >60 mg/dl ('negative' risk factor for CHD)        LDL Reference Ranges  (U.S. Department of Health and Human Services ATP III Classifcations)    Optimal          <100 mg/dL  Near Optimal     100-129 mg/dL  Borderline High  130-159 mg/dL  High             160-189 mg/dL  Very High        >189 mg/dL    PTH, Intact [[id number]]  (Abnormal) Collected: 06/05/21 0000    Specimen: Blood Updated: 06/05/21 0036     PTH, Intact 125.2 pg/mL     Narrative:      Results may be falsely decreased if patient taking Biotin.      CBC (No Diff) [[id number]]  (Abnormal) Collected: 06/05/21 0000    Specimen: Blood Updated: 06/05/21 0012     WBC 11.00 10*3/mm3      RBC 4.18 10*6/mm3      Hemoglobin 11.6 g/dL      Hematocrit 33.1 %      MCV 79.1 fL      MCH 27.8 pg      MCHC 35.1 g/dL      RDW 14.9 %      RDW-SD 42.0 fl      MPV 9.2 fL      Platelets 202 10*3/mm3     Hemoglobin A1c [[id number]] Collected: 06/05/21 0001    Specimen: Blood Updated: 06/05/21 0008    POC Glucose Once [[id number]]  (Abnormal) Collected: 06/04/21 2123    Specimen: Blood Updated: 06/04/21 2124     Glucose 146 mg/dL      Comment: Serial Number: [id number]:  [id number]           No results found for: HGBA1C             No results found for: LIPASE  Lab Results   Component Value Date    CHOL 166 06/05/2021    CHLPL 278 (H) 12/31/2019    TRIG 308 (H) 06/05/2021    HDL 22 (L) 06/05/2021    LDL 92 06/05/2021       No results found for: INTRAOP, PREDX, FINALDX, COMDX    Microbiology Results (last 10 days)     ** No results found for the last 240 hours. **          ECG/EMG Results (most recent)     None              Results for orders placed in visit on 09/24/19    SCANNED - ECHOCARDIOGRAM      No radiology results for the last 7 days        Xrays, labs reviewed personally by physician.    Medication Review:   I have reviewed the patient's current medication list      Scheduled Meds  amLODIPine, 10 mg, Oral, Daily  aspirin, 81 mg, Oral, Daily  atorvastatin, 10 mg, Oral, Daily  clopidogrel, 75 mg, Oral, Daily  docusate sodium, 100 mg, Oral, Daily  escitalopram, 10 mg, Oral, Daily  heparin (porcine), 5,000 Units, Subcutaneous, Q12H  insulin glargine, 10 Units, Subcutaneous, Nightly  insulin lispro, 0-14 Units, Subcutaneous, TID AC  insulin lispro, 5 Units, Subcutaneous, TID With Meals  levETIRAcetam, 500 mg, Oral, BID  levothyroxine, 25 mcg, Oral, Q AM  metoprolol succinate XL, 50 mg, Oral, Q24H  sodium chloride, 10 mL, Intravenous, Q12H        Meds Infusions  sodium chloride, 100 mL/hr, Last Rate: 100 mL/hr (06/05/21 0445)        Meds PRN  •  acetaminophen  •  dextrose  •  dextrose  •  glucagon (human recombinant)  •  hydrALAZINE  •  insulin lispro **AND** insulin lispro  •  ondansetron  •  sodium chloride        Assessment/Plan   Assessment/Plan     Active Hospital Problems    Diagnosis  POA   • **Acute kidney injury superimposed on chronic kidney disease (CMS/HCC) [N17.9, N18.9]  Yes   • Hypertensive emergency [I16.1]  Yes   • Elevated troponin level [R77.8]  Yes      Resolved Hospital Problems   No resolved problems to display.       MEDICAL DECISION MAKING COMPLEXITY BY PROBLEM:     Hypertensive emergency  - patient reports  noncompliance with home meds due to reported hypotension   - continue amlodipine 10 mg, metoprolol 50 mg bid  - IV hydralazine prn  - holding benazepril and hctz given tosha      Acute Kidney Injury on stage III CKD  - baseline Cr 1.3-1.6  - nephrology consulted given plan for cardiac cath on Monday   - continue IV fluids   - Hold ACE inhibitors and diuretics  -  Avoid nephrotoxic medications    Elevated troponin level-likely 2/2 #1 and 2  - discussed with cardiology and plan on cardiac cath on Monday      Leukocytosis-likely reactive     Coronary artery disease s/p 2 coronary stents  - Continue aspirin and Plavix, pt reports he has not been taking daily      Uncontrolled IDDM with retinopathy 2/2 noncompliance with medications.  - Start patient on basal, prandial, and correctional insulins.  - Hold Metformin.  - Hgb a1c- pending      Hyperlipidemia  - resume statin  - HDL 22, LDL 92, Triglycerides 308      Hypothyroidism  - pt states he stopped taking thyroid hormone.  - Free T4- 0.98  - started on synthroid 25mcg     Hx of CVA with no residual deficits     Seizure disorder  - continue Keppra  - needs to follow up with neurology outpatient  - pt reports he may have had a seizure yesterday      Depression  - stopped taking medications.     BPH  - on Flomax    DVT Prophylaxis  - heparin         VTE Prophylaxis -   Mechanical Order History:     None      Pharmalogical Order History:      Ordered     Dose Route Frequency Stop    06/05/21 0027  heparin (porcine) 5000 UNIT/ML injection 5,000 Units  Status:  Discontinued      5,000 Units SC Every 8 Hours Scheduled 06/05/21 0944    06/05/21 0944  heparin (porcine) 5000 UNIT/ML injection 5,000 Units      5,000 Units SC Every 12 Hours Scheduled --    06/04/21 2311  heparin (porcine) 5000 UNIT/ML injection 5,000 Units  Status:  Discontinued      5,000 Units SC Every 8 Hours Scheduled 06/05/21 0027                  Code Status -   Code Status and Medical Interventions:   Ordered  at: 06/04/21 2311     Code Status:    CPR     Medical Interventions (Level of Support Prior to Arrest):    Full             Discharge Planning  Pending progress         Electronically signed by Courtney Neri DO, 06/05/21, 14:19 EDT.  [facility] ([facility] Hospitalist Team

## 2021-06-05 NOTE — H&P
"      [facility] Medicine Services      Patient Name: Isaias Molina  : 1955  MRN: [id number]  Primary Care Physician: Marizol Puentes MD  Date of admission: 2021    Patient Care Team:  Marizol Puentes MD as PCP - General (Family Medicine)          Subjective   History Present Illness     Chief Complaint:  \"Feeling bad and hypertensive\".    History of Present Illness  65-year-old  male with Hx of CAD-s/p 2 coronary stents and on aspirin and Plavix, HTN, HLP, CVA, uncontrolled IDDM with retinopathy, stage III CKD, depression, hypothyroidism, BPH, and seizure disorder, and is not compliant with medications.  He presented to the ED of [facility] today stating that he is not feeling well since the morning and his BP at home was 220.  He denied having chest pain, SOA, palpitation, headache, abdominal pain, nausea or vomiting or any other complaint.  He states that he did not take his blood pressure medications today and he stopped any of his medications on his own.    Emergency department course:  Afebrile, initial BP was 227/108, tachycardic, no acute distress.  Physical examination per ED physician was unremarkable.  Labs were significant for first troponin of 0.22 then it went up to 1.03, glucose 291, BUN 25, creatinine 2.07, GFR 33, WBC count 14.0.  Urine analysis showed proteinuria and glycosuria but negative for infection.  Head CT scan without contrast did not show any acute process.  EKG showed sinus tachycardia with no acute ST changes.  Patient blood pressure partially improved after placing nitroglycerin ointment and giving him 0.2 mg of clonidine.  Because of the hypotension and elevated troponin level ED physician requested to transfer to our hospital and it was accepted.    ROS  Please refer to HPI. All other systems were reviewed and were negative.     Personal History     Past Medical History:   Past Medical History:   Diagnosis Date   • " Cellulitis 10/15/2013   • Encounter for screening for malignant neoplasm of prostate 12/4/2017   • Encounter for screening for other disorder 12/4/2017   • Need for prophylactic vaccination against Streptococcus pneumoniae (pneumococcus) 10/19/2018   • Onychomycosis 10/19/2018   • Pain in right shoulder 10/3/2018   • TIA (transient ischemic attack) 01/05/2020       Surgical History:      Past Surgical History:   Procedure Laterality Date   • CATARACT EXTRACTION WITH INTRAOCULAR LENS IMPLANT Bilateral    • CORONARY ANGIOPLASTY WITH STENT PLACEMENT  2011    PTCA - LCx & RCA with stent   • ROTATOR CUFF REPAIR Right    • TOTAL SHOULDER REVERSE ARTHROPLASTY Right     after fracture 2018           Family History: family history includes Cerebral aneurysm in his father; Diabetes type II in his brother; Nephrotic syndrome in his granddaughter and grandson; Peripheral vascular disease in his brother. Otherwise pertinent FHx was reviewed and unremarkable.     Social History:  reports that he has never smoked. He has never used smokeless tobacco. He reports that he does not drink alcohol and does not use drugs.      Medications:  Prior to Admission medications    Medication Sig Start Date End Date Taking? Authorizing Provider   amLODIPine (NORVASC) 5 MG tablet Take 1 tablet by mouth Daily. 4/29/20   Marizol Puentes MD   aspirin 81 MG EC tablet Take 81 mg by mouth Daily.    ProviderHermelindo MD   benazepril (LOTENSIN) 40 MG tablet Take 1 tablet by mouth Daily. 4/29/20   Marizol Puentes MD   clopidogrel (PLAVIX) 75 MG tablet Take 1 tablet by mouth Daily. 2/3/20   Marizol Puentes MD   Cyanocobalamin (B-12 COMPLIANCE INJECTION) 1000 MCG/ML kit B-12 COMPLIANCE INJECTION 1000 MCG/ML KIT 12/4/17   ProviderHermelindo MD   docusate sodium (COLACE) 100 MG capsule Take 1 capsule by mouth Daily. 12/30/19   Marizol Puentes MD   escitalopram (LEXAPRO) 10 MG tablet TAKE ONE TABLET BY MOUTH EVERY DAY 9/9/20   " Marizol Puentes MD   glucose blood (ACCU-CHEK ROLANDO) test strip 1 each by Other route Daily. Use as instructed 1/20/20   Marizol Puentes MD   hydroCHLOROthiazide (HYDRODIURIL) 25 MG tablet TAKE ONE TABLET BY MOUTH EVERY DAY 12/10/20   Marizol Puentes MD   Insulin Syringe-Needle U-100 (ULTICARE INSULIN SYRINGE) 31G X 5/16\" 0.5 ML misc ULTICARE INSULIN SYRINGE 31G X 5/16\" 0.5 ML 12/13/18   Provider, MD Hermelindo   ketoconazole (NIZORAL) 2 % cream KETOCONAZOLE 2 % CREA 10/19/18   ProviderHermelindo MD   Lancets (ACCU-CHEK SOFT TOUCH) lancets Use with device and strips to check blood glucose 1/20/20   Marizol Puentes MD   levETIRAcetam (Keppra) 500 MG tablet Take 1 tablet by mouth 2 (Two) Times a Day. 4/29/20   Marizol Puentes MD   levothyroxine (SYNTHROID) 25 MCG tablet Take 1 tablet by mouth Daily. 1/20/20   Marizol Puentes MD   metFORMIN (GLUCOPHAGE) 500 MG tablet TAKE ONE TABLET BY MOUTH TWICE DAILY with meals 8/9/19   Marizol Puentes MD   NOVOLIN 70/30 (70-30) 100 UNIT/ML injection inject 50 units SUBCUTANEOUSLY EVERY MORNING AND 40 units SUBCUTANEOUSLY EVERY EVENING 8/17/20   Marizol Puentes MD   pravastatin (PRAVACHOL) 40 MG tablet Take 1 tablet by mouth Daily. 1/20/20   Marizol Puentes MD   tamsulosin (FLOMAX) 0.4 MG capsule 24 hr capsule TAKE ONE CAPSULE BY MOUTH TWICE DAILY 4/23/21   Marizol Puentes MD       Allergies:    Allergies   Allergen Reactions   • Shellfish Allergy Nausea And Vomiting       Objective   Objective     Vital Signs  Temp:  [97.6 °F (36.4 °C)-97.8 °F (36.6 °C)] 97.8 °F (36.6 °C)  Heart Rate:  [67-76] 71  Resp:  [18-20] 18  BP: (169-191)/() 169/85  SpO2:  [95 %-98 %] 97 %  on   ;   Device (Oxygen Therapy): room air  Body mass index is 34.67 kg/m².    Physical Exam  General: Obese, unkempt, alert and oriented x3, no acute distress.   Eyes:  Show anicteric sclerae, moist conjunctivae with no lig lag; " PERRLA.  HENT:  Normocephalic, atraumatic, moist oral mucosa.  Neck: Short and thick, no bruit, no JVP, no thyroid or lymph node enlargement, trachea central,   Lungs:  Good air entry. Clear to auscultation.   Heart: RRR, no murmur or rub.   Abdomen:  Soft, not tender, not distended, no organomegaly, bowel sounds positive.   Extremities: No leg edema or joint swelling, no calf tenderness, normal range of movement, pedal pulses intact.   Skin: No rash, lesions, or ulcers.  Normal texture and turgor.  Neurology:  Grossly intact.   Psychiatric exam: Pleasant, cooperative, appropriate mood and affect, intact judgment and insight.    Results Review:  I have personally reviewed most recent cardiac tracings, lab results and radiology images and interpretations and agree with findings, most notably: As below.              Invalid input(s):  ALKPHOS  CrCl cannot be calculated (Patient's most recent lab result is older than the maximum 30 days allowed.).  Brief Urine Lab Results     None          Microbiology Results (last 10 days)     ** No results found for the last 240 hours. **          ECG/EMG Results (most recent)     None              Results for orders placed in visit on 09/24/19    SCANNED - ECHOCARDIOGRAM      No radiology results for the last 7 days      CrCl cannot be calculated (Patient's most recent lab result is older than the maximum 30 days allowed.).    Assessment/Plan   Assessment/Plan       Assessment/plan:  1.  Hypertensive emergency.  -Increase the dose of amlodipine to 10 mg daily, add metoprolol 50 mg twice daily, IV hydralazine every 6 hours as needed.  -Low-salt diet.    2.  DYLON on stage III CKD.  -Consult nephrology.  -Gentle hydration with normal saline.  -Hold ACE inhibitors and diuretics.  Avoid nephrotoxic medications.  Monitor renal function.  -Check serum phosphorus and PTH levels.    3.  Elevated troponin level-likely 2/2 #1 and 2.  -Consult cardiology.  -Trend troponin level.    4.   Leukocytosis-likely reactive.    5.  Coronary artery disease-s/p 2 coronary stents.  -Continue aspirin and Plavix.    6.  Uncontrolled IDDM with retinopathy 2/2 noncompliance with medications.  -Start patient on basal, prandial, and correctional insulins.  -Hold Metformin.  -Check HbA1c level.  -Diabetic diet.    7.  Hyperlipidemia-on statin.  -Check fasting lipid panel.    8.  Hypothyroidism-stopped taking thyroid hormone.  -Check free T4 level and based on the result will order levothyroxine if needed.    9.  Hx of CVA with no residual deficits.    10.  Seizure disorder-on Keppra.    11.  Depression-stopped taking medications.    12.  BPH-on Flomax.          VTE Prophylaxis -   Mechanical Order History:     None      Pharmalogical Order History:      Ordered     Dose Route Frequency Stop    06/04/21 8731  heparin (porcine) 5000 UNIT/ML injection 5,000 Units      5,000 Units SC Every 8 Hours Scheduled --                CODE STATUS:    Code Status and Medical Interventions:   Ordered at: 06/04/21 2351     Code Status:    CPR     Medical Interventions (Level of Support Prior to Arrest):    Full       This patient has been examined wearing appropriate Personal Protective Equipment and discussed with hospital infection control department. 06/04/21      I discussed the patient's findings and my recommendations with patient.      Signature:Electronically signed by Swati Rivers MD, 06/04/21, 11:27 PM EDT.      [facility] Hospitalist Team

## 2021-06-06 LAB
ALBUMIN SERPL-MCNC: 2.8 G/DL (ref 3.5–5.2)
ALBUMIN/GLOB SERPL: 0.9 G/DL
ALP SERPL-CCNC: 102 U/L (ref 39–117)
ALT SERPL W P-5'-P-CCNC: 7 U/L (ref 1–41)
ANION GAP SERPL CALCULATED.3IONS-SCNC: 11 MMOL/L (ref 5–15)
AST SERPL-CCNC: 13 U/L (ref 1–40)
BILIRUB SERPL-MCNC: 0.5 MG/DL (ref 0–1.2)
BUN SERPL-MCNC: 24 MG/DL (ref 8–23)
BUN/CREAT SERPL: 13 (ref 7–25)
CALCIUM SPEC-SCNC: 7.8 MG/DL (ref 8.6–10.5)
CHLORIDE SERPL-SCNC: 107 MMOL/L (ref 98–107)
CO2 SERPL-SCNC: 20 MMOL/L (ref 22–29)
CREAT SERPL-MCNC: 1.85 MG/DL (ref 0.76–1.27)
DEPRECATED RDW RBC AUTO: 42 FL (ref 37–54)
ERYTHROCYTE [DISTWIDTH] IN BLOOD BY AUTOMATED COUNT: 14.9 % (ref 12.3–15.4)
GFR SERPL CREATININE-BSD FRML MDRD: 37 ML/MIN/1.73
GLOBULIN UR ELPH-MCNC: 3 GM/DL
GLUCOSE BLDC GLUCOMTR-MCNC: 118 MG/DL (ref 70–105)
GLUCOSE BLDC GLUCOMTR-MCNC: 150 MG/DL (ref 70–105)
GLUCOSE BLDC GLUCOMTR-MCNC: 154 MG/DL (ref 70–105)
GLUCOSE BLDC GLUCOMTR-MCNC: 169 MG/DL (ref 70–105)
GLUCOSE SERPL-MCNC: 144 MG/DL (ref 65–99)
HBA1C MFR BLD: 6.4 % (ref 3.5–5.6)
HCT VFR BLD AUTO: 34.1 % (ref 37.5–51)
HGB BLD-MCNC: 12 G/DL (ref 13–17.7)
MCH RBC QN AUTO: 28.1 PG (ref 26.6–33)
MCHC RBC AUTO-ENTMCNC: 35.2 G/DL (ref 31.5–35.7)
MCV RBC AUTO: 80 FL (ref 79–97)
PLATELET # BLD AUTO: 179 10*3/MM3 (ref 140–450)
PMV BLD AUTO: 9.5 FL (ref 6–12)
POTASSIUM SERPL-SCNC: 4 MMOL/L (ref 3.5–5.2)
PROT SERPL-MCNC: 5.8 G/DL (ref 6–8.5)
RBC # BLD AUTO: 4.26 10*6/MM3 (ref 4.14–5.8)
SODIUM SERPL-SCNC: 138 MMOL/L (ref 136–145)
TSH SERPL DL<=0.05 MIU/L-ACNC: 3.74 UIU/ML (ref 0.27–4.2)
WBC # BLD AUTO: 10 10*3/MM3 (ref 3.4–10.8)

## 2021-06-06 PROCEDURE — 25010000002 HYDRALAZINE PER 20 MG: Performed by: INTERNAL MEDICINE

## 2021-06-06 PROCEDURE — 80053 COMPREHEN METABOLIC PANEL: CPT | Performed by: INTERNAL MEDICINE

## 2021-06-06 PROCEDURE — 63710000001 INSULIN LISPRO (HUMAN) PER 5 UNITS: Performed by: INTERNAL MEDICINE

## 2021-06-06 PROCEDURE — 84443 ASSAY THYROID STIM HORMONE: CPT | Performed by: INTERNAL MEDICINE

## 2021-06-06 PROCEDURE — 25010000002 HEPARIN (PORCINE) PER 1000 UNITS: Performed by: INTERNAL MEDICINE

## 2021-06-06 PROCEDURE — 99232 SBSQ HOSP IP/OBS MODERATE 35: CPT | Performed by: INTERNAL MEDICINE

## 2021-06-06 PROCEDURE — 85027 COMPLETE CBC AUTOMATED: CPT | Performed by: INTERNAL MEDICINE

## 2021-06-06 PROCEDURE — 82962 GLUCOSE BLOOD TEST: CPT

## 2021-06-06 RX ORDER — HYDRALAZINE HYDROCHLORIDE 10 MG/1
10 TABLET, FILM COATED ORAL EVERY 8 HOURS SCHEDULED
Status: DISCONTINUED | OUTPATIENT
Start: 2021-06-06 | End: 2021-06-06

## 2021-06-06 RX ORDER — HYDRALAZINE HYDROCHLORIDE 25 MG/1
50 TABLET, FILM COATED ORAL 2 TIMES DAILY
Status: DISCONTINUED | OUTPATIENT
Start: 2021-06-07 | End: 2021-06-13 | Stop reason: HOSPADM

## 2021-06-06 RX ORDER — ATORVASTATIN CALCIUM 40 MG/1
40 TABLET, FILM COATED ORAL DAILY
Status: DISCONTINUED | OUTPATIENT
Start: 2021-06-07 | End: 2021-06-13 | Stop reason: HOSPADM

## 2021-06-06 RX ADMIN — HYDRALAZINE HYDROCHLORIDE 10 MG: 10 TABLET, FILM COATED ORAL at 13:31

## 2021-06-06 RX ADMIN — CLOPIDOGREL BISULFATE 75 MG: 75 TABLET ORAL at 08:48

## 2021-06-06 RX ADMIN — HYDRALAZINE HYDROCHLORIDE 10 MG: 10 TABLET, FILM COATED ORAL at 20:30

## 2021-06-06 RX ADMIN — Medication 10 ML: at 08:49

## 2021-06-06 RX ADMIN — SODIUM CHLORIDE 100 ML/HR: 9 INJECTION, SOLUTION INTRAVENOUS at 11:26

## 2021-06-06 RX ADMIN — INSULIN LISPRO 3 UNITS: 100 INJECTION, SOLUTION INTRAVENOUS; SUBCUTANEOUS at 08:47

## 2021-06-06 RX ADMIN — HEPARIN SODIUM 5000 UNITS: 5000 INJECTION INTRAVENOUS; SUBCUTANEOUS at 20:31

## 2021-06-06 RX ADMIN — INSULIN LISPRO 5 UNITS: 100 INJECTION, SOLUTION INTRAVENOUS; SUBCUTANEOUS at 08:49

## 2021-06-06 RX ADMIN — Medication 10 ML: at 20:31

## 2021-06-06 RX ADMIN — SODIUM CHLORIDE 100 ML/HR: 9 INJECTION, SOLUTION INTRAVENOUS at 00:30

## 2021-06-06 RX ADMIN — DOCUSATE SODIUM 100 MG: 100 CAPSULE, LIQUID FILLED ORAL at 08:48

## 2021-06-06 RX ADMIN — ASPIRIN 81 MG: 81 TABLET, COATED ORAL at 08:48

## 2021-06-06 RX ADMIN — AMLODIPINE BESYLATE 10 MG: 5 TABLET ORAL at 08:48

## 2021-06-06 RX ADMIN — INSULIN LISPRO 3 UNITS: 100 INJECTION, SOLUTION INTRAVENOUS; SUBCUTANEOUS at 11:26

## 2021-06-06 RX ADMIN — LEVETIRACETAM 500 MG: 500 TABLET ORAL at 20:30

## 2021-06-06 RX ADMIN — HYDRALAZINE HYDROCHLORIDE 10 MG: 20 INJECTION INTRAMUSCULAR; INTRAVENOUS at 23:15

## 2021-06-06 RX ADMIN — INSULIN LISPRO 5 UNITS: 100 INJECTION, SOLUTION INTRAVENOUS; SUBCUTANEOUS at 11:26

## 2021-06-06 RX ADMIN — HEPARIN SODIUM 5000 UNITS: 5000 INJECTION INTRAVENOUS; SUBCUTANEOUS at 08:47

## 2021-06-06 RX ADMIN — ESCITALOPRAM OXALATE 10 MG: 10 TABLET ORAL at 08:49

## 2021-06-06 RX ADMIN — INSULIN LISPRO 5 UNITS: 100 INJECTION, SOLUTION INTRAVENOUS; SUBCUTANEOUS at 16:52

## 2021-06-06 RX ADMIN — LEVETIRACETAM 500 MG: 500 TABLET ORAL at 08:48

## 2021-06-06 RX ADMIN — METOPROLOL SUCCINATE 50 MG: 50 TABLET, EXTENDED RELEASE ORAL at 08:48

## 2021-06-06 RX ADMIN — LEVOTHYROXINE SODIUM 25 MCG: 0.03 TABLET ORAL at 07:01

## 2021-06-06 RX ADMIN — TAMSULOSIN HYDROCHLORIDE 0.4 MG: 0.4 CAPSULE ORAL at 20:30

## 2021-06-06 RX ADMIN — ATORVASTATIN CALCIUM 10 MG: 10 TABLET, FILM COATED ORAL at 08:48

## 2021-06-06 NOTE — PLAN OF CARE
Goal Outcome Evaluation:      Afebrile.Blood pressure labile. Hydralazine 10 mg IVP for systolic > 160. Metoprolol XR started yesterday, along with Flomax. IVF: NS infusing at 100cc/hr. Renal Ultrasound completed. Monitor shows SR with occasional PVC's. Bed alarm on. Will continue to monitor closely.

## 2021-06-06 NOTE — PROGRESS NOTES
[facility] Medicine Services Daily Progress Note      Hospitalist Team  LOS 2 days      Patient Care Team:  Marizol Puentes MD as PCP - General (Family Medicine)    Patient Location: 2119/1      Subjective   Subjective     Chief Complaint / Subjective  F/u elevated bp and troponin     Brief Synopsis of Hospital Course/HPI  Isaias Molina is a 65 y.o. male who presents with patient [facility] through the ED at [facility] in [place]. He reports that he was having uncontrolled blood pressure at home..      Patient has a history of coronary artery disease with previous PCI to the left circumflex and RCA in 2011.     The patient reports in the last month he has stopped many of his medications including antihypertensive agents, his statin and Plavix.     He reports that he was having periods of lightheadedness and dizziness and thought his blood pressure was running low so he stopped these medications.     He reports with Covid he has not been out to the doctor and missed several follow-ups.     He denies any exertional chest pain or dyspnea but reports yesterday he was feeling unwell which prompted his visit to the emergency room.     Yesterday he was unaware of having a seizure. However this morning he reports he thinks he had a seizure yesterday because his muscles feel sore and tight and he bit his tongue.     He denies any current chest pain  In the emergency room at [facility] it was reported that his troponin was 1.0       6/5- Patient denies any further chest pain. Reports he thinks he might have had a seizure yesterday due to muscle cramping today.     6/6- Patient denies any complaint.      Review of Systems   Constitutional: Negative for chills.   Cardiovascular: Negative for chest pain.   Respiratory: Negative for cough and shortness of breath.    Gastrointestinal: Negative for abdominal pain, nausea and vomiting.         Objective   Objective   "    Vital Signs  Temp:  [97.8 °F (36.6 °C)-98.2 °F (36.8 °C)] 97.9 °F (36.6 °C)  Heart Rate:  [70-76] 70  Resp:  [16-18] 16  BP: (149-189)/(73-97) 149/73  Oxygen Therapy  SpO2: 97 %  Device (Oxygen Therapy): room air  Flowsheet Rows      First Filed Value   Admission Height  175.3 cm (69\") Documented at 06/04/2021 2120   Admission Weight  107 kg (234 lb 12.6 oz) Documented at 06/04/2021 2120        Intake & Output (last 3 days)       06/03 0701 - 06/04 0700 06/04 0701 - 06/05 0700 06/05 0701 - 06/06 0700 06/06 0701 - 06/07 0700    P.O.  480 720     Total Intake(mL/kg)  480 (4.5) 720 (6.7)     Urine (mL/kg/hr)  200 1725 (0.7) 500 (0.6)    Total Output  200 1725 500    Net  +280 -1005 -500                Lines, Drains & Airways    Active LDAs     Name:   Placement date:   Placement time:   Site:   Days:    Peripheral IV 06/04/21  Posterior;Right Wrist   06/04/21    -- unknown    Wrist   1    Peripheral IV 06/05/21 0514 Left;Posterior Hand   06/05/21 0514    Hand   less than 1                  Physical Exam:    Physical Exam  Vitals reviewed.   Constitutional:       General: He is not in acute distress.     Appearance: He is obese.   HENT:      Head: Normocephalic and atraumatic.      Mouth/Throat:      Mouth: Mucous membranes are moist.   Cardiovascular:      Rate and Rhythm: Normal rate and regular rhythm.      Heart sounds: No murmur heard.     Pulmonary:      Effort: Pulmonary effort is normal. No respiratory distress.      Breath sounds: No wheezing or rales.   Abdominal:      General: Bowel sounds are normal.      Palpations: Abdomen is soft.      Tenderness: There is no abdominal tenderness.   Musculoskeletal:      Right lower leg: No edema.      Left lower leg: No edema.   Skin:     General: Skin is warm and dry.      Comments: Abrasions to left foot   Neurological:      Mental Status: He is alert.   Psychiatric:         Mood and Affect: Mood normal.         Behavior: Behavior normal. "               Procedures:              Results Review:     I reviewed the patient's new clinical results.      Lab Results (last 24 hours)     Procedure Component Value Units Date/Time    POC Glucose Once [[id number]]  (Abnormal) Collected: 06/06/21 1112    Specimen: Blood Updated: 06/06/21 1113     Glucose 169 mg/dL      Comment: Serial Number: [id number]:  [id number]       POC Glucose Once [[id number]]  (Abnormal) Collected: 06/06/21 0725    Specimen: Blood Updated: 06/06/21 0726     Glucose 150 mg/dL      Comment: Serial Number: [id number]:  [id number]       TSH [[id number]]  (Normal) Collected: 06/06/21 0511    Specimen: Blood Updated: 06/06/21 0610     TSH 3.740 uIU/mL     Comprehensive Metabolic Panel [[id number]]  (Abnormal) Collected: 06/06/21 0511    Specimen: Blood Updated: 06/06/21 0608     Glucose 144 mg/dL      BUN 24 mg/dL      Creatinine 1.85 mg/dL      Sodium 138 mmol/L      Potassium 4.0 mmol/L      Chloride 107 mmol/L      CO2 20.0 mmol/L      Calcium 7.8 mg/dL      Total Protein 5.8 g/dL      Albumin 2.80 g/dL      ALT (SGPT) 7 U/L      AST (SGOT) 13 U/L      Alkaline Phosphatase 102 U/L      Total Bilirubin 0.5 mg/dL      eGFR Non African Amer 37 mL/min/1.73      Globulin 3.0 gm/dL      A/G Ratio 0.9 g/dL      BUN/Creatinine Ratio 13.0     Anion Gap 11.0 mmol/L     Narrative:      GFR Normal >60  Chronic Kidney Disease <60  Kidney Failure <15      CBC (No Diff) [[id number]]  (Abnormal) Collected: 06/06/21 0511    Specimen: Blood Updated: 06/06/21 0543     WBC 10.00 10*3/mm3      RBC 4.26 10*6/mm3      Hemoglobin 12.0 g/dL      Hematocrit 34.1 %      MCV 80.0 fL      MCH 28.1 pg      MCHC 35.2 g/dL      RDW 14.9 %      RDW-SD 42.0 fl      MPV 9.5 fL      Platelets 179 10*3/mm3     POC Glucose Once [[id number]]  (Abnormal) Collected: 06/05/21 2028    Specimen: Blood Updated: 06/05/21 2031     Glucose 181 mg/dL      Comment: Serial Number: [id number]:  [id number]           No results  found for: HGBA1C            No results found for: LIPASE  Lab Results   Component Value Date    CHOL 166 06/05/2021    CHLPL 278 (H) 12/31/2019    TRIG 308 (H) 06/05/2021    HDL 22 (L) 06/05/2021    LDL 92 06/05/2021       No results found for: INTRAOP, PREDX, FINALDX, COMDX    Microbiology Results (last 10 days)     ** No results found for the last 240 hours. **          ECG/EMG Results (most recent)     None              Results for orders placed in visit on 09/24/19    SCANNED - ECHOCARDIOGRAM      US Renal Bilateral    Result Date: 6/5/2021  1. No evidence of acute urinary obstruction. 2. Enlarged prostate. Electronically signed by:  Jesu Spicer M.D.  6/5/2021 6:34 PM          Xrays, labs reviewed personally by physician.    Medication Review:   I have reviewed the patient's current medication list      Scheduled Meds  amLODIPine, 10 mg, Oral, Daily  aspirin, 81 mg, Oral, Daily  atorvastatin, 10 mg, Oral, Daily  clopidogrel, 75 mg, Oral, Daily  docusate sodium, 100 mg, Oral, Daily  escitalopram, 10 mg, Oral, Daily  heparin (porcine), 5,000 Units, Subcutaneous, Q12H  hydrALAZINE, 10 mg, Oral, Q8H  insulin glargine, 10 Units, Subcutaneous, Nightly  insulin lispro, 0-14 Units, Subcutaneous, TID AC  insulin lispro, 5 Units, Subcutaneous, TID With Meals  levETIRAcetam, 500 mg, Oral, BID  levothyroxine, 25 mcg, Oral, Q AM  metoprolol succinate XL, 50 mg, Oral, Q24H  sodium chloride, 10 mL, Intravenous, Q12H  tamsulosin, 0.4 mg, Oral, Nightly        Meds Infusions       Meds PRN  •  acetaminophen  •  dextrose  •  dextrose  •  glucagon (human recombinant)  •  hydrALAZINE  •  insulin lispro **AND** insulin lispro  •  ondansetron  •  sodium chloride        Assessment/Plan   Assessment/Plan     Active Hospital Problems    Diagnosis  POA   • **Acute kidney injury superimposed on chronic kidney disease (CMS/HCC) [N17.9, N18.9]  Yes   • Hypertensive emergency [I16.1]  Yes   • Elevated troponin level [R77.8]  Yes       Resolved Hospital Problems   No resolved problems to display.       MEDICAL DECISION MAKING COMPLEXITY BY PROBLEM:     Hypertensive emergency  - patient reports noncompliance with home meds due to reported hypotension   - continue amlodipine 10 mg, metoprolol 50 mg bid  - IV hydralazine prn  - holding benazepril and hctz given tosha      Acute Kidney Injury on CKD stage IIIb  - baseline Cr 1.3-1.6  - nephrology consulted given plan for cardiac cath on Monday   - Cr trending down, dc IV fluids   - Hold ACE inhibitors and diuretics  - Avoid nephrotoxic medications    Elevated troponin level-likely 2/2 #1 and 2  - discussed with cardiology and possible plan for cardiac cath on Monday      Leukocytosis-likely reactive     Coronary artery disease s/p 2 coronary stents  - Continue aspirin and Plavix, pt reports he has not been taking daily      Uncontrolled IDDM with retinopathy 2/2 noncompliance with medications.  - Start patient on basal, prandial, and correctional insulins.  - Hold Metformin  - Hgb a1c- pending      Hyperlipidemia  - resume statin  - HDL 22, LDL 92, Triglycerides 308      Hypothyroidism  - pt states he stopped taking thyroid hormone.  - TSH 3.74 Free T4- 0.98     Hx of CVA with no residual deficits     Seizure disorder  - continue Keppra  - needs to follow up with neurology outpatient  - pt reports he may have had a seizure prior to admission, no seizure activity since admission      Depression  - stopped taking home medications     BPH  - on Flomax    DVT Prophylaxis  - heparin         VTE Prophylaxis -   Mechanical Order History:     None      Pharmalogical Order History:      Ordered     Dose Route Frequency Stop    06/05/21 0027  heparin (porcine) 5000 UNIT/ML injection 5,000 Units  Status:  Discontinued      5,000 Units SC Every 8 Hours Scheduled 06/05/21 0944    06/05/21 0944  heparin (porcine) 5000 UNIT/ML injection 5,000 Units      5,000 Units SC Every 12 Hours Scheduled --    06/04/21 1010   heparin (porcine) 5000 UNIT/ML injection 5,000 Units  Status:  Discontinued      5,000 Units SC Every 8 Hours Scheduled 06/05/21 0027                  Code Status -   Code Status and Medical Interventions:   Ordered at: 06/04/21 2311     Code Status:    CPR     Medical Interventions (Level of Support Prior to Arrest):    Full             Discharge Planning  Pending progress         Electronically signed by Courtney Neri DO, 06/06/21, 14:20 EDT.  [facility] Hospitalist Team

## 2021-06-06 NOTE — PLAN OF CARE
Problem: Adult Inpatient Plan of Care  Goal: Plan of Care Review  Outcome: Ongoing, Progressing  Flowsheets  Taken 6/6/2021 1009  Outcome Summary: Medications adjusted yesterday per nephrology. Urine output increased. BP still fluctuating. No distress noted. Will continue to monitor.  Taken 6/5/2021 1118  Plan of Care Reviewed With: patient   Goal Outcome Evaluation:  Plan of Care Reviewed With: patient     Outcome Summary: Medications adjusted yesterday per nephrology. Urine output increased. BP still fluctuating. No distress noted. Will continue to monitor.

## 2021-06-06 NOTE — PROGRESS NOTES
" LOS: 2 days   Patient Care Team:  Marizol Puentes MD as PCP - General (Family Medicine)    Chief Complaint: seizure    Subjective     History of Present Illness    Subjective:  Symptoms:  No shortness of breath or chest pain.  (No dizziness, soa, or trouble voiding, feels better).        History taken from: patient    Objective     Vital Sign Min/Max for last 24 hours  Temp  Min: 97.8 °F (36.6 °C)  Max: 98.9 °F (37.2 °C)   BP  Min: 149/73  Max: 189/97   Pulse  Min: 70  Max: 76   Resp  Min: 16  Max: 18   SpO2  Min: 97 %  Max: 97 %   No data recorded   Weight  Min: 107 kg (235 lb 10.8 oz)  Max: 107 kg (235 lb 10.8 oz)     Flowsheet Rows      First Filed Value   Admission Height  175.3 cm (69\") Documented at 06/04/2021 2120   Admission Weight  107 kg (234 lb 12.6 oz) Documented at 06/04/2021 2120          I/O this shift:  In: -   Out: 325 [Urine:325]  I/O last 3 completed shifts:  In: 1200 [P.O.:1200]  Out: 1925 [Urine:1925]    Objective:  General Appearance:  Comfortable and in no acute distress.    Vital signs: (most recent): Blood pressure 149/73, pulse 70, temperature 97.9 °F (36.6 °C), resp. rate 16, height 175.3 cm (69\"), weight 107 kg (235 lb 10.8 oz), SpO2 97 %.  No fever.    Output: Producing urine.    HEENT: Normal HEENT exam.    Lungs:  Normal effort and normal respiratory rate.  Breath sounds clear to auscultation.    Heart: Normal rate.  Regular rhythm.  S1 normal and S2 normal.    Abdomen: Abdomen is soft.  There is no mass. There is no splenomegaly. There is no hepatomegaly.   Pulses: Distal pulses are intact.    Neurological: Patient is alert and oriented to person, place and time.    Skin:  Warm and dry.              Results Review:     I reviewed the patient's new clinical results.    WBC WBC   Date Value Ref Range Status   06/06/2021 10.00 3.40 - 10.80 10*3/mm3 Final   06/05/2021 11.00 (H) 3.40 - 10.80 10*3/mm3 Final      HGB Hemoglobin   Date Value Ref Range Status   06/06/2021 12.0 (L) 13.0 " - 17.7 g/dL Final   06/05/2021 11.6 (L) 13.0 - 17.7 g/dL Final      HCT Hematocrit   Date Value Ref Range Status   06/06/2021 34.1 (L) 37.5 - 51.0 % Final   06/05/2021 33.1 (L) 37.5 - 51.0 % Final      Platlets No results found for: LABPLAT   MCV MCV   Date Value Ref Range Status   06/06/2021 80.0 79.0 - 97.0 fL Final   06/05/2021 79.1 79.0 - 97.0 fL Final          Sodium Sodium   Date Value Ref Range Status   06/06/2021 138 136 - 145 mmol/L Final   06/05/2021 136 136 - 145 mmol/L Final      Potassium Potassium   Date Value Ref Range Status   06/06/2021 4.0 3.5 - 5.2 mmol/L Final   06/05/2021 3.8 3.5 - 5.2 mmol/L Final      Chloride Chloride   Date Value Ref Range Status   06/06/2021 107 98 - 107 mmol/L Final   06/05/2021 104 98 - 107 mmol/L Final      CO2 CO2   Date Value Ref Range Status   06/06/2021 20.0 (L) 22.0 - 29.0 mmol/L Final   06/05/2021 21.0 (L) 22.0 - 29.0 mmol/L Final      BUN BUN   Date Value Ref Range Status   06/06/2021 24 (H) 8 - 23 mg/dL Final   06/05/2021 27 (H) 8 - 23 mg/dL Final      Creatinine Creatinine   Date Value Ref Range Status   06/06/2021 1.85 (H) 0.76 - 1.27 mg/dL Final   06/05/2021 2.33 (H) 0.76 - 1.27 mg/dL Final      Calcium Calcium   Date Value Ref Range Status   06/06/2021 7.8 (L) 8.6 - 10.5 mg/dL Final   06/05/2021 8.0 (L) 8.6 - 10.5 mg/dL Final      PO4 No results found for: CAPO4   Albumin Albumin   Date Value Ref Range Status   06/06/2021 2.80 (L) 3.50 - 5.20 g/dL Final      Magnesium No results found for: MG   Uric Acid No results found for: URICACID     Medication Review:   Current Facility-Administered Medications:   •  acetaminophen (TYLENOL) tablet 650 mg, 650 mg, Oral, Q4H PRN, Swati Rivers MD  •  amLODIPine (NORVASC) tablet 10 mg, 10 mg, Oral, Daily, Swati Rivers MD, 10 mg at 06/06/21 0848  •  aspirin EC tablet 81 mg, 81 mg, Oral, Daily, Swati Rivers MD, 81 mg at 06/06/21 0848  •  atorvastatin (LIPITOR) tablet 10 mg, 10 mg, Oral, Daily, Swati Rivers MD, 10 mg at  06/06/21 0848  •  clopidogrel (PLAVIX) tablet 75 mg, 75 mg, Oral, Daily, Swati Rivers MD, 75 mg at 06/06/21 0848  •  dextrose (D50W) 25 g/ 50mL Intravenous Solution 25 g, 25 g, Intravenous, Q15 Min PRN, Swati Rivers MD  •  dextrose (GLUTOSE) oral gel 15 g, 15 g, Oral, Q15 Min PRN, Swati Rivers MD  •  docusate sodium (COLACE) capsule 100 mg, 100 mg, Oral, Daily, Swati Rivers MD, 100 mg at 06/06/21 0848  •  escitalopram (LEXAPRO) tablet 10 mg, 10 mg, Oral, Daily, Swati Rivers MD, 10 mg at 06/06/21 0849  •  glucagon (human recombinant) (GLUCAGEN DIAGNOSTIC) injection 1 mg, 1 mg, Subcutaneous, Q15 Min PRN, Swati Rivers MD  •  heparin (porcine) 5000 UNIT/ML injection 5,000 Units, 5,000 Units, Subcutaneous, Q12H, Courtney Neri DO, 5,000 Units at 06/06/21 0847  •  hydrALAZINE (APRESOLINE) injection 10 mg, 10 mg, Intravenous, Q4H PRN, Be Franks MD, 10 mg at 06/05/21 2217  •  hydrALAZINE (APRESOLINE) tablet 10 mg, 10 mg, Oral, Q8H, Be Franks MD  •  insulin glargine (LANTUS, SEMGLEE) injection 10 Units, 10 Units, Subcutaneous, Nightly, Swati Rivers MD, 10 Units at 06/05/21 2214  •  insulin lispro (ADMELOG) injection 0-14 Units, 0-14 Units, Subcutaneous, TID AC, 3 Units at 06/06/21 1126 **AND** insulin lispro (ADMELOG) injection 0-14 Units, 0-14 Units, Subcutaneous, PRN, Swati Rivers MD  •  insulin lispro (ADMELOG) injection 5 Units, 5 Units, Subcutaneous, TID With Meals, Swati Rivers MD, 5 Units at 06/06/21 1126  •  levETIRAcetam (KEPPRA) tablet 500 mg, 500 mg, Oral, BID, Swati Rivers MD, 500 mg at 06/06/21 0848  •  levothyroxine (SYNTHROID, LEVOTHROID) tablet 25 mcg, 25 mcg, Oral, Q AM, Swati Rivers MD, 25 mcg at 06/06/21 0701  •  metoprolol succinate XL (TOPROL-XL) 24 hr tablet 50 mg, 50 mg, Oral, Q24H, Be Franks MD, 50 mg at 06/06/21 0848  •  ondansetron (ZOFRAN) injection 4 mg, 4 mg, Intravenous, Q6H PRN, Swati Rivers MD  •  sodium chloride 0.9 % flush 10 mL, 10 mL, Intravenous, Q12H,  Swati Rivers MD, 10 mL at 06/06/21 0849  •  sodium chloride 0.9 % flush 10 mL, 10 mL, Intravenous, PRN, Swati Rivers MD  •  tamsulosin (FLOMAX) 24 hr capsule 0.4 mg, 0.4 mg, Oral, Nightly, Neri, Trevariwaldemara, DO, 0.4 mg at 06/05/21 4412      Assessment/Plan       Acute kidney injury superimposed on chronic kidney disease (CMS/HCC)    Hypertensive emergency    Elevated troponin level      Assessment:  (1. tosha-creatinine fell with ivf and improved blood pressure control, heplock ivf and check lab in am  2. [id number] baseine, due to hypertension  3. Uncontrolled hypertension-long discussion with him about the need to keep his bp <140/90, he wonders if the flomax is causing his dizziness, gradually increase rx  4. Hypovolemia-better  5. Seizure disorder).       Be Franks MD  06/06/21  13:19 EDT    Time:

## 2021-06-07 ENCOUNTER — APPOINTMENT (OUTPATIENT)
Dept: NUCLEAR MEDICINE | Facility: HOSPITAL | Age: 66
End: 2021-06-07

## 2021-06-07 LAB
ANION GAP SERPL CALCULATED.3IONS-SCNC: 10 MMOL/L (ref 5–15)
BH CV REST NUCLEAR ISOTOPE DOSE: 7.3 MCI
BH CV STRESS BP STAGE 1: NORMAL
BH CV STRESS BP STAGE 2: NORMAL
BH CV STRESS BP STAGE 3: NORMAL
BH CV STRESS BP STAGE 4: NORMAL
BH CV STRESS COMMENTS STAGE 1: NORMAL
BH CV STRESS COMMENTS STAGE 2: NORMAL
BH CV STRESS DOSE REGADENOSON STAGE 1: 0.4
BH CV STRESS DURATION MIN STAGE 1: 0
BH CV STRESS DURATION MIN STAGE 2: 4
BH CV STRESS DURATION SEC STAGE 1: 10
BH CV STRESS DURATION SEC STAGE 2: 0
BH CV STRESS HR STAGE 1: 74
BH CV STRESS HR STAGE 2: 87
BH CV STRESS HR STAGE 3: 87
BH CV STRESS HR STAGE 4: 86
BH CV STRESS NUCLEAR ISOTOPE DOSE: 21.1 MCI
BH CV STRESS PROTOCOL 1: NORMAL
BH CV STRESS RECOVERY BP: NORMAL MMHG
BH CV STRESS RECOVERY HR: 83 BPM
BH CV STRESS STAGE 1: 1
BH CV STRESS STAGE 2: 2
BH CV STRESS STAGE 3: 3
BH CV STRESS STAGE 4: 4
BUN SERPL-MCNC: 24 MG/DL (ref 8–23)
BUN/CREAT SERPL: 12.4 (ref 7–25)
CALCIUM SPEC-SCNC: 8.2 MG/DL (ref 8.6–10.5)
CHLORIDE SERPL-SCNC: 107 MMOL/L (ref 98–107)
CO2 SERPL-SCNC: 19 MMOL/L (ref 22–29)
CREAT SERPL-MCNC: 1.94 MG/DL (ref 0.76–1.27)
GFR SERPL CREATININE-BSD FRML MDRD: 35 ML/MIN/1.73
GLUCOSE BLDC GLUCOMTR-MCNC: 116 MG/DL (ref 70–105)
GLUCOSE BLDC GLUCOMTR-MCNC: 141 MG/DL (ref 70–105)
GLUCOSE BLDC GLUCOMTR-MCNC: 163 MG/DL (ref 70–105)
GLUCOSE BLDC GLUCOMTR-MCNC: 168 MG/DL (ref 70–105)
GLUCOSE SERPL-MCNC: 137 MG/DL (ref 65–99)
LV EF NUC BP: 48 %
MAXIMAL PREDICTED HEART RATE: 155 BPM
PERCENT MAX PREDICTED HR: 55.48 %
POTASSIUM SERPL-SCNC: 4.1 MMOL/L (ref 3.5–5.2)
SARS-COV-2 RNA PNL SPEC NAA+PROBE: NOT DETECTED
SODIUM SERPL-SCNC: 136 MMOL/L (ref 136–145)
STRESS BASELINE BP: NORMAL MMHG
STRESS BASELINE HR: 74 BPM
STRESS PERCENT HR: 65 %
STRESS POST PEAK BP: NORMAL MMHG
STRESS POST PEAK HR: 86 BPM
STRESS TARGET HR: 132 BPM

## 2021-06-07 PROCEDURE — 99232 SBSQ HOSP IP/OBS MODERATE 35: CPT | Performed by: INTERNAL MEDICINE

## 2021-06-07 PROCEDURE — U0003 INFECTIOUS AGENT DETECTION BY NUCLEIC ACID (DNA OR RNA); SEVERE ACUTE RESPIRATORY SYNDROME CORONAVIRUS 2 (SARS-COV-2) (CORONAVIRUS DISEASE [COVID-19]), AMPLIFIED PROBE TECHNIQUE, MAKING USE OF HIGH THROUGHPUT TECHNOLOGIES AS DESCRIBED BY CMS-2020-01-R: HCPCS | Performed by: INTERNAL MEDICINE

## 2021-06-07 PROCEDURE — 25010000002 HEPARIN (PORCINE) PER 1000 UNITS: Performed by: INTERNAL MEDICINE

## 2021-06-07 PROCEDURE — A9500 TC99M SESTAMIBI: HCPCS | Performed by: HOSPITALIST

## 2021-06-07 PROCEDURE — 78452 HT MUSCLE IMAGE SPECT MULT: CPT

## 2021-06-07 PROCEDURE — 82962 GLUCOSE BLOOD TEST: CPT

## 2021-06-07 PROCEDURE — 80048 BASIC METABOLIC PNL TOTAL CA: CPT | Performed by: INTERNAL MEDICINE

## 2021-06-07 PROCEDURE — 97162 PT EVAL MOD COMPLEX 30 MIN: CPT

## 2021-06-07 PROCEDURE — 93018 CV STRESS TEST I&R ONLY: CPT | Performed by: INTERNAL MEDICINE

## 2021-06-07 PROCEDURE — 93017 CV STRESS TEST TRACING ONLY: CPT

## 2021-06-07 PROCEDURE — 93016 CV STRESS TEST SUPVJ ONLY: CPT | Performed by: INTERNAL MEDICINE

## 2021-06-07 PROCEDURE — 63710000001 INSULIN LISPRO (HUMAN) PER 5 UNITS: Performed by: INTERNAL MEDICINE

## 2021-06-07 PROCEDURE — U0005 INFEC AGEN DETEC AMPLI PROBE: HCPCS | Performed by: INTERNAL MEDICINE

## 2021-06-07 PROCEDURE — 78452 HT MUSCLE IMAGE SPECT MULT: CPT | Performed by: INTERNAL MEDICINE

## 2021-06-07 PROCEDURE — 0 TECHNETIUM SESTAMIBI: Performed by: HOSPITALIST

## 2021-06-07 PROCEDURE — 99232 SBSQ HOSP IP/OBS MODERATE 35: CPT | Performed by: HOSPITALIST

## 2021-06-07 PROCEDURE — 25010000002 REGADENOSON 0.4 MG/5ML SOLUTION: Performed by: HOSPITALIST

## 2021-06-07 RX ORDER — INSULIN GLARGINE 100 [IU]/ML
10 INJECTION, SOLUTION SUBCUTANEOUS
Status: DISCONTINUED | OUTPATIENT
Start: 2021-06-08 | End: 2021-06-12

## 2021-06-07 RX ORDER — LOSARTAN POTASSIUM 50 MG/1
50 TABLET ORAL
Status: DISCONTINUED | OUTPATIENT
Start: 2021-06-07 | End: 2021-06-13

## 2021-06-07 RX ADMIN — TECHNETIUM TC 99M SESTAMIBI 1 DOSE: 1 INJECTION INTRAVENOUS at 11:40

## 2021-06-07 RX ADMIN — AMLODIPINE BESYLATE 10 MG: 5 TABLET ORAL at 09:33

## 2021-06-07 RX ADMIN — HEPARIN SODIUM 5000 UNITS: 5000 INJECTION INTRAVENOUS; SUBCUTANEOUS at 20:40

## 2021-06-07 RX ADMIN — REGADENOSON 0.4 MG: 0.08 INJECTION, SOLUTION INTRAVENOUS at 11:40

## 2021-06-07 RX ADMIN — HEPARIN SODIUM 5000 UNITS: 5000 INJECTION INTRAVENOUS; SUBCUTANEOUS at 09:34

## 2021-06-07 RX ADMIN — ATORVASTATIN CALCIUM 40 MG: 40 TABLET, FILM COATED ORAL at 09:33

## 2021-06-07 RX ADMIN — LEVOTHYROXINE SODIUM 25 MCG: 0.03 TABLET ORAL at 06:11

## 2021-06-07 RX ADMIN — ASPIRIN 81 MG: 81 TABLET, COATED ORAL at 09:33

## 2021-06-07 RX ADMIN — TECHNETIUM TC 99M SESTAMIBI 1 DOSE: 1 INJECTION INTRAVENOUS at 10:25

## 2021-06-07 RX ADMIN — DOCUSATE SODIUM 100 MG: 100 CAPSULE, LIQUID FILLED ORAL at 09:33

## 2021-06-07 RX ADMIN — Medication 10 ML: at 20:41

## 2021-06-07 RX ADMIN — INSULIN LISPRO 5 UNITS: 100 INJECTION, SOLUTION INTRAVENOUS; SUBCUTANEOUS at 12:07

## 2021-06-07 RX ADMIN — TAMSULOSIN HYDROCHLORIDE 0.4 MG: 0.4 CAPSULE ORAL at 20:41

## 2021-06-07 RX ADMIN — HYDRALAZINE HYDROCHLORIDE 50 MG: 25 TABLET, FILM COATED ORAL at 20:41

## 2021-06-07 RX ADMIN — LEVETIRACETAM 500 MG: 500 TABLET ORAL at 20:41

## 2021-06-07 RX ADMIN — LEVETIRACETAM 500 MG: 500 TABLET ORAL at 09:33

## 2021-06-07 RX ADMIN — INSULIN LISPRO 3 UNITS: 100 INJECTION, SOLUTION INTRAVENOUS; SUBCUTANEOUS at 09:34

## 2021-06-07 RX ADMIN — CLOPIDOGREL BISULFATE 75 MG: 75 TABLET ORAL at 09:33

## 2021-06-07 RX ADMIN — INSULIN LISPRO 5 UNITS: 100 INJECTION, SOLUTION INTRAVENOUS; SUBCUTANEOUS at 18:11

## 2021-06-07 RX ADMIN — LOSARTAN POTASSIUM 50 MG: 50 TABLET, FILM COATED ORAL at 12:07

## 2021-06-07 RX ADMIN — ESCITALOPRAM OXALATE 10 MG: 10 TABLET ORAL at 09:34

## 2021-06-07 RX ADMIN — HYDRALAZINE HYDROCHLORIDE 50 MG: 25 TABLET, FILM COATED ORAL at 06:11

## 2021-06-07 RX ADMIN — Medication 10 ML: at 09:34

## 2021-06-07 RX ADMIN — INSULIN LISPRO 5 UNITS: 100 INJECTION, SOLUTION INTRAVENOUS; SUBCUTANEOUS at 09:34

## 2021-06-07 RX ADMIN — METOPROLOL SUCCINATE 50 MG: 50 TABLET, EXTENDED RELEASE ORAL at 09:33

## 2021-06-07 NOTE — PROGRESS NOTES
CARDIOLOGY FOLLOW-UP PROGRESS NOTE      Reason for follow-up:    Elevated troponin    -1.0 at outlying facility, 0.233 here  CAD with prior PCI to LCx, RCA 2011  Uncontrolled HTN:  Pt has stopped all antihypertensive agents for last month  DM  Dyslipidemia  Hx Seizure disorder with possible Seizure  Non compliance with medical therapy     Attending: Caty Marroquin MD      SUBJECTIVE:    No chest pain or shortness of breath.  Lying in bed comfortably     Review of Systems   General: denies fever, chills, anorexia, weight loss  Eyes: denies blurring, diplopia  Ear/Nose/Throat: denies ear pain, nosebleeds, hoarseness  Cardiovascular: See HPI  Respiratory: denies excessive sputum, hemoptysis, wheezing  Gastrointestinal: denies nausea, vomiting, change in bowel habits, abdominal pain  Genitourinary: denies dysuria and hematuria  Musculoskeletal: denies back pain, joint pain, joint swelling, muscle cramps, weakness  Skin: denies rashes, itching, suspicious lesions  Neurologic: denies focal neuro deficits  Psychiatric: denies depression, anxiety  Endocrine: denies cold intolerance, heat intolerance  Hematologic/Lymphatic: denies abnormal bruising, bleeding  Allergic/Immunologic: denies urticaria or persistent infections      Allergies: Shellfish allergy    Scheduled Meds:amLODIPine, 10 mg, Oral, Daily  aspirin, 81 mg, Oral, Daily  atorvastatin, 40 mg, Oral, Daily  clopidogrel, 75 mg, Oral, Daily  docusate sodium, 100 mg, Oral, Daily  escitalopram, 10 mg, Oral, Daily  heparin (porcine), 5,000 Units, Subcutaneous, Q12H  hydrALAZINE, 50 mg, Oral, BID  insulin glargine, 10 Units, Subcutaneous, Nightly  insulin lispro, 0-14 Units, Subcutaneous, TID AC  insulin lispro, 5 Units, Subcutaneous, TID With Meals  levETIRAcetam, 500 mg, Oral, BID  levothyroxine, 25 mcg, Oral, Q AM  losartan, 50 mg, Oral, Q24H  metoprolol succinate XL, 50 mg, Oral, Q24H  sodium chloride, 10 mL, Intravenous, Q12H  tamsulosin, 0.4 mg, Oral,  Nightly        Continuous Infusions:     PRN Meds:.•  acetaminophen  •  dextrose  •  dextrose  •  glucagon (human recombinant)  •  hydrALAZINE  •  insulin lispro **AND** insulin lispro  •  ondansetron  •  sodium chloride    Objective     Vital Signs  Vitals:    06/07/21 0007 06/07/21 0255 06/07/21 0500 06/07/21 0558   BP: 153/67 158/62  174/84   BP Location:  Left arm  Left arm   Patient Position:  Lying  Lying   Pulse: 81 85  77   Resp:  18  18   Temp:  98.1 °F (36.7 °C)  98 °F (36.7 °C)   TempSrc:  Oral  Oral   SpO2:  100%  100%   Weight:   110 kg (242 lb 8.1 oz)    Height:         Wt Readings from Last 1 Encounters:   06/07/21 110 kg (242 lb 8.1 oz)       Physical Exam:     General Appearance:    Alert, oriented, no acute distress   Neck:  Supple without JVD or bruit   Lungs:    Grossly clear with adequate airflow bilaterally    Heart:    Regular rate and rhythm, normal S1 and S2, no            murmur, no gallop, no rub, no click   Chest Wall/Thorax:    No abnormalities observed   Abdomen:     Normal bowel sounds, no masses, no organomegaly, soft        non-tender, non-distended, no guarding, no rebound                tenderness   Extremities:  Range of motion adequate, no edema, no cyanosis, no             redness   Pulses:   Pulses palpable and equal bilaterally   Skin:   No bleeding, bruising or rash   Neurologic:  No focal deficits               Results Review:     CBC    Results from last 7 days   Lab Units 06/06/21  0511 06/05/21  0000   WBC 10*3/mm3 10.00 11.00*   HEMOGLOBIN g/dL 12.0* 11.6*   PLATELETS 10*3/mm3 179 202     BMP   Results from last 7 days   Lab Units 06/07/21  0234 06/06/21  0511 06/05/21  0001   SODIUM mmol/L 136 138 136   POTASSIUM mmol/L 4.1 4.0 3.8   CHLORIDE mmol/L 107 107 104   CO2 mmol/L 19.0* 20.0* 21.0*   BUN mg/dL 24* 24* 27*   CREATININE mg/dL 1.94* 1.85* 2.33*   GLUCOSE mg/dL 137* 144* 219*   PHOSPHORUS mg/dL  --   --  3.7     HbA1C   Lab Results   Component Value Date    HGBA1C  6.4 (H) 06/05/2021    HGBA1C 7.1 (H) 12/31/2019    HGBA1C 7.6 (H) 07/08/2019     CMP   Results from last 7 days   Lab Units 06/07/21  0234 06/06/21  0511 06/05/21  0001   SODIUM mmol/L 136 138 136   POTASSIUM mmol/L 4.1 4.0 3.8   CHLORIDE mmol/L 107 107 104   CO2 mmol/L 19.0* 20.0* 21.0*   BUN mg/dL 24* 24* 27*   CREATININE mg/dL 1.94* 1.85* 2.33*   GLUCOSE mg/dL 137* 144* 219*   ALBUMIN g/dL  --  2.80*  --    BILIRUBIN mg/dL  --  0.5  --    ALK PHOS U/L  --  102  --    AST (SGOT) U/L  --  13  --    ALT (SGPT) U/L  --  7  --      Radiology(recent) US Renal Bilateral    Result Date: 6/5/2021  1. No evidence of acute urinary obstruction. 2. Enlarged prostate. Electronically signed by:  Jesu Spicer M.D.  6/5/2021 6:34 PM      Cardiac Studies:  No new studies; telemetry shows sinus rhythm    Medication Review:   Scheduled Meds:amLODIPine, 10 mg, Oral, Daily  aspirin, 81 mg, Oral, Daily  atorvastatin, 40 mg, Oral, Daily  clopidogrel, 75 mg, Oral, Daily  docusate sodium, 100 mg, Oral, Daily  escitalopram, 10 mg, Oral, Daily  heparin (porcine), 5,000 Units, Subcutaneous, Q12H  hydrALAZINE, 50 mg, Oral, BID  insulin glargine, 10 Units, Subcutaneous, Nightly  insulin lispro, 0-14 Units, Subcutaneous, TID AC  insulin lispro, 5 Units, Subcutaneous, TID With Meals  levETIRAcetam, 500 mg, Oral, BID  levothyroxine, 25 mcg, Oral, Q AM  losartan, 50 mg, Oral, Q24H  metoprolol succinate XL, 50 mg, Oral, Q24H  sodium chloride, 10 mL, Intravenous, Q12H  tamsulosin, 0.4 mg, Oral, Nightly      Continuous Infusions:   PRN Meds:.•  acetaminophen  •  dextrose  •  dextrose  •  glucagon (human recombinant)  •  hydrALAZINE  •  insulin lispro **AND** insulin lispro  •  ondansetron  •  sodium chloride    Assessment:  levated troponin    -1.0 at outlying facility, 0.233 here  CAD with prior PCI to LCx, RCA 2011  Uncontrolled HTN:  Pt has stopped all antihypertensive agents for last month  DM  Dyslipidemia  Hx Seizure disorder with possible  Seizure  Non compliance with medical therapy  Combined hyperlipidemia      Plan:  Patient presented after a seizure  Patient did not have any chest pain but had elevated troponin consistent with is a non-STEMI or versus renal insufficiency  Patient had previous stent placement to the circumflex artery and RCA  Patient blood pressure very high and his medications have been adjusted  Patient sugar levels and lipid levels are followed by the primary care doctor  Patient is noncompliant with medications also.  Patient is having a stress Myoview study following which further treatment options will be discussed with the patient including cardiac catheterization if needed.  Patient is followed by nephrologist and his renal function has been stable    Eligio Robles MD  06/07/21  11:58 EDT      This report was generated using the Dragon voice recognition system.

## 2021-06-07 NOTE — PLAN OF CARE
Goal Outcome Evaluation:  Plan of Care Reviewed With: patient        Progress: improving  Outcome Summary: Patient has been NPO and had myoview this morning. Blood pressures remain high. Patient worked with pt and does well getting up but has trouble seeing due to his vision loss. Will continue to monitor.

## 2021-06-07 NOTE — PLAN OF CARE
Goal Outcome Evaluation:  Plan of Care Reviewed With: patient  Progress: no change   The patient's blood pressure was elevated twice tonight so far, PRN medication was needed. Luisito WHYTE aware and has increased the patient's blood pressure medication. The patient seems frustrated and not agreeable to any cardiac work up at this time, patient was educated on their importance and encouraged to speak with MD about concerns. Will continue to monitor.

## 2021-06-07 NOTE — PROGRESS NOTES
CARDIOLOGY FOLLOW-UP PROGRESS NOTE      Reason for follow-up:    Elevated troponin    -1.0 at outlying facility, 0.233 here  CAD with prior PCI to LCx, RCA 2011  Uncontrolled HTN:  Pt has stopped all antihypertensive agents for last month  DM  Dyslipidemia  Hx Seizure disorder with possible Seizure  Non compliance with medical therapy     Attending: Courtney Neri DO      SUBJECTIVE:    Resting comfortably lying flat this evening with notably improved personal hygiene.  No complaints of chest pain or shortness of air.     Review of Systems   General: denies fever, chills, anorexia, weight loss  Eyes: denies blurring, diplopia  Ear/Nose/Throat: denies ear pain, nosebleeds, hoarseness  Cardiovascular: See HPI  Respiratory: denies excessive sputum, hemoptysis, wheezing  Gastrointestinal: denies nausea, vomiting, change in bowel habits, abdominal pain  Genitourinary: denies dysuria and hematuria  Musculoskeletal: denies back pain, joint pain, joint swelling, muscle cramps, weakness  Skin: denies rashes, itching, suspicious lesions  Neurologic: denies focal neuro deficits  Psychiatric: denies depression, anxiety  Endocrine: denies cold intolerance, heat intolerance  Hematologic/Lymphatic: denies abnormal bruising, bleeding  Allergic/Immunologic: denies urticaria or persistent infections      Allergies: Shellfish allergy    Scheduled Meds:amLODIPine, 10 mg, Oral, Daily  aspirin, 81 mg, Oral, Daily  atorvastatin, 10 mg, Oral, Daily  clopidogrel, 75 mg, Oral, Daily  docusate sodium, 100 mg, Oral, Daily  escitalopram, 10 mg, Oral, Daily  heparin (porcine), 5,000 Units, Subcutaneous, Q12H  hydrALAZINE, 10 mg, Oral, Q8H  insulin glargine, 10 Units, Subcutaneous, Nightly  insulin lispro, 0-14 Units, Subcutaneous, TID AC  insulin lispro, 5 Units, Subcutaneous, TID With Meals  levETIRAcetam, 500 mg, Oral, BID  levothyroxine, 25 mcg, Oral, Q AM  metoprolol succinate XL, 50 mg, Oral, Q24H  sodium chloride, 10 mL, Intravenous,  Q12H  tamsulosin, 0.4 mg, Oral, Nightly        Continuous Infusions:     PRN Meds:.•  acetaminophen  •  dextrose  •  dextrose  •  glucagon (human recombinant)  •  hydrALAZINE  •  insulin lispro **AND** insulin lispro  •  ondansetron  •  sodium chloride    Objective     Vital Signs  Vitals:    06/06/21 1112 06/06/21 1504 06/06/21 1748 06/06/21 2030   BP: 149/73 156/83 161/80 173/84   BP Location:       Patient Position:       Pulse: 70 69 73 72   Resp: 16 20 16    Temp: 97.9 °F (36.6 °C) 97.8 °F (36.6 °C) 97.8 °F (36.6 °C)    TempSrc:       SpO2:  99% 97%    Weight:       Height:         Wt Readings from Last 1 Encounters:   06/06/21 107 kg (235 lb 10.8 oz)       Physical Exam:     General Appearance:    Alert, oriented, no acute distress   Neck:  Supple without JVD or bruit   Lungs:    Grossly clear with adequate airflow bilaterally    Heart:    Regular rate and rhythm, normal S1 and S2, no            murmur, no gallop, no rub, no click   Chest Wall/Thorax:    No abnormalities observed   Abdomen:     Normal bowel sounds, no masses, no organomegaly, soft        non-tender, non-distended, no guarding, no rebound                tenderness   Extremities:  Range of motion adequate, no edema, no cyanosis, no             redness   Pulses:   Pulses palpable and equal bilaterally   Skin:   No bleeding, bruising or rash   Neurologic:  No focal deficits               Results Review:     CBC    Results from last 7 days   Lab Units 06/06/21  0511 06/05/21  0000   WBC 10*3/mm3 10.00 11.00*   HEMOGLOBIN g/dL 12.0* 11.6*   PLATELETS 10*3/mm3 179 202     BMP   Results from last 7 days   Lab Units 06/06/21  0511 06/05/21  0001   SODIUM mmol/L 138 136   POTASSIUM mmol/L 4.0 3.8   CHLORIDE mmol/L 107 104   CO2 mmol/L 20.0* 21.0*   BUN mg/dL 24* 27*   CREATININE mg/dL 1.85* 2.33*   GLUCOSE mg/dL 144* 219*   PHOSPHORUS mg/dL  --  3.7     HbA1C   Lab Results   Component Value Date    HGBA1C 6.4 (H) 06/05/2021    HGBA1C 7.1 (H) 12/31/2019     HGBA1C 7.6 (H) 07/08/2019     CMP   Results from last 7 days   Lab Units 06/06/21  0511 06/05/21  0001   SODIUM mmol/L 138 136   POTASSIUM mmol/L 4.0 3.8   CHLORIDE mmol/L 107 104   CO2 mmol/L 20.0* 21.0*   BUN mg/dL 24* 27*   CREATININE mg/dL 1.85* 2.33*   GLUCOSE mg/dL 144* 219*   ALBUMIN g/dL 2.80*  --    BILIRUBIN mg/dL 0.5  --    ALK PHOS U/L 102  --    AST (SGOT) U/L 13  --    ALT (SGPT) U/L 7  --      Radiology(recent) US Renal Bilateral    Result Date: 6/5/2021  1. No evidence of acute urinary obstruction. 2. Enlarged prostate. Electronically signed by:  Jesu Spicer M.D.  6/5/2021 6:34 PM      Cardiac Studies:  No new studies; telemetry shows sinus rhythm    Medication Review:   Scheduled Meds:amLODIPine, 10 mg, Oral, Daily  aspirin, 81 mg, Oral, Daily  atorvastatin, 10 mg, Oral, Daily  clopidogrel, 75 mg, Oral, Daily  docusate sodium, 100 mg, Oral, Daily  escitalopram, 10 mg, Oral, Daily  heparin (porcine), 5,000 Units, Subcutaneous, Q12H  hydrALAZINE, 10 mg, Oral, Q8H  insulin glargine, 10 Units, Subcutaneous, Nightly  insulin lispro, 0-14 Units, Subcutaneous, TID AC  insulin lispro, 5 Units, Subcutaneous, TID With Meals  levETIRAcetam, 500 mg, Oral, BID  levothyroxine, 25 mcg, Oral, Q AM  metoprolol succinate XL, 50 mg, Oral, Q24H  sodium chloride, 10 mL, Intravenous, Q12H  tamsulosin, 0.4 mg, Oral, Nightly      Continuous Infusions:   PRN Meds:.•  acetaminophen  •  dextrose  •  dextrose  •  glucagon (human recombinant)  •  hydrALAZINE  •  insulin lispro **AND** insulin lispro  •  ondansetron  •  sodium chloride    Assessment:  levated troponin    -1.0 at outlying facility, 0.233 here  CAD with prior PCI to LCx, RCA 2011  Uncontrolled HTN:  Pt has stopped all antihypertensive agents for last month  DM  Dyslipidemia  Hx Seizure disorder with possible Seizure  Non compliance with medical therapy  Combined hyperlipidemia      Plan:  Continue current medical management supportive cares patient appears  hemodynamically stable, well compensated and asymptomatic from cardiac standpoint.  We will increase hydralazine and change to twice daily regimen which patient is more likely to compliant with.  We will plan noninvasive ischemic evaluation now much more clinically stable.      I discussed the patients findings and my recommendations with patient and bedside nurse for coordination of care.      JEANNE Jorge MD  06/06/21  22:28 EDT      This report was generated using the Dragon voice recognition system.

## 2021-06-07 NOTE — CASE MANAGEMENT/SOCIAL WORK
Continued Stay Note  SHARIF Cardona     Patient Name: Isaias Molina  MRN: [id number]  Today's Date: 6/7/2021    Admit Date: 6/4/2021    Discharge Plan     Row Name 06/07/21 1641       Plan    Patient/Family in Agreement with Plan  unable to assess    Plan Comments  Attempted to meet with patient to discuss dc planning but he was off the floor having stress test performed. CM will follow-up at later time to perform case management assessment. DC Barriers: PT eval pending, stress test results pending.     Phone communication or documentation only - no physical contact with patient or family.    Nayeli Sahu RN     Office Phone: [phone]  Office Cell: [phone]

## 2021-06-07 NOTE — THERAPY EVALUATION
Patient Name: Isaias Molina  : 1955    MRN: [id number]                              Today's Date: 2021       Admit Date: 2021    Visit Dx: No diagnosis found.  Patient Active Problem List   Diagnosis   • Abnormal gait   • Benign essential hypertension   • Cerebrovascular accident (CVA) (CMS/[facility])   • Coronary artery disease   • Homonymous hemianopia   • Mixed hyperlipidemia   • Obesity   • Resting tremor   • Tremor, essential   • Type 2 diabetes mellitus (CMS/[facility])   • Vitamin B12 deficiency   • Episode of recurrent major depressive disorder (CMS/[facility])   • Chronic kidney insufficiency, stage 3 (moderate) (CMS/[facility])   • Elevated PSA   • Acquired hypothyroidism   • Seizure as late effect of cerebrovascular accident (CVA) (CMS/[facility])   • Diabetic retinopathy (CMS/[facility])   • Hypertensive emergency   • Acute kidney injury superimposed on chronic kidney disease (CMS/[facility])   • Elevated troponin level     Past Medical History:   Diagnosis Date   • Cellulitis 10/15/2013   • Encounter for screening for malignant neoplasm of prostate 2017   • Encounter for screening for other disorder 2017   • Need for prophylactic vaccination against Streptococcus pneumoniae (pneumococcus) 10/19/2018   • Onychomycosis 10/19/2018   • Pain in right shoulder 10/3/2018   • TIA (transient ischemic attack) 2020     Past Surgical History:   Procedure Laterality Date   • CATARACT EXTRACTION WITH INTRAOCULAR LENS IMPLANT Bilateral    • CORONARY ANGIOPLASTY WITH STENT PLACEMENT      PTCA - LCx & RCA with stent   • ROTATOR CUFF REPAIR Right    • TOTAL SHOULDER REVERSE ARTHROPLASTY Right     after fracture      General Information     Row Name 21 1241          Physical Therapy Time and Intention    Document Type  evaluation  -SS     Mode of Treatment  physical therapy  -     Row Name 21 1241          General Information    Patient Profile Reviewed  yes pt reports he is 90% visually impaired  -SS     Prior  Level of Function  independent:;ADL's;dependent:;driving uses a STC PRN  -     Existing Precautions/Restrictions  fall;seizures  -     Row Name 06/07/21 1241          Living Environment    Lives With  alone [place], son lives with him some but he now has a place of his own since pt was fairly independent  -     Row Name 06/07/21 1241          Home Main Entrance    Number of Stairs, Main Entrance  one mobile home, hold onto door frame  -     Row Name 06/07/21 1241          Stairs Within Home, Primary    Number of Stairs, Within Home, Primary  none  -     Row Name 06/07/21 1241          Cognition    Orientation Status (Cognition)  oriented x 4  -     Row Name 06/07/21 1710          Safety Issues, Functional Mobility    Impairments Affecting Function (Mobility)  balance;visual/perceptual;endurance/activity tolerance  -       User Key  (r) = Recorded By, (t) = Taken By, (c) = Cosigned By    Initials Name Provider Type     Jenna Enciso PT Physical Therapist        Mobility     Row Name 06/07/21 1710          Bed Mobility    Bed Mobility  supine-sit  -SS     Supine-Sit [place] (Bed Mobility)  supervision  -     Row Name 06/07/21 1710          Sit-Stand Transfer    Sit-Stand [place] (Transfers)  contact guard  -[facility] Name 06/07/21 1710          Gait/Stairs (Locomotion)    [place] Level (Gait)  minimum assist (75% patient effort);contact guard  -SS     Distance in Feet (Gait)  40  -SS     Deviations/Abnormal Patterns (Gait)  gait speed decreased  -     Comment (Gait/Stairs)  once out of room, pts gait speed decreased and he became more forward flexed, his verbal response times were slowed and he reported that he was dizzy. He would stop ambulating and began to require increased assist. Was concerned about pt being orthostatic but once BP assessed upon return to supine, pt was still hypertensive.  -       User Key  (r) = Recorded By, (t) = Taken By, (c) = Cosigned By     Initials Name Provider Type    SS Jenna Enciso PT Physical Therapist        Obj/Interventions     Row Name 06/07/21 1713          Range of Motion Comprehensive    General Range of Motion  no range of motion deficits identified  -     Row Name 06/07/21 1713          Strength Comprehensive (MMT)    General Manual Muscle Testing (MMT) Assessment  no strength deficits identified  -[facility] Name 06/07/21 1713          Balance    Balance Assessment  sitting static balance;standing static balance;standing dynamic balance  -SS     Static Sitting Balance  WNL  -SS     Static Standing Balance  WNL  -SS     Dynamic Standing Balance  mild impairment  -     Row Name 06/07/21 1713          Sensory Assessment (Somatosensory)    Sensory Assessment (Somatosensory)  sensation intact states he has undiagnosed neuropathy but his sensation is intact to light touch  -       User Key  (r) = Recorded By, (t) = Taken By, (c) = Cosigned By    Initials Name Provider Type    SS Jenna Enciso PT Physical Therapist        Goals/Plan     Row Name 06/07/21 1719          Bed Mobility Goal 1 (PT)    Activity/Assistive Device (Bed Mobility Goal 1, PT)  bed mobility activities, all  -SS     [place] Level/Cues Needed (Bed Mobility Goal 1, PT)  modified independence  -SS     Time Frame (Bed Mobility Goal 1, PT)  long term goal (LTG);2 weeks  -     Row Name 06/07/21 1719          Transfer Goal 1 (PT)    Activity/Assistive Device (Transfer Goal 1, PT)  transfers, all  -SS     [place] Level/Cues Needed (Transfer Goal 1, PT)  independent  -SS     Time Frame (Transfer Goal 1, PT)  long term goal (LTG);2 weeks  -     Row Name 06/07/21 1719          Gait Training Goal 1 (PT)    Activity/Assistive Device (Gait Training Goal 1, PT)  gait (walking locomotion)  -SS     [place] Level (Gait Training Goal 1, PT)  independent  -SS     Distance (Gait Training Goal 1, PT)  75'  -SS     Time Frame (Gait Training Goal 1, PT)  long  term goal (LTG);2 weeks  -       User Key  (r) = Recorded By, (t) = Taken By, (c) = Cosigned By    Initials Name Provider Type     Jenna Enciso, PT Physical Therapist        Clinical Impression     Row Name 06/07/21 1713          Pain    Additional Documentation  Pain Scale: FACES Pre/Post-Treatment (Group)  -     Row Name 06/07/21 1713          Pain Scale: FACES Pre/Post-Treatment    Pain: FACES Scale, Pretreatment  0-->no hurt  -     Posttreatment Pain Rating  0-->no hurt  -     Row Name 06/07/21 1713          Plan of Care Review    Plan of Care Reviewed With  patient  -     Outcome Summary  64 y/o M who presented with HTN emergency, self suspected seizure. Pt with hx CVA with no deficits per report. Pt recently took himself off all medications. Pt is independent with ADLs and household mobility. Pt reports he is 90% visually impaired and does not drive. Gets help from his sons when needed. His son lived with him until recently. Patient denies any falls in the last year. Upon start of session BP was 168/91 in sitting. In standing, 153/80. After ambulation 170/81. Patient req SBA for bed mobility, CGA for transfers and up to min A for ambulation. Pts postural stability worsened, gait speed decreased and response time slowed half way through ambulation. Pt with multiple stops while ambulating without explanation, cues to continue. No overt losses of balance. During ambulation, was concerned about orthostatic hypotension but was not present. Could be related to visual deficits. Anticipate that pt will be safe to d/c home once medically appropriate with HHPT and assist as needed from son.  -     Row Name 06/07/21 1713          Therapy Assessment/Plan (PT)    Rehab Potential (PT)  good, to achieve stated therapy goals  -     Criteria for Skilled Interventions Met (PT)  yes;meets criteria  -     Predicted Duration of Therapy Intervention (PT)  until d/c  -     Row Name 06/07/21 1713           Positioning and Restraints    Pre-Treatment Position  in bed  -SS     Post Treatment Position  bed  -SS     In Bed  exit alarm on  -SS       User Key  (r) = Recorded By, (t) = Taken By, (c) = Cosigned By    Initials Name Provider Type    SS Jenna Enciso PT Physical Therapist        Outcome Measures    No documentation.       Physical Therapy Education                 Title: PT OT SLP Therapies (Done)     Topic: Physical Therapy (Done)     Point: Mobility training (Done)     Learning Progress Summary           Patient Acceptance, E, VU by  at 6/7/2021 1720                               User Key     Initials Effective Dates Name Provider Type Discipline     06/19/19 -  Jenna Enciso PT Physical Therapist PT              PT Recommendation and Plan  Planned Therapy Interventions (PT): balance training, bed mobility training, gait training, home exercise program, patient/family education, strengthening, transfer training  Plan of Care Reviewed With: patient  Outcome Summary: 64 y/o M who presented with HTN emergency, self suspected seizure. Pt with hx CVA with no deficits per report. Pt recently took himself off all medications. Pt is independent with ADLs and household mobility. Pt reports he is 90% visually impaired and does not drive. Gets help from his sons when needed. His son lived with him until recently. Patient denies any falls in the last year. Upon start of session BP was 168/91 in sitting. In standing, 153/80. After ambulation 170/81. Patient req SBA for bed mobility, CGA for transfers and up to min A for ambulation. Pts postural stability worsened, gait speed decreased and response time slowed half way through ambulation. Pt with multiple stops while ambulating without explanation, cues to continue. No overt losses of balance. During ambulation, was concerned about orthostatic hypotension but was not present. Could be related to visual deficits. Anticipate that pt will be safe to d/c home  once medically appropriate with HHPT and assist as needed from son.     Time Calculation:   PT Charges     Row Name 06/07/21 1720             Time Calculation    Start Time  1238  -      Stop Time  1300  -      Time Calculation (min)  22 min  -      PT Received On  06/07/21  -      PT - Next Appointment  06/09/21  -      PT Goal Re-Cert Due Date  06/21/21  -         Time Calculation- PT    Total Timed Code Minutes- PT  0 minute(s)  -        User Key  (r) = Recorded By, (t) = Taken By, (c) = Cosigned By    Initials Name Provider Type    SS Jenna Enciso, PT Physical Therapist        Therapy Charges for Today     Code Description Service Date Service Provider Modifiers Qty    69973540854 HC PT EVAL MOD COMPLEXITY 4 6/7/2021 Jenna Enciso, PT GP 1               Jenna Enciso, PT  6/7/2021

## 2021-06-07 NOTE — PROGRESS NOTES
"   LOS: 3 days    Patient Care Team:  Marizol Puentes MD as PCP - General (Family Medicine)      Subjective     Patient resting comfortably.  Denies any chest pain, shortness of breath, nausea or vomiting    Objective     Vital Sign Min/Max for last 24 hours  Temp:  [97.8 °F (36.6 °C)-98.1 °F (36.7 °C)] 98 °F (36.7 °C)  Heart Rate:  [69-85] 77  Resp:  [16-20] 18  BP: (149-184)/(62-90) 174/84                       Flowsheet Rows      First Filed Value   Admission Height  175.3 cm (69\") Documented at 06/04/2021 2120   Admission Weight  107 kg (234 lb 12.6 oz) Documented at 06/04/2021 2120          I/O this shift:  In: 50 [P.O.:50]  Out: -   I/O last 3 completed shifts:  In: 120 [P.O.:120]  Out: 2950 [Urine:2950]    Physical Exam:  Physical Exam    General.  Awake, alert  Head.  Atraumatic, normocephalic  Neck.  Supple.  No JVD  Respiratory.  Clear to auscultation bilaterally.  No wheezing or rhonchi  Cardiovascular.  Normal rate.  Regular rhythm.  S1, S2  Abdomen.  Soft, obese, nontender.  No organomegaly.  Bowel sounds present  Extremities.  No edema.  Pulses palpable  Neurological.  No focal deficit       LABS:  Lab Results   Component Value Date    CALCIUM 8.2 (L) 06/07/2021    PHOS 3.7 06/05/2021     Results from last 7 days   Lab Units 06/07/21  0234 06/06/21  0511 06/05/21  0001 06/05/21  0000   SODIUM mmol/L 136 138 136  --    POTASSIUM mmol/L 4.1 4.0 3.8  --    CHLORIDE mmol/L 107 107 104  --    CO2 mmol/L 19.0* 20.0* 21.0*  --    BUN mg/dL 24* 24* 27*  --    CREATININE mg/dL 1.94* 1.85* 2.33*  --    GLUCOSE mg/dL 137* 144* 219*  --    CALCIUM mg/dL 8.2* 7.8* 8.0*  --    WBC 10*3/mm3  --  10.00  --  11.00*   HEMOGLOBIN g/dL  --  12.0*  --  11.6*   PLATELETS 10*3/mm3  --  179  --  202   ALT (SGPT) U/L  --  7  --   --    AST (SGOT) U/L  --  13  --   --      Lab Results   Component Value Date    CKTOTAL 253 08/23/2018    TROPONINT 0.366 (C) 06/05/2021     Estimated Creatinine Clearance: 46.4 mL/min (A) (by " C-G formula based on SCr of 1.94 mg/dL (H)).      Brief Urine Lab Results  (Last result in the past 365 days)      Color   Clarity   Blood   Leuk Est   Nitrite   Protein   CREAT   Urine HCG        06/05/21 1359             227.3       06/05/21 1359 Yellow Cloudy  Comment:  Result checked  Moderate (2+) Negative Negative >=300 mg/dL (3+)             WEIGHTS:     Wt Readings from Last 1 Encounters:   06/07/21 0500 110 kg (242 lb 8.1 oz)   06/06/21 0553 107 kg (235 lb 10.8 oz)   06/04/21 2120 107 kg (234 lb 12.6 oz)       amLODIPine, 10 mg, Oral, Daily  aspirin, 81 mg, Oral, Daily  atorvastatin, 40 mg, Oral, Daily  clopidogrel, 75 mg, Oral, Daily  docusate sodium, 100 mg, Oral, Daily  escitalopram, 10 mg, Oral, Daily  heparin (porcine), 5,000 Units, Subcutaneous, Q12H  hydrALAZINE, 50 mg, Oral, BID  insulin glargine, 10 Units, Subcutaneous, Nightly  insulin lispro, 0-14 Units, Subcutaneous, TID AC  insulin lispro, 5 Units, Subcutaneous, TID With Meals  levETIRAcetam, 500 mg, Oral, BID  levothyroxine, 25 mcg, Oral, Q AM  metoprolol succinate XL, 50 mg, Oral, Q24H  sodium chloride, 10 mL, Intravenous, Q12H  tamsulosin, 0.4 mg, Oral, Nightly           Assessment/Plan       1.Acute kidney injury on CKD 3  Creatinine relatively stable but patient seems to have underlying chronic kidney disease with significant proteinuria, secondary to diabetic glomerulosclerosis  Electrolytes, volume status okay    2.Hypertension  Blood pressure still running high  Adding ARB given proteinuria also.  May increase creatinine somewhat but will benefit in long-term  Will wean off hydralazine if blood pressure improves  Off Flomax due to dizziness    3.  Proteinuria  Secondary to diabetic glomerulosclerosis  Starting ARB    4.Coronary artery disease  Cardiology is evaluating      Sherman Will MD  06/07/21  10:27 EDT

## 2021-06-08 PROBLEM — R94.39 ABNORMAL NUCLEAR STRESS TEST: Status: ACTIVE | Noted: 2021-06-04

## 2021-06-08 LAB
ALBUMIN SERPL-MCNC: 2.7 G/DL (ref 3.5–5.2)
ANION GAP SERPL CALCULATED.3IONS-SCNC: 12 MMOL/L (ref 5–15)
ANION GAP SERPL CALCULATED.3IONS-SCNC: 8 MMOL/L (ref 5–15)
BASOPHILS # BLD AUTO: 0 10*3/MM3 (ref 0–0.2)
BASOPHILS NFR BLD AUTO: 0.2 % (ref 0–1.5)
BUN SERPL-MCNC: 22 MG/DL (ref 8–23)
BUN SERPL-MCNC: 22 MG/DL (ref 8–23)
BUN/CREAT SERPL: 12.5 (ref 7–25)
BUN/CREAT SERPL: 13.3 (ref 7–25)
CALCIUM SPEC-SCNC: 8.2 MG/DL (ref 8.6–10.5)
CALCIUM SPEC-SCNC: 8.3 MG/DL (ref 8.6–10.5)
CHLORIDE SERPL-SCNC: 106 MMOL/L (ref 98–107)
CHLORIDE SERPL-SCNC: 106 MMOL/L (ref 98–107)
CO2 SERPL-SCNC: 18 MMOL/L (ref 22–29)
CO2 SERPL-SCNC: 22 MMOL/L (ref 22–29)
CREAT SERPL-MCNC: 1.66 MG/DL (ref 0.76–1.27)
CREAT SERPL-MCNC: 1.76 MG/DL (ref 0.76–1.27)
DEPRECATED RDW RBC AUTO: 42.4 FL (ref 37–54)
DEPRECATED RDW RBC AUTO: 42.4 FL (ref 37–54)
EOSINOPHIL # BLD AUTO: 0.2 10*3/MM3 (ref 0–0.4)
EOSINOPHIL NFR BLD AUTO: 2.3 % (ref 0.3–6.2)
ERYTHROCYTE [DISTWIDTH] IN BLOOD BY AUTOMATED COUNT: 15 % (ref 12.3–15.4)
ERYTHROCYTE [DISTWIDTH] IN BLOOD BY AUTOMATED COUNT: 15.1 % (ref 12.3–15.4)
GFR SERPL CREATININE-BSD FRML MDRD: 39 ML/MIN/1.73
GFR SERPL CREATININE-BSD FRML MDRD: 42 ML/MIN/1.73
GLUCOSE BLDC GLUCOMTR-MCNC: 107 MG/DL (ref 70–105)
GLUCOSE BLDC GLUCOMTR-MCNC: 143 MG/DL (ref 70–105)
GLUCOSE BLDC GLUCOMTR-MCNC: 233 MG/DL (ref 70–105)
GLUCOSE BLDC GLUCOMTR-MCNC: 98 MG/DL (ref 70–105)
GLUCOSE SERPL-MCNC: 143 MG/DL (ref 65–99)
GLUCOSE SERPL-MCNC: 98 MG/DL (ref 65–99)
HCT VFR BLD AUTO: 36.5 % (ref 37.5–51)
HCT VFR BLD AUTO: 37.4 % (ref 37.5–51)
HGB BLD-MCNC: 12.5 G/DL (ref 13–17.7)
HGB BLD-MCNC: 12.9 G/DL (ref 13–17.7)
LYMPHOCYTES # BLD AUTO: 1.8 10*3/MM3 (ref 0.7–3.1)
LYMPHOCYTES NFR BLD AUTO: 18.3 % (ref 19.6–45.3)
MCH RBC QN AUTO: 27.7 PG (ref 26.6–33)
MCH RBC QN AUTO: 27.8 PG (ref 26.6–33)
MCHC RBC AUTO-ENTMCNC: 34.4 G/DL (ref 31.5–35.7)
MCHC RBC AUTO-ENTMCNC: 34.4 G/DL (ref 31.5–35.7)
MCV RBC AUTO: 80.4 FL (ref 79–97)
MCV RBC AUTO: 80.8 FL (ref 79–97)
MONOCYTES # BLD AUTO: 0.6 10*3/MM3 (ref 0.1–0.9)
MONOCYTES NFR BLD AUTO: 5.6 % (ref 5–12)
NEUTROPHILS NFR BLD AUTO: 7.4 10*3/MM3 (ref 1.7–7)
NEUTROPHILS NFR BLD AUTO: 73.6 % (ref 42.7–76)
NRBC BLD AUTO-RTO: 0.1 /100 WBC (ref 0–0.2)
PHOSPHATE SERPL-MCNC: 3.8 MG/DL (ref 2.5–4.5)
PLATELET # BLD AUTO: 190 10*3/MM3 (ref 140–450)
PLATELET # BLD AUTO: 222 10*3/MM3 (ref 140–450)
PMV BLD AUTO: 9.3 FL (ref 6–12)
PMV BLD AUTO: 9.6 FL (ref 6–12)
POTASSIUM SERPL-SCNC: 4.2 MMOL/L (ref 3.5–5.2)
POTASSIUM SERPL-SCNC: 4.5 MMOL/L (ref 3.5–5.2)
RBC # BLD AUTO: 4.52 10*6/MM3 (ref 4.14–5.8)
RBC # BLD AUTO: 4.65 10*6/MM3 (ref 4.14–5.8)
SODIUM SERPL-SCNC: 136 MMOL/L (ref 136–145)
SODIUM SERPL-SCNC: 136 MMOL/L (ref 136–145)
WBC # BLD AUTO: 10.1 10*3/MM3 (ref 3.4–10.8)
WBC # BLD AUTO: 11.2 10*3/MM3 (ref 3.4–10.8)

## 2021-06-08 PROCEDURE — 63710000001 INSULIN GLARGINE PER 5 UNITS: Performed by: INTERNAL MEDICINE

## 2021-06-08 PROCEDURE — 63710000001 INSULIN LISPRO (HUMAN) PER 5 UNITS: Performed by: INTERNAL MEDICINE

## 2021-06-08 PROCEDURE — 99232 SBSQ HOSP IP/OBS MODERATE 35: CPT | Performed by: HOSPITALIST

## 2021-06-08 PROCEDURE — 25010000002 HYDRALAZINE PER 20 MG: Performed by: INTERNAL MEDICINE

## 2021-06-08 PROCEDURE — 25010000002 HEPARIN (PORCINE) PER 1000 UNITS: Performed by: INTERNAL MEDICINE

## 2021-06-08 PROCEDURE — 99232 SBSQ HOSP IP/OBS MODERATE 35: CPT | Performed by: INTERNAL MEDICINE

## 2021-06-08 PROCEDURE — 82962 GLUCOSE BLOOD TEST: CPT

## 2021-06-08 PROCEDURE — 85027 COMPLETE CBC AUTOMATED: CPT | Performed by: INTERNAL MEDICINE

## 2021-06-08 PROCEDURE — 80069 RENAL FUNCTION PANEL: CPT | Performed by: INTERNAL MEDICINE

## 2021-06-08 PROCEDURE — 85025 COMPLETE CBC W/AUTO DIFF WBC: CPT | Performed by: INTERNAL MEDICINE

## 2021-06-08 PROCEDURE — 80048 BASIC METABOLIC PNL TOTAL CA: CPT | Performed by: INTERNAL MEDICINE

## 2021-06-08 RX ADMIN — INSULIN LISPRO 5 UNITS: 100 INJECTION, SOLUTION INTRAVENOUS; SUBCUTANEOUS at 13:36

## 2021-06-08 RX ADMIN — METOPROLOL SUCCINATE 50 MG: 50 TABLET, EXTENDED RELEASE ORAL at 09:18

## 2021-06-08 RX ADMIN — AMLODIPINE BESYLATE 10 MG: 5 TABLET ORAL at 09:18

## 2021-06-08 RX ADMIN — CLOPIDOGREL BISULFATE 75 MG: 75 TABLET ORAL at 09:17

## 2021-06-08 RX ADMIN — INSULIN LISPRO 5 UNITS: 100 INJECTION, SOLUTION INTRAVENOUS; SUBCUTANEOUS at 09:18

## 2021-06-08 RX ADMIN — HYDRALAZINE HYDROCHLORIDE 50 MG: 25 TABLET, FILM COATED ORAL at 20:11

## 2021-06-08 RX ADMIN — ASPIRIN 81 MG: 81 TABLET, COATED ORAL at 09:17

## 2021-06-08 RX ADMIN — ATORVASTATIN CALCIUM 40 MG: 40 TABLET, FILM COATED ORAL at 09:18

## 2021-06-08 RX ADMIN — DOCUSATE SODIUM 100 MG: 100 CAPSULE, LIQUID FILLED ORAL at 09:18

## 2021-06-08 RX ADMIN — ESCITALOPRAM OXALATE 10 MG: 10 TABLET ORAL at 09:18

## 2021-06-08 RX ADMIN — Medication 10 ML: at 09:17

## 2021-06-08 RX ADMIN — LEVETIRACETAM 500 MG: 500 TABLET ORAL at 20:12

## 2021-06-08 RX ADMIN — HEPARIN SODIUM 5000 UNITS: 5000 INJECTION INTRAVENOUS; SUBCUTANEOUS at 09:18

## 2021-06-08 RX ADMIN — TAMSULOSIN HYDROCHLORIDE 0.4 MG: 0.4 CAPSULE ORAL at 20:10

## 2021-06-08 RX ADMIN — HEPARIN SODIUM 5000 UNITS: 5000 INJECTION INTRAVENOUS; SUBCUTANEOUS at 20:12

## 2021-06-08 RX ADMIN — Medication 10 ML: at 20:12

## 2021-06-08 RX ADMIN — LEVETIRACETAM 500 MG: 500 TABLET ORAL at 09:17

## 2021-06-08 RX ADMIN — LEVOTHYROXINE SODIUM 25 MCG: 0.03 TABLET ORAL at 06:05

## 2021-06-08 RX ADMIN — HYDRALAZINE HYDROCHLORIDE 50 MG: 25 TABLET, FILM COATED ORAL at 09:17

## 2021-06-08 RX ADMIN — HYDRALAZINE HYDROCHLORIDE 10 MG: 20 INJECTION INTRAMUSCULAR; INTRAVENOUS at 02:38

## 2021-06-08 RX ADMIN — LOSARTAN POTASSIUM 50 MG: 50 TABLET, FILM COATED ORAL at 09:18

## 2021-06-08 RX ADMIN — INSULIN LISPRO 5 UNITS: 100 INJECTION, SOLUTION INTRAVENOUS; SUBCUTANEOUS at 18:31

## 2021-06-08 RX ADMIN — INSULIN GLARGINE 10 UNITS: 100 INJECTION, SOLUTION SUBCUTANEOUS at 18:31

## 2021-06-08 NOTE — DISCHARGE PLACEMENT REQUEST
"**Please contact to screen for eligibility for Medicaid Waiver services.    MERCEDES Zarate, LSW    Office: [phone]  Cell: [phone]  Fax: [phone]  E-mail: [email or web address]**    Isaias Holly (65 y.o. Male)     Date of Birth Social Security Number Address Home Phone MRN    1955  [address] [facility] [place] [address] [phone] [id number]    Anabaptist Marital Status          Orthodoxy        Admission Date Admission Type Admitting Provider Attending Provider Department, Room/Bed    6/4/21 Urgent  Caty Marroquin MD [facility], 2119/1    Discharge Date Discharge Disposition Discharge Destination                       Attending Provider: Caty Marroquin MD    Allergies: Shellfish Allergy    Isolation: None   Infection: None   Code Status: CPR    Ht: 175.3 cm (69\")   Wt: 107 kg (235 lb 7.2 oz)    Admission Cmt: None   Principal Problem: Acute kidney injury superimposed on chronic kidney disease (CMS/HCC) [N17.9,N18.9]                 Active Insurance as of 6/4/2021     Primary Coverage     Payor Plan Insurance Group Employer/Plan Group    MEDICARE MEDICARE A & B      Payor Plan Address Payor Plan Phone Number Payor Plan Fax Number Effective Dates    [address] [phone]  6/1/2021 - None Entered    [facility] [address]       Subscriber Name Subscriber Birth Date Member ID       ISAIAS HOLLY P 1955 [id number]           Secondary Coverage     Payor Plan Insurance Group Employer/Plan Group    [place]      Payor Plan Address Payor Plan Phone Number Payor Plan Fax Number Effective Dates    [address]   4/1/2021 - None Entered    [place] [address]       Subscriber Name Subscriber Birth Date Member ID       ISAIAS HOLLY P 1955 [id number]                 Emergency Contacts      (Rel.) Home Phone Work Phone Mobile Phone    EMERY HOLLY (Son) -- -- [phone]    " KATARZYNA HOLLY (Son) -- -- [phone]            Insurance Information                MEDICARE/MEDICARE A & B Phone: [phone]    Subscriber: Isaias Holly Subscriber#: [id number]    Group#:  Precert#:         [place] Phone:     Subscriber: Isaias Holly Subscriber#: [id number]    Group#:  Precert#:

## 2021-06-08 NOTE — PROGRESS NOTES
CARDIOLOGY FOLLOW-UP PROGRESS NOTE      Reason for follow-up:    Elevated troponin    -1.0 at outlying facility, 0.233 here  CAD with prior PCI to LCx, RCA 2011  Uncontrolled HTN:  Pt has stopped all antihypertensive agents for last month  DM  Dyslipidemia  Hx Seizure disorder with possible Seizure  Non compliance with medical therapy     Attending: Caty Marroquin MD      SUBJECTIVE:    No chest pain or shortness of breath.  Lying in bed comfortably     Review of Systems   General: denies fever, chills, anorexia, weight loss  Eyes: denies blurring, diplopia  Ear/Nose/Throat: denies ear pain, nosebleeds, hoarseness  Cardiovascular: See HPI  Respiratory: denies excessive sputum, hemoptysis, wheezing  Gastrointestinal: denies nausea, vomiting, change in bowel habits, abdominal pain  Genitourinary: denies dysuria and hematuria  Musculoskeletal: denies back pain, joint pain, joint swelling, muscle cramps, weakness  Skin: denies rashes, itching, suspicious lesions  Neurologic: denies focal neuro deficits  Psychiatric: denies depression, anxiety  Endocrine: denies cold intolerance, heat intolerance  Hematologic/Lymphatic: denies abnormal bruising, bleeding  Allergic/Immunologic: denies urticaria or persistent infections      Allergies: Shellfish allergy    Scheduled Meds:amLODIPine, 10 mg, Oral, Daily  aspirin, 81 mg, Oral, Daily  atorvastatin, 40 mg, Oral, Daily  clopidogrel, 75 mg, Oral, Daily  docusate sodium, 100 mg, Oral, Daily  escitalopram, 10 mg, Oral, Daily  heparin (porcine), 5,000 Units, Subcutaneous, Q12H  hydrALAZINE, 50 mg, Oral, BID  insulin glargine, 10 Units, Subcutaneous, Daily With Dinner  insulin lispro, 0-14 Units, Subcutaneous, TID AC  insulin lispro, 5 Units, Subcutaneous, TID With Meals  levETIRAcetam, 500 mg, Oral, BID  levothyroxine, 25 mcg, Oral, Q AM  losartan, 50 mg, Oral, Q24H  metoprolol succinate XL, 50 mg, Oral, Q24H  sodium chloride, 10 mL, Intravenous, Q12H  tamsulosin, 0.4 mg, Oral,  Nightly        Continuous Infusions:     PRN Meds:.•  acetaminophen  •  dextrose  •  dextrose  •  glucagon (human recombinant)  •  hydrALAZINE  •  insulin lispro **AND** insulin lispro  •  ondansetron  •  sodium chloride    Objective     Vital Signs  Vitals:    06/07/21 2252 06/08/21 0228 06/08/21 0559 06/08/21 1053   BP: 159/74 174/80 152/78 124/61   BP Location: Left arm Left arm Right leg    Patient Position: Lying Lying Lying    Pulse: 75 77 84 78   Resp: 16 16 18 18   Temp: 98.4 °F (36.9 °C) 98.3 °F (36.8 °C) 98.1 °F (36.7 °C) 98.4 °F (36.9 °C)   TempSrc: Oral Oral Oral Oral   SpO2: 94% 97% 97%    Weight:   107 kg (235 lb 7.2 oz)    Height:         Wt Readings from Last 1 Encounters:   06/08/21 107 kg (235 lb 7.2 oz)       Physical Exam:     General Appearance:    Alert, oriented, no acute distress   Neck:  Supple without JVD or bruit   Lungs:    Grossly clear with adequate airflow bilaterally    Heart:    Regular rate and rhythm, normal S1 and S2, no            murmur, no gallop, no rub, no click   Chest Wall/Thorax:    No abnormalities observed   Abdomen:     Normal bowel sounds, no masses, no organomegaly, soft        non-tender, non-distended, no guarding, no rebound                tenderness   Extremities:  Range of motion adequate, no edema, no cyanosis, no             redness   Pulses:   Pulses palpable and equal bilaterally   Skin:   No bleeding, bruising or rash   Neurologic:  No focal deficits               Results Review:     CBC    Results from last 7 days   Lab Units 06/08/21  0250 06/06/21  0511 06/05/21  0000   WBC 10*3/mm3 11.20* 10.00 11.00*   HEMOGLOBIN g/dL 12.9* 12.0* 11.6*   PLATELETS 10*3/mm3 190 179 202     BMP   Results from last 7 days   Lab Units 06/08/21  0250 06/07/21  0234 06/06/21  0511 06/05/21  0001   SODIUM mmol/L 136 136 138 136   POTASSIUM mmol/L 4.2 4.1 4.0 3.8   CHLORIDE mmol/L 106 107 107 104   CO2 mmol/L 18.0* 19.0* 20.0* 21.0*   BUN mg/dL 22 24* 24* 27*   CREATININE mg/dL  1.66* 1.94* 1.85* 2.33*   GLUCOSE mg/dL 98 137* 144* 219*   PHOSPHORUS mg/dL 3.8  --   --  3.7     HbA1C   Lab Results   Component Value Date    HGBA1C 6.4 (H) 06/05/2021    HGBA1C 7.1 (H) 12/31/2019    HGBA1C 7.6 (H) 07/08/2019     CMP   Results from last 7 days   Lab Units 06/08/21  0250 06/07/21  0234 06/06/21  0511 06/05/21  0001   SODIUM mmol/L 136 136 138 136   POTASSIUM mmol/L 4.2 4.1 4.0 3.8   CHLORIDE mmol/L 106 107 107 104   CO2 mmol/L 18.0* 19.0* 20.0* 21.0*   BUN mg/dL 22 24* 24* 27*   CREATININE mg/dL 1.66* 1.94* 1.85* 2.33*   GLUCOSE mg/dL 98 137* 144* 219*   ALBUMIN g/dL 2.70*  --  2.80*  --    BILIRUBIN mg/dL  --   --  0.5  --    ALK PHOS U/L  --   --  102  --    AST (SGOT) U/L  --   --  13  --    ALT (SGPT) U/L  --   --  7  --      Radiology(recent) No radiology results for the last day    Cardiac Studies:  No new studies; telemetry shows sinus rhythm    Medication Review:   Scheduled Meds:amLODIPine, 10 mg, Oral, Daily  aspirin, 81 mg, Oral, Daily  atorvastatin, 40 mg, Oral, Daily  clopidogrel, 75 mg, Oral, Daily  docusate sodium, 100 mg, Oral, Daily  escitalopram, 10 mg, Oral, Daily  heparin (porcine), 5,000 Units, Subcutaneous, Q12H  hydrALAZINE, 50 mg, Oral, BID  insulin glargine, 10 Units, Subcutaneous, Daily With Dinner  insulin lispro, 0-14 Units, Subcutaneous, TID AC  insulin lispro, 5 Units, Subcutaneous, TID With Meals  levETIRAcetam, 500 mg, Oral, BID  levothyroxine, 25 mcg, Oral, Q AM  losartan, 50 mg, Oral, Q24H  metoprolol succinate XL, 50 mg, Oral, Q24H  sodium chloride, 10 mL, Intravenous, Q12H  tamsulosin, 0.4 mg, Oral, Nightly      Continuous Infusions:   PRN Meds:.•  acetaminophen  •  dextrose  •  dextrose  •  glucagon (human recombinant)  •  hydrALAZINE  •  insulin lispro **AND** insulin lispro  •  ondansetron  •  sodium chloride    Assessment:  levated troponin    -1.0 at outlying facility, 0.233 here  CAD with prior PCI to LCx, RCA 2011  Uncontrolled HTN:  Pt has stopped all  antihypertensive agents for last month  DM  Dyslipidemia  Hx Seizure disorder with possible Seizure  Non compliance with medical therapy  Combined hyperlipidemia      Plan:  Patient presented after a seizure  Patient did not have any chest pain but had elevated troponin consistent with is a non-STEMI or versus renal insufficiency  Patient had previous stent placement to the circumflex artery and RCA  Patient blood pressure very high and his medications have been adjusted  Patient sugar levels and lipid levels are followed by the primary care doctor  Patient is noncompliant with medications also.  Patient had a stress test which was abnormal  Patient will have a cardiac catheterization performed in the morning  Patient has renal insufficiency and hence he will be cleared by nephrologist for the procedure.  Discussed with patient about procedure risks and benefits  Patient is followed by nephrologist and his renal function has been stable    Eligio Roblse MD  06/08/21  13:26 EDT      This report was generated using the Dragon voice recognition system.

## 2021-06-08 NOTE — DISCHARGE PLACEMENT REQUEST
"Isaias Holly (65 y.o. Male)     Date of Birth Social Security Number Address Home Phone MRN    1955  [address] [facility] [place] [address] [phone] [id number]    Nondenominational Marital Status          Sabianism        Admission Date Admission Type Admitting Provider Attending Provider Department, Room/Bed    21 Urgent  Caty Marroquin MD [facility] PROGRESS CARE,     Discharge Date Discharge Disposition Discharge Destination                       Attending Provider: Caty Marroquin MD    Allergies: Shellfish Allergy    Isolation: None   Infection: None   Code Status: CPR    Ht: 175.3 cm (69\")   Wt: 107 kg (235 lb 7.2 oz)    Admission Cmt: None   Principal Problem: Acute kidney injury superimposed on chronic kidney disease (CMS/HCC) [N17.9,N18.9]                 Active Insurance as of 2021     Primary Coverage     Payor Plan Insurance Group Employer/Plan Group    MEDICARE MEDICARE A & B      Payor Plan Address Payor Plan Phone Number Payor Plan Fax Number Effective Dates    [address] [phone]  2021 - None Entered    [facility] [id number]       Subscriber Name Subscriber Birth Date Member ID       ISAIAS HOLLY P 1955 [id number]           Secondary Coverage     Payor Plan Insurance Group Employer/Plan Group    [place]      Payor Plan Address Payor Plan Phone Number Payor Plan Fax Number Effective Dates    [address]   2021 - None Entered    [place] [address]       Subscriber Name Subscriber Birth Date Member ID       ISAIAS HOLLY P 1955 [id number]                 Emergency Contacts      (Rel.) Home Phone Work Phone Mobile Phone    BRIANLAURENEDILBERTOH (Son) -- -- [phone]    KATARZYNA HOLLY (Son) -- -- [phone]               History & Physical      Swati Rivers MD at [facility]                [facility] Medicine Services      Patient Name: Isaias Palauren  : " "1955  MRN: [id number]  Primary Care Physician: Marizol Puentes MD  Date of admission: 6/4/2021    Patient Care Team:  Marizol Puentes MD as PCP - General (Family Medicine)          Subjective   History Present Illness     Chief Complaint:  \"Feeling bad and hypertensive\".    History of Present Illness  65-year-old  male with Hx of CAD-s/p 2 coronary stents and on aspirin and Plavix, HTN, HLP, CVA, uncontrolled IDDM with retinopathy, stage III CKD, depression, hypothyroidism, BPH, and seizure disorder, and is not compliant with medications.  He presented to the ED of [facility] today stating that he is not feeling well since the morning and his BP at home was 220.  He denied having chest pain, SOA, palpitation, headache, abdominal pain, nausea or vomiting or any other complaint.  He states that he did not take his blood pressure medications today and he stopped any of his medications on his own.    Emergency department course:  Afebrile, initial BP was 227/108, tachycardic, no acute distress.  Physical examination per ED physician was unremarkable.  Labs were significant for first troponin of 0.22 then it went up to 1.03, glucose 291, BUN 25, creatinine 2.07, GFR 33, WBC count 14.0.  Urine analysis showed proteinuria and glycosuria but negative for infection.  Head CT scan without contrast did not show any acute process.  EKG showed sinus tachycardia with no acute ST changes.  Patient blood pressure partially improved after placing nitroglycerin ointment and giving him 0.2 mg of clonidine.  Because of the hypotension and elevated troponin level ED physician requested to transfer to our hospital and it was accepted.    ROS  Please refer to HPI. All other systems were reviewed and were negative.     Personal History     Past Medical History:   Past Medical History:   Diagnosis Date   • Cellulitis 10/15/2013   • Encounter for screening for malignant neoplasm of prostate 12/4/2017 "   • Encounter for screening for other disorder 12/4/2017   • Need for prophylactic vaccination against Streptococcus pneumoniae (pneumococcus) 10/19/2018   • Onychomycosis 10/19/2018   • Pain in right shoulder 10/3/2018   • TIA (transient ischemic attack) 01/05/2020       Surgical History:      Past Surgical History:   Procedure Laterality Date   • CATARACT EXTRACTION WITH INTRAOCULAR LENS IMPLANT Bilateral    • CORONARY ANGIOPLASTY WITH STENT PLACEMENT  2011    PTCA - LCx & RCA with stent   • ROTATOR CUFF REPAIR Right    • TOTAL SHOULDER REVERSE ARTHROPLASTY Right     after fracture 2018           Family History: family history includes Cerebral aneurysm in his father; Diabetes type II in his brother; Nephrotic syndrome in his granddaughter and grandson; Peripheral vascular disease in his brother. Otherwise pertinent FHx was reviewed and unremarkable.     Social History:  reports that he has never smoked. He has never used smokeless tobacco. He reports that he does not drink alcohol and does not use drugs.      Medications:  Prior to Admission medications    Medication Sig Start Date End Date Taking? Authorizing Provider   amLODIPine (NORVASC) 5 MG tablet Take 1 tablet by mouth Daily. 4/29/20   Marizol Puentes MD   aspirin 81 MG EC tablet Take 81 mg by mouth Daily.    Provider, MD Hermelindo   benazepril (LOTENSIN) 40 MG tablet Take 1 tablet by mouth Daily. 4/29/20   Marizol Puentes MD   clopidogrel (PLAVIX) 75 MG tablet Take 1 tablet by mouth Daily. 2/3/20   Marizol Puentes MD   Cyanocobalamin (B-12 COMPLIANCE INJECTION) 1000 MCG/ML kit B-12 COMPLIANCE INJECTION 1000 MCG/ML KIT 12/4/17   ProviderHermelindo MD   docusate sodium (COLACE) 100 MG capsule Take 1 capsule by mouth Daily. 12/30/19   Marizol Puentes MD   escitalopram (LEXAPRO) 10 MG tablet TAKE ONE TABLET BY MOUTH EVERY DAY 9/9/20   Marizol Puentes MD   glucose blood (ACCU-CHEK ROLANDO) test strip 1 each by Other route  "Daily. Use as instructed 1/20/20   Marizol Puentes MD   hydroCHLOROthiazide (HYDRODIURIL) 25 MG tablet TAKE ONE TABLET BY MOUTH EVERY DAY 12/10/20   Marizol Puentes MD   Insulin Syringe-Needle U-100 (ULTICARE INSULIN SYRINGE) 31G X 5/16\" 0.5 ML misc ULTICARE INSULIN SYRINGE 31G X 5/16\" 0.5 ML 12/13/18   Provider, MD Hermelindo   ketoconazole (NIZORAL) 2 % cream KETOCONAZOLE 2 % CREA 10/19/18   Provider, MD Hermelindo   Lancets (ACCU-CHEK SOFT TOUCH) lancets Use with device and strips to check blood glucose 1/20/20   Marizol Puentes MD   levETIRAcetam (Keppra) 500 MG tablet Take 1 tablet by mouth 2 (Two) Times a Day. 4/29/20   Marizol Puentes MD   levothyroxine (SYNTHROID) 25 MCG tablet Take 1 tablet by mouth Daily. 1/20/20   Marizol Puentes MD   metFORMIN (GLUCOPHAGE) 500 MG tablet TAKE ONE TABLET BY MOUTH TWICE DAILY with meals 8/9/19   Marizol Puentes MD   NOVOLIN 70/30 (70-30) 100 UNIT/ML injection inject 50 units SUBCUTANEOUSLY EVERY MORNING AND 40 units SUBCUTANEOUSLY EVERY EVENING 8/17/20   Marizol Puentes MD   pravastatin (PRAVACHOL) 40 MG tablet Take 1 tablet by mouth Daily. 1/20/20   Marizol Puentes MD   tamsulosin (FLOMAX) 0.4 MG capsule 24 hr capsule TAKE ONE CAPSULE BY MOUTH TWICE DAILY 4/23/21   Marizol Puentes MD       Allergies:    Allergies   Allergen Reactions   • Shellfish Allergy Nausea And Vomiting       Objective   Objective     Vital Signs  Temp:  [97.6 °F (36.4 °C)-97.8 °F (36.6 °C)] 97.8 °F (36.6 °C)  Heart Rate:  [67-76] 71  Resp:  [18-20] 18  BP: (169-191)/() 169/85  SpO2:  [95 %-98 %] 97 %  on   ;   Device (Oxygen Therapy): room air  Body mass index is 34.67 kg/m².    Physical Exam  General: Obese, unkempt, alert and oriented x3, no acute distress.   Eyes:  Show anicteric sclerae, moist conjunctivae with no lig lag; PERRLA.  HENT:  Normocephalic, atraumatic, moist oral mucosa.  Neck: Short and thick, no bruit, no JVP, " no thyroid or lymph node enlargement, trachea central,   Lungs:  Good air entry. Clear to auscultation.   Heart: RRR, no murmur or rub.   Abdomen:  Soft, not tender, not distended, no organomegaly, bowel sounds positive.   Extremities: No leg edema or joint swelling, no calf tenderness, normal range of movement, pedal pulses intact.   Skin: No rash, lesions, or ulcers.  Normal texture and turgor.  Neurology:  Grossly intact.   Psychiatric exam: Pleasant, cooperative, appropriate mood and affect, intact judgment and insight.    Results Review:  I have personally reviewed most recent cardiac tracings, lab results and radiology images and interpretations and agree with findings, most notably: As below.              Invalid input(s):  ALKPHOS  CrCl cannot be calculated (Patient's most recent lab result is older than the maximum 30 days allowed.).  Brief Urine Lab Results     None          Microbiology Results (last 10 days)     ** No results found for the last 240 hours. **          ECG/EMG Results (most recent)     None              Results for orders placed in visit on 09/24/19    SCANNED - ECHOCARDIOGRAM      No radiology results for the last 7 days      CrCl cannot be calculated (Patient's most recent lab result is older than the maximum 30 days allowed.).    Assessment/Plan   Assessment/Plan       Assessment/plan:  1.  Hypertensive emergency.  -Increase the dose of amlodipine to 10 mg daily, add metoprolol 50 mg twice daily, IV hydralazine every 6 hours as needed.  -Low-salt diet.    2.  DYLON on stage III CKD.  -Consult nephrology.  -Gentle hydration with normal saline.  -Hold ACE inhibitors and diuretics.  Avoid nephrotoxic medications.  Monitor renal function.  -Check serum phosphorus and PTH levels.    3.  Elevated troponin level-likely 2/2 #1 and 2.  -Consult cardiology.  -Trend troponin level.    4.  Leukocytosis-likely reactive.    5.  Coronary artery disease-s/p 2 coronary stents.  -Continue aspirin and  Plavix.    6.  Uncontrolled IDDM with retinopathy 2/2 noncompliance with medications.  -Start patient on basal, prandial, and correctional insulins.  -Hold Metformin.  -Check HbA1c level.  -Diabetic diet.    7.  Hyperlipidemia-on statin.  -Check fasting lipid panel.    8.  Hypothyroidism-stopped taking thyroid hormone.  -Check free T4 level and based on the result will order levothyroxine if needed.    9.  Hx of CVA with no residual deficits.    10.  Seizure disorder-on Keppra.    11.  Depression-stopped taking medications.    12.  BPH-on Flomax.          VTE Prophylaxis -   Mechanical Order History:     None      Pharmalogical Order History:      Ordered     Dose Route Frequency Stop    06/04/21 2311  heparin (porcine) 5000 UNIT/ML injection 5,000 Units      5,000 Units SC Every 8 Hours Scheduled --                CODE STATUS:    Code Status and Medical Interventions:   Ordered at: 06/04/21 2311     Code Status:    CPR     Medical Interventions (Level of Support Prior to Arrest):    Full       This patient has been examined wearing appropriate Personal Protective Equipment and discussed with hospital infection control department. 06/04/21      I discussed the patient's findings and my recommendations with patient.      Signature:Electronically signed by Swati Rivers MD, 06/04/21, 11:27 PM EDT.      [facility] Hospitalist Team    Electronically signed by Swati Rivers MD at 06/04/21 2335       Referral Orders (last 24 hours) (24h ago, onward)     Start     Ordered    06/08/21 0000  Ambulatory Referral to Home Health     Question Answer Comment   Face to Face Visit Date: 6/8/2021    Follow-up provider for Plan of Care? I treated the patient in an acute care facility and will not continue treatment after discharge.    Follow-up provider: JUAN ALVAREZ    Reason/Clinical Findings Post-hospital evaluation    Describe mobility limitations that make leaving home difficult: Tires easily    Nursing/Therapeutic  Services Requested Other HH to evaluate   Frequency: 1 Week 1        06/08/21 1459                   Physician Progress Notes (most recent note)      Eligio Robles MD at 06/08/21 1326          CARDIOLOGY FOLLOW-UP PROGRESS NOTE      Reason for follow-up:    Elevated troponin    -1.0 at outlying facility, 0.233 here  CAD with prior PCI to LCx, RCA 2011  Uncontrolled HTN:  Pt has stopped all antihypertensive agents for last month  DM  Dyslipidemia  Hx Seizure disorder with possible Seizure  Non compliance with medical therapy     Attending: Caty Marroquin MD      SUBJECTIVE:    No chest pain or shortness of breath.  Lying in bed comfortably     Review of Systems   General: denies fever, chills, anorexia, weight loss  Eyes: denies blurring, diplopia  Ear/Nose/Throat: denies ear pain, nosebleeds, hoarseness  Cardiovascular: See HPI  Respiratory: denies excessive sputum, hemoptysis, wheezing  Gastrointestinal: denies nausea, vomiting, change in bowel habits, abdominal pain  Genitourinary: denies dysuria and hematuria  Musculoskeletal: denies back pain, joint pain, joint swelling, muscle cramps, weakness  Skin: denies rashes, itching, suspicious lesions  Neurologic: denies focal neuro deficits  Psychiatric: denies depression, anxiety  Endocrine: denies cold intolerance, heat intolerance  Hematologic/Lymphatic: denies abnormal bruising, bleeding  Allergic/Immunologic: denies urticaria or persistent infections      Allergies: Shellfish allergy    Scheduled Meds:amLODIPine, 10 mg, Oral, Daily  aspirin, 81 mg, Oral, Daily  atorvastatin, 40 mg, Oral, Daily  clopidogrel, 75 mg, Oral, Daily  docusate sodium, 100 mg, Oral, Daily  escitalopram, 10 mg, Oral, Daily  heparin (porcine), 5,000 Units, Subcutaneous, Q12H  hydrALAZINE, 50 mg, Oral, BID  insulin glargine, 10 Units, Subcutaneous, Daily With Dinner  insulin lispro, 0-14 Units, Subcutaneous, TID AC  insulin lispro, 5 Units, Subcutaneous, TID With Meals  levETIRAcetam, 500  mg, Oral, BID  levothyroxine, 25 mcg, Oral, Q AM  losartan, 50 mg, Oral, Q24H  metoprolol succinate XL, 50 mg, Oral, Q24H  sodium chloride, 10 mL, Intravenous, Q12H  tamsulosin, 0.4 mg, Oral, Nightly        Continuous Infusions:     PRN Meds:.•  acetaminophen  •  dextrose  •  dextrose  •  glucagon (human recombinant)  •  hydrALAZINE  •  insulin lispro **AND** insulin lispro  •  ondansetron  •  sodium chloride    Objective     Vital Signs  Vitals:    06/07/21 2252 06/08/21 0228 06/08/21 0559 06/08/21 1053   BP: 159/74 174/80 152/78 124/61   BP Location: Left arm Left arm Right leg    Patient Position: Lying Lying Lying    Pulse: 75 77 84 78   Resp: 16 16 18 18   Temp: 98.4 °F (36.9 °C) 98.3 °F (36.8 °C) 98.1 °F (36.7 °C) 98.4 °F (36.9 °C)   TempSrc: Oral Oral Oral Oral   SpO2: 94% 97% 97%    Weight:   107 kg (235 lb 7.2 oz)    Height:         Wt Readings from Last 1 Encounters:   06/08/21 107 kg (235 lb 7.2 oz)       Physical Exam:     General Appearance:    Alert, oriented, no acute distress   Neck:  Supple without JVD or bruit   Lungs:    Grossly clear with adequate airflow bilaterally    Heart:    Regular rate and rhythm, normal S1 and S2, no            murmur, no gallop, no rub, no click   Chest Wall/Thorax:    No abnormalities observed   Abdomen:     Normal bowel sounds, no masses, no organomegaly, soft        non-tender, non-distended, no guarding, no rebound                tenderness   Extremities:  Range of motion adequate, no edema, no cyanosis, no             redness   Pulses:   Pulses palpable and equal bilaterally   Skin:   No bleeding, bruising or rash   Neurologic:  No focal deficits               Results Review:     CBC    Results from last 7 days   Lab Units 06/08/21  0250 06/06/21  0511 06/05/21  0000   WBC 10*3/mm3 11.20* 10.00 11.00*   HEMOGLOBIN g/dL 12.9* 12.0* 11.6*   PLATELETS 10*3/mm3 190 179 202     BMP   Results from last 7 days   Lab Units 06/08/21  0250 06/07/21  0234 06/06/21  0511  06/05/21  0001   SODIUM mmol/L 136 136 138 136   POTASSIUM mmol/L 4.2 4.1 4.0 3.8   CHLORIDE mmol/L 106 107 107 104   CO2 mmol/L 18.0* 19.0* 20.0* 21.0*   BUN mg/dL 22 24* 24* 27*   CREATININE mg/dL 1.66* 1.94* 1.85* 2.33*   GLUCOSE mg/dL 98 137* 144* 219*   PHOSPHORUS mg/dL 3.8  --   --  3.7     HbA1C   Lab Results   Component Value Date    HGBA1C 6.4 (H) 06/05/2021    HGBA1C 7.1 (H) 12/31/2019    HGBA1C 7.6 (H) 07/08/2019     CMP   Results from last 7 days   Lab Units 06/08/21  0250 06/07/21  0234 06/06/21  0511 06/05/21  0001   SODIUM mmol/L 136 136 138 136   POTASSIUM mmol/L 4.2 4.1 4.0 3.8   CHLORIDE mmol/L 106 107 107 104   CO2 mmol/L 18.0* 19.0* 20.0* 21.0*   BUN mg/dL 22 24* 24* 27*   CREATININE mg/dL 1.66* 1.94* 1.85* 2.33*   GLUCOSE mg/dL 98 137* 144* 219*   ALBUMIN g/dL 2.70*  --  2.80*  --    BILIRUBIN mg/dL  --   --  0.5  --    ALK PHOS U/L  --   --  102  --    AST (SGOT) U/L  --   --  13  --    ALT (SGPT) U/L  --   --  7  --      Radiology(recent) No radiology results for the last day    Cardiac Studies:  No new studies; telemetry shows sinus rhythm    Medication Review:   Scheduled Meds:amLODIPine, 10 mg, Oral, Daily  aspirin, 81 mg, Oral, Daily  atorvastatin, 40 mg, Oral, Daily  clopidogrel, 75 mg, Oral, Daily  docusate sodium, 100 mg, Oral, Daily  escitalopram, 10 mg, Oral, Daily  heparin (porcine), 5,000 Units, Subcutaneous, Q12H  hydrALAZINE, 50 mg, Oral, BID  insulin glargine, 10 Units, Subcutaneous, Daily With Dinner  insulin lispro, 0-14 Units, Subcutaneous, TID AC  insulin lispro, 5 Units, Subcutaneous, TID With Meals  levETIRAcetam, 500 mg, Oral, BID  levothyroxine, 25 mcg, Oral, Q AM  losartan, 50 mg, Oral, Q24H  metoprolol succinate XL, 50 mg, Oral, Q24H  sodium chloride, 10 mL, Intravenous, Q12H  tamsulosin, 0.4 mg, Oral, Nightly      Continuous Infusions:   PRN Meds:.•  acetaminophen  •  dextrose  •  dextrose  •  glucagon (human recombinant)  •  hydrALAZINE  •  insulin lispro **AND**  insulin lispro  •  ondansetron  •  sodium chloride    Assessment:  levated troponin    -1.0 at outlying facility, 0.233 here  CAD with prior PCI to LCx, RCA   Uncontrolled HTN:  Pt has stopped all antihypertensive agents for last month  DM  Dyslipidemia  Hx Seizure disorder with possible Seizure  Non compliance with medical therapy  Combined hyperlipidemia      Plan:  Patient presented after a seizure  Patient did not have any chest pain but had elevated troponin consistent with is a non-STEMI or versus renal insufficiency  Patient had previous stent placement to the circumflex artery and RCA  Patient blood pressure very high and his medications have been adjusted  Patient sugar levels and lipid levels are followed by the primary care doctor  Patient is noncompliant with medications also.  Patient had a stress test which was abnormal  Patient will have a cardiac catheterization performed in the morning  Patient has renal insufficiency and hence he will be cleared by nephrologist for the procedure.  Discussed with patient about procedure risks and benefits  Patient is followed by nephrologist and his renal function has been stable    Eligio Robles MD  21  13:26 EDT      This report was generated using the Dragon voice recognition system.          Electronically signed by Eligio Robles MD at 21 1326          Physical Therapy Notes (most recent note)      Jenna Enciso PT at 21 1721  Version 1 of 1         Patient Name: Isaias Molina  : 1955    MRN: [id number]                              Today's Date: 2021       Admit Date: 2021    Visit Dx: No diagnosis found.  Patient Active Problem List   Diagnosis   • Abnormal gait   • Benign essential hypertension   • Cerebrovascular accident (CVA) (CMS/[facility])   • Coronary artery disease   • Homonymous hemianopia   • Mixed hyperlipidemia   • Obesity   • Resting tremor   • Tremor, essential   • Type 2 diabetes mellitus (CMS/HCC)   •  Vitamin B12 deficiency   • Episode of recurrent major depressive disorder (CMS/HCC)   • Chronic kidney insufficiency, stage 3 (moderate) (CMS/HCC)   • Elevated PSA   • Acquired hypothyroidism   • Seizure as late effect of cerebrovascular accident (CVA) (CMS/HCC)   • Diabetic retinopathy (CMS/[facility])   • Hypertensive emergency   • Acute kidney injury superimposed on chronic kidney disease (CMS/HCC)   • Elevated troponin level     Past Medical History:   Diagnosis Date   • Cellulitis 10/15/2013   • Encounter for screening for malignant neoplasm of prostate 12/4/2017   • Encounter for screening for other disorder 12/4/2017   • Need for prophylactic vaccination against Streptococcus pneumoniae (pneumococcus) 10/19/2018   • Onychomycosis 10/19/2018   • Pain in right shoulder 10/3/2018   • TIA (transient ischemic attack) 01/05/2020     Past Surgical History:   Procedure Laterality Date   • CATARACT EXTRACTION WITH INTRAOCULAR LENS IMPLANT Bilateral    • CORONARY ANGIOPLASTY WITH STENT PLACEMENT  2011    PTCA - LCx & RCA with stent   • ROTATOR CUFF REPAIR Right    • TOTAL SHOULDER REVERSE ARTHROPLASTY Right     after fracture 2018     General Information     Row Name 06/07/21 1241          Physical Therapy Time and Intention    Document Type  evaluation  -     Mode of Treatment  physical therapy  -     Row Name 06/07/21 1241          General Information    Patient Profile Reviewed  yes pt reports he is 90% visually impaired  -     Prior Level of Function  independent:;ADL's;dependent:;driving uses a STC PRN  -     Existing Precautions/Restrictions  fall;seizures  -     Row Name 06/07/21 1241          Living Environment    Lives With  alone [place], son lives with him some but he now has a place of his own since pt was fairly independent  -     Row Name 06/07/21 1241          Home Main Entrance    Number of Stairs, Main Entrance  one mobile home, hold onto door frame  -     Row Name 06/07/21 1241          Stairs  Within Home, Primary    Number of Stairs, Within Home, Primary  none  -     Row Name 06/07/21 1241          Cognition    Orientation Status (Cognition)  oriented x 4  -     Row Name 06/07/21 1710          Safety Issues, Functional Mobility    Impairments Affecting Function (Mobility)  balance;visual/perceptual;endurance/activity tolerance  -       User Key  (r) = Recorded By, (t) = Taken By, (c) = Cosigned By    Initials Name Provider Type    Jenna Rodriguez PT Physical Therapist        Mobility     Row Name 06/07/21 1710          Bed Mobility    Bed Mobility  supine-sit  -SS     Supine-Sit [place] (Bed Mobility)  supervision  -     Row Name 06/07/21 1710          Sit-Stand Transfer    Sit-Stand [place] (Transfers)  contact guard  -     Row Name 06/07/21 1710          Gait/Stairs (Locomotion)    [place] Level (Gait)  minimum assist (75% patient effort);contact guard  -SS     Distance in Feet (Gait)  40  -SS     Deviations/Abnormal Patterns (Gait)  gait speed decreased  -SS     Comment (Gait/Stairs)  once out of room, pts gait speed decreased and he became more forward flexed, his verbal response times were slowed and he reported that he was dizzy. He would stop ambulating and began to require increased assist. Was concerned about pt being orthostatic but once BP assessed upon return to supine, pt was still hypertensive.  -       User Key  (r) = Recorded By, (t) = Taken By, (c) = Cosigned By    Initials Name Provider Type    Jenna Rodriguez PT Physical Therapist        Obj/Interventions     Row Name 06/07/21 1713          Range of Motion Comprehensive    General Range of Motion  no range of motion deficits identified  -[facility] Name 06/07/21 1713          Strength Comprehensive (MMT)    General Manual Muscle Testing (MMT) Assessment  no strength deficits identified  -[facility] Name 06/07/21 1713          Balance    Balance Assessment  sitting static balance;standing  static balance;standing dynamic balance  -SS     Static Sitting Balance  WNL  -SS     Static Standing Balance  WNL  -SS     Dynamic Standing Balance  mild impairment  -SS     Row Name 06/07/21 1713          Sensory Assessment (Somatosensory)    Sensory Assessment (Somatosensory)  sensation intact states he has undiagnosed neuropathy but his sensation is intact to light touch  -SS       User Key  (r) = Recorded By, (t) = Taken By, (c) = Cosigned By    Initials Name Provider Type    SS Jenna Enciso PT Physical Therapist        Goals/Plan     Row Name 06/07/21 1719          Bed Mobility Goal 1 (PT)    Activity/Assistive Device (Bed Mobility Goal 1, PT)  bed mobility activities, all  -SS     [place] Level/Cues Needed (Bed Mobility Goal 1, PT)  modified independence  -SS     Time Frame (Bed Mobility Goal 1, PT)  long term goal (LTG);2 weeks  -[facility] Name 06/07/21 1719          Transfer Goal 1 (PT)    Activity/Assistive Device (Transfer Goal 1, PT)  transfers, all  -SS     [place] Level/Cues Needed (Transfer Goal 1, PT)  independent  -SS     Time Frame (Transfer Goal 1, PT)  long term goal (LTG);2 weeks  -[facility] Name 06/07/21 1719          Gait Training Goal 1 (PT)    Activity/Assistive Device (Gait Training Goal 1, PT)  gait (walking locomotion)  -SS     [place] Level (Gait Training Goal 1, PT)  independent  -SS     Distance (Gait Training Goal 1, PT)  75'  -SS     Time Frame (Gait Training Goal 1, PT)  long term goal (LTG);2 weeks  -       User Key  (r) = Recorded By, (t) = Taken By, (c) = Cosigned By    Initials Name Provider Type     Jenna Enciso PT Physical Therapist        Clinical Impression     Row Name 06/07/21 1713          Pain    Additional Documentation  Pain Scale: FACES Pre/Post-Treatment (Group)  -     Row Name 06/07/21 1713          Pain Scale: FACES Pre/Post-Treatment    Pain: FACES Scale, Pretreatment  0-->no hurt  -     Posttreatment Pain Rating  0-->no  hurt  -     Row Name 06/07/21 1713          Plan of Care Review    Plan of Care Reviewed With  patient  -     Outcome Summary  66 y/o M who presented with HTN emergency, self suspected seizure. Pt with hx CVA with no deficits per report. Pt recently took himself off all medications. Pt is independent with ADLs and household mobility. Pt reports he is 90% visually impaired and does not drive. Gets help from his sons when needed. His son lived with him until recently. Patient denies any falls in the last year. Upon start of session BP was 168/91 in sitting. In standing, 153/80. After ambulation 170/81. Patient req SBA for bed mobility, CGA for transfers and up to min A for ambulation. Pts postural stability worsened, gait speed decreased and response time slowed half way through ambulation. Pt with multiple stops while ambulating without explanation, cues to continue. No overt losses of balance. During ambulation, was concerned about orthostatic hypotension but was not present. Could be related to visual deficits. Anticipate that pt will be safe to d/c home once medically appropriate with HHPT and assist as needed from son.  -     Row Name 06/07/21 1713          Therapy Assessment/Plan (PT)    Rehab Potential (PT)  good, to achieve stated therapy goals  -     Criteria for Skilled Interventions Met (PT)  yes;meets criteria  -     Predicted Duration of Therapy Intervention (PT)  until d/c  -     Row Name 06/07/21 1713          Positioning and Restraints    Pre-Treatment Position  in bed  -SS     Post Treatment Position  bed  -SS     In Bed  exit alarm on  -       User Key  (r) = Recorded By, (t) = Taken By, (c) = Cosigned By    Initials Name Provider Type    Jenna Rodriguez, PT Physical Therapist        Outcome Measures    No documentation.       Physical Therapy Education                 Title: PT OT SLP Therapies (Done)     Topic: Physical Therapy (Done)     Point: Mobility training (Done)      Learning Progress Summary           Patient Acceptance, E, VU by  at 6/7/2021 1720                               User Key     Initials Effective Dates Name Provider Type Discipline     06/19/19 -  Jenna Enciso, PT Physical Therapist PT              PT Recommendation and Plan  Planned Therapy Interventions (PT): balance training, bed mobility training, gait training, home exercise program, patient/family education, strengthening, transfer training  Plan of Care Reviewed With: patient  Outcome Summary: 66 y/o M who presented with HTN emergency, self suspected seizure. Pt with hx CVA with no deficits per report. Pt recently took himself off all medications. Pt is independent with ADLs and household mobility. Pt reports he is 90% visually impaired and does not drive. Gets help from his sons when needed. His son lived with him until recently. Patient denies any falls in the last year. Upon start of session BP was 168/91 in sitting. In standing, 153/80. After ambulation 170/81. Patient req SBA for bed mobility, CGA for transfers and up to min A for ambulation. Pts postural stability worsened, gait speed decreased and response time slowed half way through ambulation. Pt with multiple stops while ambulating without explanation, cues to continue. No overt losses of balance. During ambulation, was concerned about orthostatic hypotension but was not present. Could be related to visual deficits. Anticipate that pt will be safe to d/c home once medically appropriate with HHPT and assist as needed from son.     Time Calculation:   PT Charges     Row Name 06/07/21 1720             Time Calculation    Start Time  1238  -      Stop Time  1300  -      Time Calculation (min)  22 min  -      PT Received On  06/07/21  -      PT - Next Appointment  06/09/21  -      PT Goal Re-Cert Due Date  06/21/21  -         Time Calculation- PT    Total Timed Code Minutes- PT  0 minute(s)  -        User Key  (r) = Recorded By,  (t) = Taken By, (c) = Cosigned By    Initials Name Provider Type    SS Jenna Enciso, PT Physical Therapist        Therapy Charges for Today     Code Description Service Date Service Provider Modifiers Qty    [id number]  PT EVAL MOD COMPLEXITY 4 6/7/2021 Jenna Enciso, PT GP 1               Jenna Enciso PT  6/7/2021      Electronically signed by Jenna Enciso, PT at 06/07/21 4388

## 2021-06-08 NOTE — CASE MANAGEMENT/SOCIAL WORK
Discharge Planning Assessment  [facility]     Patient Name: Isaias Molina  MRN: [id number]  Today's Date: 6/8/2021    Admit Date: 6/4/2021    Discharge Needs Assessment     Row Name 06/08/21 1711       Living Environment    Lives With  child(pranav), adult Son    Current Living Arrangements  home/apartment/condo    Primary Care Provided by  self    Provides Primary Care For  no one    Family Caregiver if Needed  child(pranav), adult    Quality of Family Relationships  supportive    Able to Return to Prior Arrangements  yes       Resource/Environmental Concerns    Resource/Environmental Concerns  none    Transportation Concerns  car, none       Transition Planning    Patient/Family Anticipates Transition to  home with help/services;home with family    Patient/Family Anticipated Services at Transition  home health care    Transportation Anticipated  family or friend will provide       Discharge Needs Assessment    Readmission Within the Last 30 Days  no previous admission in last 30 days    Equipment Currently Used at Home  none    Concerns to be Addressed  care coordination/care conferences;discharge planning    Anticipated Changes Related to Illness  none    Equipment Needed After Discharge  none    Provided Post Acute Provider List?  Yes    Post Acute Provider List  Home Health    Provided Post Acute Provider Quality & Resource List?  Yes    Post Acute Provider Quality and Resource List  Home Health    Delivered To  Patient    Method of Delivery  In person    Patient's Choice of Community Agency(s)  [facility]        Discharge Plan     Row Name 06/08/21 1712       Plan    Plan  [facility] and Medicaid Waiver Services sent. Anticipate routine home with son and HH.    Patient/Family in Agreement with Plan  yes    Plan Comments  CM met with patient at bedside to discuss dc planning and PT recommendation of HH. PCP confirmed (Marizol Puentes), patient reported that his son lives with him part of the  time. Reported that both of his sons work. Reported no trouble affording medications at this time. Preferred pharmacy confirmed ([facility]). CM contacted [facility] ([phone]) to confirm fax number to send new referral and that they are able to service [facility]. New referral and order sent through basket fax. CM discussed Medicaid services with patient who was agreeable. Notified MSW, see note for details regarding referral that was sent. CM inquired if patient uses transportation through his Medicaid. He reported he has but they forgot him once and the vans are “dirty.” He reported that his sons could probably provide his transportation at [place]. [place] Barriers: Nephrology following (creatinine 1.76), abnormal myoview results, scheduled to have cardiac catheterization performed tomorrow. Anticipate routine home with  once cleared by nephrology and cardiology.        Continued Care and Services - Admitted Since 6/4/2021     Home Medical Care     Service Provider Request Status Selected Services Address Phone Fax Patient Preferred    [facility]  Pending - Request Sent N/A [address] [facility] [place] [address] [phone] --                Demographic Summary     Row Name 06/08/21 1710       General Information    Admission Type  inpatient    Required Notices Provided  Important Message from Medicare IMM per registration on 6/5/21    Reason for Consult  discharge planning    Preferred Language  English     Used During This Interaction  no       Contact Information    Permission Granted to Share Info With          Functional Status     Row Name 06/08/21 1711       Functional Status    Usual Activity Tolerance  moderate    Current Activity Tolerance  moderate       Functional Status, IADL    Medications  independent    Meal Preparation  assistive person    Housekeeping  assistive person    Laundry  assistive person    Shopping  assistive  person        LACE: 13    Met with patient in room wearing PPE: mask/goggles.      Maintained distance greater than six feet and spent less than 15 minutes in the room.      Nayeli Sahu RN     Office Phone: [phone]  Office Cell: [phone]

## 2021-06-08 NOTE — PLAN OF CARE
Goal Outcome Evaluation:  Plan of Care Reviewed With: patient        Progress: improving   The patient's blood pressure is still elevated at times but less so than it has been. The patient's renal function labs have also improved this AM. Awaiting Margaret to go over the results of the Myoview with the patient to discuss what the plan of care will be, the patient currently appears to be resting comfortably, will continue to monitor.

## 2021-06-08 NOTE — PROGRESS NOTES
[facility] Medicine Services Daily Progress Note      Hospitalist Team  LOS 3 days      Patient Care Team:  Marizol Puentes MD as PCP - General (Family Medicine)    Patient Location: 2119/1      Subjective   Subjective     Chief Complaint / Subjective  F/u elevated bp and troponin     Brief Synopsis of Hospital Course/HPI  Isaias Molina is a 65 y.o. male who presents with patient [facility] through [facility] ED at [facility] in [place]. He reports that he was having uncontrolled blood pressure at home..      Patient has a history of coronary artery disease with previous PCI to the left circumflex and RCA in 2011.     The patient reports in the last month he has stopped many of his medications including antihypertensive agents, his statin and Plavix.     He reports that he was having periods of lightheadedness and dizziness and thought his blood pressure was running low so he stopped these medications.     He reports with Covid he has not been out to the doctor and missed several follow-ups.     He denies any exertional chest pain or dyspnea but reports yesterday he was feeling unwell which prompted his visit to the emergency room.     Yesterday he was unaware of having a seizure. However this morning he reports he thinks he had a seizure yesterday because his muscles feel sore and tight and he bit his tongue.     He denies any current chest pain  In the emergency room at [facility] it was reported that his troponin was 1.0       6/5- Patient denies any further chest pain. Reports he thinks he might have had a seizure yesterday due to muscle cramping today.     6/6- Patient denies any complaint.    6/7- Patient reports no specific complaints at this time.  He had multiple questions related to his current meds and plan of treatment and all questions were  answered.      Review of Systems   12 point review of systems was reviewed and was negative except as  "above.      Objective   Objective      Vital Signs  Temp:  [98 °F (36.7 °C)-99.3 °F (37.4 °C)] 99.3 °F (37.4 °C)  Heart Rate:  [70-85] 71  Resp:  [12-20] 16  BP: (153-184)/(62-90) 160/75  Oxygen Therapy  SpO2: 100 %  Device (Oxygen Therapy): room air  Flowsheet Rows      First Filed Value   Admission Height  175.3 cm (69\") Documented at 06/04/2021 2120   Admission Weight  107 kg (234 lb 12.6 oz) Documented at 06/04/2021 2120        Intake & Output (last 3 days)       06/05 0701 - 06/06 0700 06/06 0701 - 06/07 0700 06/07 0701 - 06/08 0700    P.O. 720 120 50    Total Intake(mL/kg) 720 (6.7) 120 (1.1) 50 (0.5)    Urine (mL/kg/hr) 1725 (0.7) 1525 (0.6)     Total Output 1725 1525     Net -1005 -1405 +50           Urine Unmeasured Occurrence   2 x    Stool Unmeasured Occurrence   1 x        Lines, Drains & Airways    Active LDAs     Name:   Placement date:   Placement time:   Site:   Days:    Peripheral IV 06/04/21  Posterior;Right Wrist   06/04/21    -- unknown    Wrist   1    Peripheral IV 06/05/21 0514 Left;Posterior Hand   06/05/21 0514    Hand   less than 1                  Physical Exam:    Physical Exam  Vital signs and nurses notes reviewed.  Well-developed well-nourished gentleman in no acute distress sitting up in bed awake and alert; mucous membranes moist; sclerae anicteric;lungs clear to auscultation bilaterally; CV regular rate and rhythm; abdomen soft nontender nondistended with active bowel sounds; extremities with no edema, cyanosis or calf tenderness; palpable pedal pulses bilaterally; no Flowers catheter.        Procedures:              Results Review:     I reviewed the patient's new clinical results.      Lab Results (last 24 hours)     Procedure Component Value Units Date/Time    POC Glucose Once [[id number]]  (Abnormal) Collected: 06/07/21 2024    Specimen: Blood Updated: 06/07/21 2025     Glucose 168 mg/dL      Comment: Serial Number: [id number]:  [id number]       POC Glucose Once [[id number]] "  (Abnormal) Collected: 06/07/21 1630    Specimen: Blood Updated: 06/07/21 1634     Glucose 116 mg/dL      Comment: Serial Number: [id number]:  [id number]       POC Glucose Once [[id number]]  (Abnormal) Collected: 06/07/21 1206    Specimen: Blood Updated: 06/07/21 1208     Glucose 141 mg/dL      Comment: Serial Number: [id number]:  [id number]       COVID PRE-OP / PRE-PROCEDURE SCREENING ORDER (NO ISOLATION) - Swab, Nasopharynx [[id number]]  (Normal) Collected: 06/07/21 0633    Specimen: Swab from Nasopharynx Updated: 06/07/21 0731    Narrative:      The following orders were created for panel order COVID PRE-OP / PRE-PROCEDURE SCREENING ORDER (NO ISOLATION) - Swab, Nasopharynx.  Procedure                               Abnormality         Status                     ---------                               -----------         ------                     COVID-19,CEPHEID,COR/ERVIN...[[id number]]  Normal              Final result                 Please view results for these tests on the individual orders.    COVID-19,CEPHEID,COR/ERVIN/PAD/YANIV IN-HOUSE(OR EMERGENT/ADD-ON),NP SWAB IN TRANSPORT MEDIA 3-4 HR TAT, RT-PCR - Swab, Nasopharynx [[id number]]  (Normal) Collected: 06/07/21 0633    Specimen: Swab from Nasopharynx Updated: 06/07/21 0731     COVID19 Not Detected    Narrative:      Fact sheet for providers: https://www.fda.gov/media/934028/download     Fact sheet for patients: https://www.fda.gov/media/092595/download  Fact sheet for providers: https://www.fda.gov/media/102631/download     Fact sheet for patients: https://www.fda.gov/media/146920/download    POC Glucose Once [[id number]]  (Abnormal) Collected: 06/07/21 0708    Specimen: Blood Updated: 06/07/21 0709     Glucose 163 mg/dL      Comment: Serial Number: [id number]:  [id number]       Basic Metabolic Panel [[id number]]  (Abnormal) Collected: 06/07/21 0234    Specimen: Blood Updated: 06/07/21 0322     Glucose 137 mg/dL      BUN 24 mg/dL      Creatinine  1.94 mg/dL      Sodium 136 mmol/L      Potassium 4.1 mmol/L      Chloride 107 mmol/L      CO2 19.0 mmol/L      Calcium 8.2 mg/dL      eGFR Non African Amer 35 mL/min/1.73      BUN/Creatinine Ratio 12.4     Anion Gap 10.0 mmol/L     Narrative:      GFR Normal >60  Chronic Kidney Disease <60  Kidney Failure <15          Hemoglobin A1C   Date Value Ref Range Status   06/05/2021 6.4 (H) 3.5 - 5.6 % Final               No results found for: LIPASE  Lab Results   Component Value Date    CHOL 166 06/05/2021    CHLPL 278 (H) 12/31/2019    TRIG 308 (H) 06/05/2021    HDL 22 (L) 06/05/2021    LDL 92 06/05/2021       No results found for: INTRAOP, PREDX, FINALDX, COMDX    Microbiology Results (last 10 days)     Procedure Component Value - Date/Time    COVID PRE-OP / PRE-PROCEDURE SCREENING ORDER (NO ISOLATION) - Swab, Nasopharynx [[id number]]  (Normal) Collected: 06/07/21 0633    Lab Status: Final result Specimen: Swab from Nasopharynx Updated: 06/07/21 0731    Narrative:      The following orders were created for panel order COVID PRE-OP / PRE-PROCEDURE SCREENING ORDER (NO ISOLATION) - Swab, Nasopharynx.  Procedure                               Abnormality         Status                     ---------                               -----------         ------                     COVID-19,CEPHEID,COR/ERVIN...[[id number]]  Normal              Final result                 Please view results for these tests on the individual orders.    COVID-19,CEPHEID,COR/ERVIN/PAD/YANIV IN-HOUSE(OR EMERGENT/ADD-ON),NP SWAB IN TRANSPORT MEDIA 3-4 HR TAT, RT-PCR - Swab, Nasopharynx [[id number]]  (Normal) Collected: 06/07/21 0633    Lab Status: Final result Specimen: Swab from Nasopharynx Updated: 06/07/21 0731     COVID19 Not Detected    Narrative:      Fact sheet for providers: https://www.fda.gov/media/526105/download     Fact sheet for patients: https://www.fda.gov/media/934226/download  Fact sheet for providers: https://www.fda.gov/media/471736/download      Fact sheet for patients: https://www.fda.gov/media/237285/download          ECG/EMG Results (most recent)     None              Results for orders placed in visit on 09/24/19    SCANNED - ECHOCARDIOGRAM      US Renal Bilateral    Result Date: 6/5/2021  1. No evidence of acute urinary obstruction. 2. Enlarged prostate. Electronically signed by:  Jesu Spicer M.D.  6/5/2021 6:34 PM          Xrays, labs reviewed personally by physician.    Medication Review:   I have reviewed the patient's current medication list      Scheduled Meds  amLODIPine, 10 mg, Oral, Daily  aspirin, 81 mg, Oral, Daily  atorvastatin, 40 mg, Oral, Daily  clopidogrel, 75 mg, Oral, Daily  docusate sodium, 100 mg, Oral, Daily  escitalopram, 10 mg, Oral, Daily  heparin (porcine), 5,000 Units, Subcutaneous, Q12H  hydrALAZINE, 50 mg, Oral, BID  [START ON 6/8/2021] insulin glargine, 10 Units, Subcutaneous, Daily With Dinner  insulin lispro, 0-14 Units, Subcutaneous, TID AC  insulin lispro, 5 Units, Subcutaneous, TID With Meals  levETIRAcetam, 500 mg, Oral, BID  levothyroxine, 25 mcg, Oral, Q AM  losartan, 50 mg, Oral, Q24H  metoprolol succinate XL, 50 mg, Oral, Q24H  sodium chloride, 10 mL, Intravenous, Q12H  tamsulosin, 0.4 mg, Oral, Nightly        Meds Infusions       Meds PRN  •  acetaminophen  •  dextrose  •  dextrose  •  glucagon (human recombinant)  •  hydrALAZINE  •  insulin lispro **AND** insulin lispro  •  ondansetron  •  sodium chloride        Assessment/Plan   Assessment/Plan     Active Hospital Problems    Diagnosis  POA   • **Acute kidney injury superimposed on chronic kidney disease (CMS/HCC) [N17.9, N18.9]  Yes   • Hypertensive emergency [I16.1]  Yes   • Elevated troponin level [R77.8]  Yes      Resolved Hospital Problems   No resolved problems to display.       MEDICAL DECISION MAKING COMPLEXITY BY PROBLEM:     Hypertensive emergency  - patient reports noncompliance with home meds due to reported hypotension   - continue amlodipine  10 mg, metoprolol 50 mg bid  - IV hydralazine prn  - holding benazepril and hctz given tosha      Acute Kidney Injury on CKD stage IIIb  - baseline Cr 1.3-1.6  - nephrology consulted given plan for cardiac cath on Monday   - Cr trending down, dc IV fluids   - Avoid nephrotoxic medications  - Dr. Will has started losartan 6/7    Elevated troponin level-likely 2/2 #1 and 2  - discussed with cardiology and possible plan for cardiac cath on Monday      Leukocytosis-likely reactive     Coronary artery disease s/p 2 coronary stents  - Continue aspirin and Plavix, pt reports he has not been taking daily      Uncontrolled IDDM with retinopathy 2/2 noncompliance with medications.  - Start patient on basal, prandial, and correctional insulins.  - Hold Metformin  - Hgb a1c- pending      Hyperlipidemia  - resume statin  - HDL 22, LDL 92, Triglycerides 308      Hypothyroidism  - pt states he stopped taking thyroid hormone.  - TSH 3.74 Free T4- 0.98     Hx of CVA with with residual vision loss     Seizure disorder clear related to prior CVA with extensive encephalomalacia  - continue Keppra  - pt reports he may have had a seizure prior to admission, no seizure activity since admission      Depression, chronic and uncontrolled- stopped taking home medications 2 months ago   (pt reports lexapro was working for the last 3 years, but effectiveness had declined prior to stopping it 2 months ago)  -Resume Lexapro monitor    BPH with BRITO  - self d/c'd flomax due to lightheadedness  - resume Flomax    DVT Prophylaxis  - heparin         VTE Prophylaxis -   Mechanical Order History:     None      Pharmalogical Order History:      Ordered     Dose Route Frequency Stop    06/05/21 0027  heparin (porcine) 5000 UNIT/ML injection 5,000 Units  Status:  Discontinued      5,000 Units SC Every 8 Hours Scheduled 06/05/21 0944    06/05/21 0944  heparin (porcine) 5000 UNIT/ML injection 5,000 Units      5,000 Units SC Every 12 Hours Scheduled --     06/04/21 2311  heparin (porcine) 5000 UNIT/ML injection 5,000 Units  Status:  Discontinued      5,000 Units SC Every 8 Hours Scheduled 06/05/21 0027                  Code Status -   Code Status and Medical Interventions:   Ordered at: 06/04/21 2311     Code Status:    CPR     Medical Interventions (Level of Support Prior to Arrest):    Full             Discharge Planning  Pending progress         Electronically signed by Caty Marroquin MD, 06/07/21, 21:59 EDT.  [facility] Hospitalist Team

## 2021-06-08 NOTE — PROGRESS NOTES
"   LOS: 4 days    Patient Care Team:  Marizol Puentes MD as PCP - General (Family Medicine)      Subjective     Patient Feeling better.  Denies any chest pain, shortness of breath, nausea or vomiting    Objective     Vital Sign Min/Max for last 24 hours  Temp:  [98.1 °F (36.7 °C)-99.3 °F (37.4 °C)] 98.4 °F (36.9 °C)  Heart Rate:  [70-84] 78  Resp:  [12-18] 18  BP: (124-174)/(61-90) 124/61                       Flowsheet Rows      First Filed Value   Admission Height  175.3 cm (69\") Documented at 06/04/2021 2120   Admission Weight  107 kg (234 lb 12.6 oz) Documented at 06/04/2021 2120          I/O this shift:  In: 360 [P.O.:360]  Out: -   I/O last 3 completed shifts:  In: 770 [P.O.:770]  Out: 1700 [Urine:1700]    Physical Exam:  Physical Exam    General.  Awake, alert  Head.  Atraumatic, normocephalic  Neck.  Supple.  No JVD  Respiratory.  Clear to auscultation bilaterally.  No wheezing or rhonchi  Cardiovascular.  Normal rate.  Regular rhythm.  S1, S2  Abdomen.  Soft, obese, nontender.  No organomegaly.  Bowel sounds present  Extremities.  No edema.  Pulses palpable  Neurological.  No focal deficit       LABS:  Lab Results   Component Value Date    CALCIUM 8.3 (L) 06/08/2021    PHOS 3.8 06/08/2021     Results from last 7 days   Lab Units 06/08/21  0250 06/07/21  0234 06/06/21  0511 06/05/21  0000   SODIUM mmol/L 136 136 138  --    POTASSIUM mmol/L 4.2 4.1 4.0  --    CHLORIDE mmol/L 106 107 107  --    CO2 mmol/L 18.0* 19.0* 20.0*  --    BUN mg/dL 22 24* 24*  --    CREATININE mg/dL 1.66* 1.94* 1.85*  --    GLUCOSE mg/dL 98 137* 144*  --    CALCIUM mg/dL 8.3* 8.2* 7.8*  --    WBC 10*3/mm3 11.20*  --  10.00 11.00*   HEMOGLOBIN g/dL 12.9*  --  12.0* 11.6*   PLATELETS 10*3/mm3 190  --  179 202   ALT (SGPT) U/L  --   --  7  --    AST (SGOT) U/L  --   --  13  --      Lab Results   Component Value Date    CKTOTAL 253 08/23/2018    TROPONINT 0.366 (C) 06/05/2021     Estimated Creatinine Clearance: 53.5 mL/min (A) (by " C-G formula based on SCr of 1.66 mg/dL (H)).      Brief Urine Lab Results  (Last result in the past 365 days)      Color   Clarity   Blood   Leuk Est   Nitrite   Protein   CREAT   Urine HCG        06/05/21 1359             227.3       06/05/21 1359 Yellow Cloudy  Comment:  Result checked  Moderate (2+) Negative Negative >=300 mg/dL (3+)             WEIGHTS:     Wt Readings from Last 1 Encounters:   06/08/21 0559 107 kg (235 lb 7.2 oz)   06/07/21 0500 110 kg (242 lb 8.1 oz)   06/06/21 0553 107 kg (235 lb 10.8 oz)   06/04/21 2120 107 kg (234 lb 12.6 oz)       amLODIPine, 10 mg, Oral, Daily  aspirin, 81 mg, Oral, Daily  atorvastatin, 40 mg, Oral, Daily  clopidogrel, 75 mg, Oral, Daily  docusate sodium, 100 mg, Oral, Daily  escitalopram, 10 mg, Oral, Daily  heparin (porcine), 5,000 Units, Subcutaneous, Q12H  hydrALAZINE, 50 mg, Oral, BID  insulin glargine, 10 Units, Subcutaneous, Daily With Dinner  insulin lispro, 0-14 Units, Subcutaneous, TID AC  insulin lispro, 5 Units, Subcutaneous, TID With Meals  levETIRAcetam, 500 mg, Oral, BID  levothyroxine, 25 mcg, Oral, Q AM  losartan, 50 mg, Oral, Q24H  metoprolol succinate XL, 50 mg, Oral, Q24H  sodium chloride, 10 mL, Intravenous, Q12H  tamsulosin, 0.4 mg, Oral, Nightly           Assessment/Plan       1.Acute kidney injury on CKD 3  Creatinine Little better but patient have underlying chronic kidney disease with significant proteinuria, secondary to diabetic glomerulosclerosis  Electrolytes, volume status okay    2.Hypertension  Blood pressure improving  Continue current antihypertensives  Stop hydralazine or Flomax if blood pressure drops or patient develops dizziness    3.  Proteinuria  Secondary to diabetic glomerulosclerosis  Continue ARB    4.Coronary artery disease  Cardiology evaluating      Sherman Will MD  06/08/21  11:06 EDT

## 2021-06-08 NOTE — PLAN OF CARE
Goal Outcome Evaluation:              Outcome Summary: Cars planning for heart cath tomorrow. Per son, pt needs LTC at [place] r/t to pt's inability to accurately dose meds. Per son, [facility] or [facility] is preferred.

## 2021-06-08 NOTE — CASE MANAGEMENT/SOCIAL WORK
Continued Stay Note   Brendan     Patient Name: Isaias Molina  MRN: [id number]  Today's Date: 6/8/2021    Admit Date: 6/4/2021    Discharge Plan     Row Name 06/08/21 1244       Plan    Plan Comments  Per CM conversation, pt is agreeable to referral for [place] Medicaid Waiver services.  faxed referral to [facility] via ad hoc fax to [phone].        Phone communication or documentation only - no physical contact with patient or family.    MERCEDES Zarate, [facility] [place]    Office: [phone]  Cell: [phone]  Fax: [phone]  E-mail: [email or web address]

## 2021-06-09 ENCOUNTER — APPOINTMENT (OUTPATIENT)
Dept: CARDIOLOGY | Facility: HOSPITAL | Age: 66
End: 2021-06-09

## 2021-06-09 LAB
ALBUMIN SERPL-MCNC: 2.8 G/DL (ref 3.5–5.2)
ANION GAP SERPL CALCULATED.3IONS-SCNC: 11 MMOL/L (ref 5–15)
APTT PPP: 26.2 SECONDS (ref 24–31)
BASOPHILS # BLD MANUAL: 0.29 10*3/MM3 (ref 0–0.2)
BASOPHILS NFR BLD AUTO: 2 % (ref 0–1.5)
BH CV XLRA MEAS - DIST GSV CALF DIST LEFT: 0.22 CM
BH CV XLRA MEAS - DIST GSV CALF DIST RIGHT: 0.21 CM
BH CV XLRA MEAS - DIST GSV THIGH DIST LEFT: 0.21 CM
BH CV XLRA MEAS - DIST GSV THIGH DIST RIGHT: 0.2 CM
BH CV XLRA MEAS - MID GSV CALF LEFT: 0.25 CM
BH CV XLRA MEAS - MID GSV CALF RIGHT: 0.22 CM
BH CV XLRA MEAS - MID GSV THIGH  LEFT: 0.15 CM
BH CV XLRA MEAS - MID GSV THIGH  RIGHT: 0.21 CM
BH CV XLRA MEAS - PROX GSV CALF DIST LEFT: 0.16 CM
BH CV XLRA MEAS - PROX GSV CALF DIST RIGHT: 0.22 CM
BH CV XLRA MEAS - PROX GSV THIGH  LEFT: 0.25 CM
BH CV XLRA MEAS - PROX GSV THIGH  RIGHT: 0.29 CM
BH CV XLRA MEAS LEFT CCA RATIO VEL: 114 CM/SEC
BH CV XLRA MEAS LEFT DIST CCA EDV: -13.3 CM/SEC
BH CV XLRA MEAS LEFT DIST CCA PSV: -90.9 CM/SEC
BH CV XLRA MEAS LEFT DIST ICA EDV: -12.3 CM/SEC
BH CV XLRA MEAS LEFT DIST ICA PSV: -65 CM/SEC
BH CV XLRA MEAS LEFT ICA RATIO VEL: -91.8 CM/SEC
BH CV XLRA MEAS LEFT ICA/CCA RATIO: -0.81
BH CV XLRA MEAS LEFT PROX CCA EDV: 13.3 CM/SEC
BH CV XLRA MEAS LEFT PROX CCA PSV: 114 CM/SEC
BH CV XLRA MEAS LEFT PROX ECA PSV: -232 CM/SEC
BH CV XLRA MEAS LEFT PROX ICA EDV: -19.6 CM/SEC
BH CV XLRA MEAS LEFT PROX ICA PSV: -91.8 CM/SEC
BH CV XLRA MEAS LEFT PROX SCLA PSV: 129 CM/SEC
BH CV XLRA MEAS RIGHT CCA RATIO VEL: 91.9 CM/SEC
BH CV XLRA MEAS RIGHT DIST CCA EDV: -10.6 CM/SEC
BH CV XLRA MEAS RIGHT DIST CCA PSV: -67.7 CM/SEC
BH CV XLRA MEAS RIGHT DIST ICA EDV: -11.8 CM/SEC
BH CV XLRA MEAS RIGHT DIST ICA PSV: -55.7 CM/SEC
BH CV XLRA MEAS RIGHT ICA RATIO VEL: -68 CM/SEC
BH CV XLRA MEAS RIGHT ICA/CCA RATIO: -0.74
BH CV XLRA MEAS RIGHT PROX CCA EDV: 13 CM/SEC
BH CV XLRA MEAS RIGHT PROX CCA PSV: 91.9 CM/SEC
BH CV XLRA MEAS RIGHT PROX ECA PSV: -353 CM/SEC
BH CV XLRA MEAS RIGHT PROX ICA EDV: -14.9 CM/SEC
BH CV XLRA MEAS RIGHT PROX ICA PSV: -68 CM/SEC
BH CV XLRA MEAS RIGHT PROX SCLA PSV: 198 CM/SEC
BH CV XLRA MEAS RIGHT VERTEBRAL A PSV: 43 CM/SEC
BUN SERPL-MCNC: 25 MG/DL (ref 8–23)
BUN/CREAT SERPL: 12.7 (ref 7–25)
CALCIUM SPEC-SCNC: 8.5 MG/DL (ref 8.6–10.5)
CHLORIDE SERPL-SCNC: 103 MMOL/L (ref 98–107)
CO2 SERPL-SCNC: 22 MMOL/L (ref 22–29)
CREAT SERPL-MCNC: 1.97 MG/DL (ref 0.76–1.27)
DEPRECATED RDW RBC AUTO: 42.9 FL (ref 37–54)
EOSINOPHIL # BLD MANUAL: 0.14 10*3/MM3 (ref 0–0.4)
EOSINOPHIL NFR BLD MANUAL: 1 % (ref 0.3–6.2)
ERYTHROCYTE [DISTWIDTH] IN BLOOD BY AUTOMATED COUNT: 15.2 % (ref 12.3–15.4)
GFR SERPL CREATININE-BSD FRML MDRD: 34 ML/MIN/1.73
GLUCOSE BLDC GLUCOMTR-MCNC: 127 MG/DL (ref 70–105)
GLUCOSE BLDC GLUCOMTR-MCNC: 178 MG/DL (ref 70–105)
GLUCOSE BLDC GLUCOMTR-MCNC: 200 MG/DL (ref 70–105)
GLUCOSE BLDC GLUCOMTR-MCNC: 303 MG/DL (ref 70–105)
GLUCOSE BLDC GLUCOMTR-MCNC: 329 MG/DL (ref 70–105)
GLUCOSE SERPL-MCNC: 92 MG/DL (ref 65–99)
HCT VFR BLD AUTO: 37 % (ref 37.5–51)
HGB BLD-MCNC: 13 G/DL (ref 13–17.7)
INR PPP: 0.97 (ref 0.93–1.1)
LEFT ARM BP: NORMAL MMHG
LYMPHOCYTES # BLD MANUAL: 4.03 10*3/MM3 (ref 0.7–3.1)
LYMPHOCYTES NFR BLD MANUAL: 28 % (ref 19.6–45.3)
LYMPHOCYTES NFR BLD MANUAL: 4 % (ref 5–12)
MAGNESIUM SERPL-MCNC: 1.7 MG/DL (ref 1.6–2.4)
MAXIMAL PREDICTED HEART RATE: 155 BPM
MAXIMAL PREDICTED HEART RATE: 155 BPM
MCH RBC QN AUTO: 28.1 PG (ref 26.6–33)
MCHC RBC AUTO-ENTMCNC: 35.1 G/DL (ref 31.5–35.7)
MCV RBC AUTO: 80 FL (ref 79–97)
MONOCYTES # BLD AUTO: 0.58 10*3/MM3 (ref 0.1–0.9)
NEUTROPHILS # BLD AUTO: 9.36 10*3/MM3 (ref 1.7–7)
NEUTROPHILS NFR BLD MANUAL: 64 % (ref 42.7–76)
NEUTS BAND NFR BLD MANUAL: 1 % (ref 0–5)
PHOSPHATE SERPL-MCNC: 4.1 MG/DL (ref 2.5–4.5)
PLAT MORPH BLD: NORMAL
PLATELET # BLD AUTO: 230 10*3/MM3 (ref 140–450)
PMV BLD AUTO: 9.5 FL (ref 6–12)
POTASSIUM SERPL-SCNC: 4.7 MMOL/L (ref 3.5–5.2)
PROTHROMBIN TIME: 10.7 SECONDS (ref 9.6–11.7)
RBC # BLD AUTO: 4.62 10*6/MM3 (ref 4.14–5.8)
RBC MORPH BLD: NORMAL
RIGHT ARM BP: NORMAL MMHG
SCAN SLIDE: NORMAL
SODIUM SERPL-SCNC: 136 MMOL/L (ref 136–145)
STRESS TARGET HR: 132 BPM
STRESS TARGET HR: 132 BPM
WBC # BLD AUTO: 14.4 10*3/MM3 (ref 3.4–10.8)
WBC MORPH BLD: NORMAL

## 2021-06-09 PROCEDURE — 99222 1ST HOSP IP/OBS MODERATE 55: CPT | Performed by: THORACIC SURGERY (CARDIOTHORACIC VASCULAR SURGERY)

## 2021-06-09 PROCEDURE — 93454 CORONARY ARTERY ANGIO S&I: CPT | Performed by: INTERNAL MEDICINE

## 2021-06-09 PROCEDURE — 25010000002 DIPHENHYDRAMINE PER 50 MG: Performed by: INTERNAL MEDICINE

## 2021-06-09 PROCEDURE — 93306 TTE W/DOPPLER COMPLETE: CPT | Performed by: INTERNAL MEDICINE

## 2021-06-09 PROCEDURE — 99232 SBSQ HOSP IP/OBS MODERATE 35: CPT | Performed by: INTERNAL MEDICINE

## 2021-06-09 PROCEDURE — 85025 COMPLETE CBC W/AUTO DIFF WBC: CPT | Performed by: HOSPITALIST

## 2021-06-09 PROCEDURE — 82962 GLUCOSE BLOOD TEST: CPT

## 2021-06-09 PROCEDURE — 85730 THROMBOPLASTIN TIME PARTIAL: CPT | Performed by: HOSPITALIST

## 2021-06-09 PROCEDURE — 4A023N7 MEASUREMENT OF CARDIAC SAMPLING AND PRESSURE, LEFT HEART, PERCUTANEOUS APPROACH: ICD-10-PCS | Performed by: INTERNAL MEDICINE

## 2021-06-09 PROCEDURE — 85007 BL SMEAR W/DIFF WBC COUNT: CPT | Performed by: HOSPITALIST

## 2021-06-09 PROCEDURE — 25010000002 MIDAZOLAM PER 1 MG: Performed by: INTERNAL MEDICINE

## 2021-06-09 PROCEDURE — 93880 EXTRACRANIAL BILAT STUDY: CPT

## 2021-06-09 PROCEDURE — B2111ZZ FLUOROSCOPY OF MULTIPLE CORONARY ARTERIES USING LOW OSMOLAR CONTRAST: ICD-10-PCS | Performed by: INTERNAL MEDICINE

## 2021-06-09 PROCEDURE — 25010000002 FENTANYL CITRATE (PF) 100 MCG/2ML SOLUTION: Performed by: INTERNAL MEDICINE

## 2021-06-09 PROCEDURE — 99232 SBSQ HOSP IP/OBS MODERATE 35: CPT | Performed by: HOSPITALIST

## 2021-06-09 PROCEDURE — 85610 PROTHROMBIN TIME: CPT | Performed by: HOSPITALIST

## 2021-06-09 PROCEDURE — 25010000002 HEPARIN (PORCINE) PER 1000 UNITS: Performed by: INTERNAL MEDICINE

## 2021-06-09 PROCEDURE — 83735 ASSAY OF MAGNESIUM: CPT | Performed by: HOSPITALIST

## 2021-06-09 PROCEDURE — 25010000002 METHYLPREDNISOLONE PER 125 MG: Performed by: INTERNAL MEDICINE

## 2021-06-09 PROCEDURE — 99152 MOD SED SAME PHYS/QHP 5/>YRS: CPT | Performed by: INTERNAL MEDICINE

## 2021-06-09 PROCEDURE — C1769 GUIDE WIRE: HCPCS | Performed by: INTERNAL MEDICINE

## 2021-06-09 PROCEDURE — 80069 RENAL FUNCTION PANEL: CPT | Performed by: INTERNAL MEDICINE

## 2021-06-09 PROCEDURE — C1894 INTRO/SHEATH, NON-LASER: HCPCS | Performed by: INTERNAL MEDICINE

## 2021-06-09 PROCEDURE — 93970 EXTREMITY STUDY: CPT

## 2021-06-09 PROCEDURE — B2151ZZ FLUOROSCOPY OF LEFT HEART USING LOW OSMOLAR CONTRAST: ICD-10-PCS | Performed by: INTERNAL MEDICINE

## 2021-06-09 PROCEDURE — 0 IOPAMIDOL PER 1 ML: Performed by: INTERNAL MEDICINE

## 2021-06-09 PROCEDURE — 25010000002 HYDRALAZINE PER 20 MG: Performed by: INTERNAL MEDICINE

## 2021-06-09 PROCEDURE — 25010000003 LIDOCAINE 1 % SOLUTION: Performed by: INTERNAL MEDICINE

## 2021-06-09 PROCEDURE — 63710000001 INSULIN GLARGINE PER 5 UNITS: Performed by: INTERNAL MEDICINE

## 2021-06-09 PROCEDURE — 97116 GAIT TRAINING THERAPY: CPT

## 2021-06-09 PROCEDURE — 63710000001 INSULIN LISPRO (HUMAN) PER 5 UNITS: Performed by: INTERNAL MEDICINE

## 2021-06-09 PROCEDURE — 93306 TTE W/DOPPLER COMPLETE: CPT

## 2021-06-09 RX ORDER — ACETAMINOPHEN 325 MG/1
650 TABLET ORAL EVERY 4 HOURS PRN
Status: DISCONTINUED | OUTPATIENT
Start: 2021-06-09 | End: 2021-06-13 | Stop reason: HOSPADM

## 2021-06-09 RX ORDER — LIDOCAINE HYDROCHLORIDE 10 MG/ML
INJECTION, SOLUTION INFILTRATION; PERINEURAL AS NEEDED
Status: DISCONTINUED | OUTPATIENT
Start: 2021-06-09 | End: 2021-06-09 | Stop reason: HOSPADM

## 2021-06-09 RX ORDER — ACETAMINOPHEN 650 MG
TABLET, EXTENDED RELEASE ORAL DAILY
Status: DISCONTINUED | OUTPATIENT
Start: 2021-06-09 | End: 2021-06-13 | Stop reason: HOSPADM

## 2021-06-09 RX ORDER — FENTANYL CITRATE 50 UG/ML
INJECTION, SOLUTION INTRAMUSCULAR; INTRAVENOUS AS NEEDED
Status: DISCONTINUED | OUTPATIENT
Start: 2021-06-09 | End: 2021-06-09 | Stop reason: HOSPADM

## 2021-06-09 RX ORDER — SODIUM CHLORIDE 9 MG/ML
100 INJECTION, SOLUTION INTRAVENOUS CONTINUOUS
Status: DISCONTINUED | OUTPATIENT
Start: 2021-06-09 | End: 2021-06-12

## 2021-06-09 RX ORDER — SODIUM CHLORIDE 9 MG/ML
75 INJECTION, SOLUTION INTRAVENOUS CONTINUOUS
Status: DISCONTINUED | OUTPATIENT
Start: 2021-06-09 | End: 2021-06-12

## 2021-06-09 RX ORDER — MIDAZOLAM HYDROCHLORIDE 1 MG/ML
1 INJECTION INTRAMUSCULAR; INTRAVENOUS ONCE
Status: DISCONTINUED | OUTPATIENT
Start: 2021-06-09 | End: 2021-06-09 | Stop reason: HOSPADM

## 2021-06-09 RX ORDER — FENTANYL CITRATE 50 UG/ML
25 INJECTION, SOLUTION INTRAMUSCULAR; INTRAVENOUS ONCE
Status: DISCONTINUED | OUTPATIENT
Start: 2021-06-09 | End: 2021-06-09 | Stop reason: HOSPADM

## 2021-06-09 RX ORDER — DIPHENHYDRAMINE HYDROCHLORIDE 50 MG/ML
50 INJECTION INTRAMUSCULAR; INTRAVENOUS ONCE
Status: COMPLETED | OUTPATIENT
Start: 2021-06-09 | End: 2021-06-09

## 2021-06-09 RX ORDER — SODIUM CHLORIDE 9 MG/ML
1-3 INJECTION, SOLUTION INTRAVENOUS CONTINUOUS
Status: DISCONTINUED | OUTPATIENT
Start: 2021-06-09 | End: 2021-06-13 | Stop reason: HOSPADM

## 2021-06-09 RX ORDER — SODIUM CHLORIDE 9 MG/ML
250 INJECTION, SOLUTION INTRAVENOUS ONCE AS NEEDED
Status: DISCONTINUED | OUTPATIENT
Start: 2021-06-09 | End: 2021-06-13 | Stop reason: HOSPADM

## 2021-06-09 RX ORDER — METHYLPREDNISOLONE SODIUM SUCCINATE 125 MG/2ML
125 INJECTION, POWDER, LYOPHILIZED, FOR SOLUTION INTRAMUSCULAR; INTRAVENOUS ONCE
Status: COMPLETED | OUTPATIENT
Start: 2021-06-09 | End: 2021-06-09

## 2021-06-09 RX ORDER — MIDAZOLAM HYDROCHLORIDE 1 MG/ML
INJECTION INTRAMUSCULAR; INTRAVENOUS AS NEEDED
Status: DISCONTINUED | OUTPATIENT
Start: 2021-06-09 | End: 2021-06-09 | Stop reason: HOSPADM

## 2021-06-09 RX ADMIN — Medication 600 MG: at 07:35

## 2021-06-09 RX ADMIN — TAMSULOSIN HYDROCHLORIDE 0.4 MG: 0.4 CAPSULE ORAL at 20:49

## 2021-06-09 RX ADMIN — HYDRALAZINE HYDROCHLORIDE 50 MG: 25 TABLET, FILM COATED ORAL at 07:30

## 2021-06-09 RX ADMIN — SODIUM CHLORIDE 100 ML/HR: 9 INJECTION, SOLUTION INTRAVENOUS at 07:32

## 2021-06-09 RX ADMIN — HYDRALAZINE HYDROCHLORIDE 50 MG: 25 TABLET, FILM COATED ORAL at 20:49

## 2021-06-09 RX ADMIN — DIPHENHYDRAMINE HYDROCHLORIDE 50 MG: 50 INJECTION, SOLUTION INTRAMUSCULAR; INTRAVENOUS at 08:54

## 2021-06-09 RX ADMIN — POVIDONE-IODINE: 10 SOLUTION TOPICAL at 13:09

## 2021-06-09 RX ADMIN — INSULIN LISPRO 10 UNITS: 100 INJECTION, SOLUTION INTRAVENOUS; SUBCUTANEOUS at 18:44

## 2021-06-09 RX ADMIN — Medication 10 ML: at 07:32

## 2021-06-09 RX ADMIN — LEVETIRACETAM 500 MG: 500 TABLET ORAL at 07:31

## 2021-06-09 RX ADMIN — Medication 600 MG: at 20:50

## 2021-06-09 RX ADMIN — ATORVASTATIN CALCIUM 40 MG: 40 TABLET, FILM COATED ORAL at 07:31

## 2021-06-09 RX ADMIN — ESCITALOPRAM OXALATE 10 MG: 10 TABLET ORAL at 07:30

## 2021-06-09 RX ADMIN — INSULIN GLARGINE 10 UNITS: 100 INJECTION, SOLUTION SUBCUTANEOUS at 18:45

## 2021-06-09 RX ADMIN — INSULIN LISPRO 5 UNITS: 100 INJECTION, SOLUTION INTRAVENOUS; SUBCUTANEOUS at 13:09

## 2021-06-09 RX ADMIN — HYDRALAZINE HYDROCHLORIDE 10 MG: 20 INJECTION INTRAMUSCULAR; INTRAVENOUS at 02:27

## 2021-06-09 RX ADMIN — INSULIN LISPRO 5 UNITS: 100 INJECTION, SOLUTION INTRAVENOUS; SUBCUTANEOUS at 18:45

## 2021-06-09 RX ADMIN — LEVOTHYROXINE SODIUM 25 MCG: 0.03 TABLET ORAL at 05:32

## 2021-06-09 RX ADMIN — CLOPIDOGREL BISULFATE 75 MG: 75 TABLET ORAL at 05:32

## 2021-06-09 RX ADMIN — AMLODIPINE BESYLATE 10 MG: 5 TABLET ORAL at 07:31

## 2021-06-09 RX ADMIN — INSULIN LISPRO 3 UNITS: 100 INJECTION, SOLUTION INTRAVENOUS; SUBCUTANEOUS at 13:09

## 2021-06-09 RX ADMIN — METHYLPREDNISOLONE SODIUM SUCCINATE 125 MG: 125 INJECTION, POWDER, FOR SOLUTION INTRAMUSCULAR; INTRAVENOUS at 08:54

## 2021-06-09 RX ADMIN — LOSARTAN POTASSIUM 50 MG: 50 TABLET, FILM COATED ORAL at 07:31

## 2021-06-09 RX ADMIN — LEVETIRACETAM 500 MG: 500 TABLET ORAL at 20:50

## 2021-06-09 RX ADMIN — Medication 10 ML: at 20:50

## 2021-06-09 RX ADMIN — HEPARIN SODIUM 5000 UNITS: 5000 INJECTION INTRAVENOUS; SUBCUTANEOUS at 20:50

## 2021-06-09 RX ADMIN — ASPIRIN 81 MG: 81 TABLET, COATED ORAL at 05:32

## 2021-06-09 RX ADMIN — METOPROLOL SUCCINATE 50 MG: 50 TABLET, EXTENDED RELEASE ORAL at 07:30

## 2021-06-09 NOTE — CONSULTS
"  Patient Care Team:  Marizol Puentes MD as PCP - General (Family Medicine)  Referring Provider:  Dr. Robles  Reason for consultation:  MV CAD    Chief complaint:  \"feeling bad and high blood pressure\"    Subjective     History of Present Illness:  64 y/o gentleman presented to [facility] with reports of feeling bad and that his blood pressure at home was 220/.  Patient also reports he believes he had a seizure during the night before being to the ED but it was unwitnessed.  He has a complicated PMHx including CAD--status post stents, Plavix use, HTN, HLD, CVA, uncontrolled diabetes with retinopathy, CKD stage III, depression, hypothyroidism, BPH, and seizure disorder.  Additionally, patient is noted to be a poor historian and not compliant with medications.  His blood pressure was 227/108 in the ED.  And his initial troponin was 0.22 and it peaked at 1.03.  Creatinine 2.07 and WBC 14.  Head CT was negative for acute abnormality.  He denied any chest pain, SOA, palpitations, headache, nausea or vomiting.  He was transferred to [facility] for further evaluation and management.  Dr. Robles was consulted for cardiac work-up.  On 6/7/2021 he underwent stress test which showed EF 48% and imaging that showed a large size severe area of ischemia in the inferior wall.  Cardiac cath today showed multivessel CAD.  No dictated report available at this time.  His last echo in Epic is from 2019.  Dr. Warner was consulted for surgical evaluation.    Review of Systems   Constitutional: Positive for fatigue.        + Malaise   Respiratory: Negative for cough and shortness of breath.    Cardiovascular: Negative for chest pain, palpitations and leg swelling.   Gastrointestinal: Negative for abdominal pain, diarrhea and nausea.   Skin: Negative for rash and wound.   Allergic/Immunologic: Negative for immunocompromised state.   Neurological: Positive for seizures. Negative for dizziness, syncope, speech difficulty, weakness " and headaches.   Psychiatric/Behavioral: The patient is not nervous/anxious.         Past Medical History:   Diagnosis Date    Cellulitis 10/15/2013    Encounter for screening for malignant neoplasm of prostate 12/4/2017    Encounter for screening for other disorder 12/4/2017    Need for prophylactic vaccination against Streptococcus pneumoniae (pneumococcus) 10/19/2018    Onychomycosis 10/19/2018    Pain in right shoulder 10/3/2018    TIA (transient ischemic attack) 01/05/2020     Past Surgical History:   Procedure Laterality Date    CATARACT EXTRACTION WITH INTRAOCULAR LENS IMPLANT Bilateral     CORONARY ANGIOPLASTY WITH STENT PLACEMENT  2011    PTCA - LCx & RCA with stent    ROTATOR CUFF REPAIR Right     TOTAL SHOULDER REVERSE ARTHROPLASTY Right     after fracture 2018     Family History   Problem Relation Age of Onset    Cerebral aneurysm Father     Peripheral vascular disease Brother     Diabetes type II Brother     Nephrotic syndrome Grandson     Nephrotic syndrome Granddaughter      Social History     Tobacco Use    Smoking status: Never Smoker    Smokeless tobacco: Never Used   Substance Use Topics    Alcohol use: Never    Drug use: Never     Medications Prior to Admission   Medication Sig Dispense Refill Last Dose    amLODIPine (NORVASC) 5 MG tablet Take 1 tablet by mouth Daily. 90 tablet 3 5/15/2021 at 0900    aspirin 81 MG EC tablet Take 81 mg by mouth Daily.   6/2/2021    benazepril (LOTENSIN) 40 MG tablet Take 1 tablet by mouth Daily. 90 tablet 3 5/15/2021 at 0900    clopidogrel (PLAVIX) 75 MG tablet Take 1 tablet by mouth Daily. 90 tablet 3 5/15/2021 at 0900    Cyanocobalamin (B-12 COMPLIANCE INJECTION) 1000 MCG/ML kit B-12 COMPLIANCE INJECTION 1000 MCG/ML KIT       docusate sodium (COLACE) 100 MG capsule Take 1 capsule by mouth Daily. 90 capsule 0 5/15/2021    escitalopram (LEXAPRO) 10 MG tablet TAKE ONE TABLET BY MOUTH EVERY DAY 30 tablet 5     glucose blood (ACCU-CHEK ROLANDO) test strip 1 each by  "Other route Daily. Use as instructed 100 each 3     hydroCHLOROthiazide (HYDRODIURIL) 25 MG tablet TAKE ONE TABLET BY MOUTH EVERY DAY 90 tablet 3 5/15/2021    Insulin Syringe-Needle U-100 (ULTICARE INSULIN SYRINGE) 31G X 5/16\" 0.5 ML misc ULTICARE INSULIN SYRINGE 31G X 5/16\" 0.5 ML   5/27/2021    ketoconazole (NIZORAL) 2 % cream KETOCONAZOLE 2 % CREA       Lancets (ACCU-CHEK SOFT TOUCH) lancets Use with device and strips to check blood glucose 100 each 3 5/27/2021    levETIRAcetam (Keppra) 500 MG tablet Take 1 tablet by mouth 2 (Two) Times a Day. 60 tablet 5 6/4/2021 at 0900    levothyroxine (SYNTHROID) 25 MCG tablet Take 1 tablet by mouth Daily. 30 tablet 5     metFORMIN (GLUCOPHAGE) 500 MG tablet TAKE ONE TABLET BY MOUTH TWICE DAILY with meals 180 tablet 3 5/15/2021    NOVOLIN 70/30 (70-30) 100 UNIT/ML injection inject 50 units SUBCUTANEOUSLY EVERY MORNING AND 40 units SUBCUTANEOUSLY EVERY EVENING 30 mL 5 6/1/2021    pravastatin (PRAVACHOL) 40 MG tablet Take 1 tablet by mouth Daily. 30 tablet 5 5/15/2021    tamsulosin (FLOMAX) 0.4 MG capsule 24 hr capsule TAKE ONE CAPSULE BY MOUTH TWICE DAILY 60 capsule 11      Acetylcysteine, 600 mg, Oral, BID  amLODIPine, 10 mg, Oral, Daily  aspirin, 81 mg, Oral, Daily  atorvastatin, 40 mg, Oral, Daily  clopidogrel, 75 mg, Oral, Daily  docusate sodium, 100 mg, Oral, Daily  escitalopram, 10 mg, Oral, Daily  heparin (porcine), 5,000 Units, Subcutaneous, Q12H  hydrALAZINE, 50 mg, Oral, BID  insulin glargine, 10 Units, Subcutaneous, Daily With Dinner  insulin lispro, 0-14 Units, Subcutaneous, TID AC  insulin lispro, 5 Units, Subcutaneous, TID With Meals  levETIRAcetam, 500 mg, Oral, BID  levothyroxine, 25 mcg, Oral, Q AM  losartan, 50 mg, Oral, Q24H  metoprolol succinate XL, 50 mg, Oral, Q24H  povidone-iodine, , Topical, Daily  sodium chloride, 10 mL, Intravenous, Q12H  tamsulosin, 0.4 mg, Oral, Nightly      Allergies:  Shellfish allergy    Objective      Vital Signs  Temp:  [98 °F " "(36.7 °C)-98.5 °F (36.9 °C)] 98.1 °F (36.7 °C)  Heart Rate:  [70-77] 70  Resp:  [16-18] 17  BP: (126-171)/(66-87) 126/66    Flowsheet Rows        First Filed Value   Admission Height  175.3 cm (69\") Documented at 06/04/2021 2120   Admission Weight  107 kg (234 lb 12.6 oz) Documented at 06/04/2021 2120          175.3 cm (69\")    Physical Exam  Vitals and nursing note reviewed.   Constitutional:       General: He is sleeping.      Appearance: Normal appearance. He is well-developed. He is obese.      Comments: No family present   HENT:      Head: Normocephalic and atraumatic.      Nose: Nose normal.      Mouth/Throat:      Lips: Pink.      Mouth: Mucous membranes are moist.      Pharynx: Uvula midline.   Eyes:      General: Lids are normal. No scleral icterus.     Extraocular Movements: Extraocular movements intact.      Conjunctiva/sclera: Conjunctivae normal.      Pupils: Pupils are equal, round, and reactive to light.      Comments: Vision deficit--eyes follow voice   Neck:      Thyroid: No thyroid mass or thyromegaly.      Vascular: Normal carotid pulses. No carotid bruit, hepatojugular reflux or JVD.      Trachea: Trachea normal.   Cardiovascular:      Rate and Rhythm: Normal rate and regular rhythm.      Pulses:           Carotid pulses are 1+ on the right side and 1+ on the left side.       Radial pulses are 1+ on the right side and 1+ on the left side.        Femoral pulses are 1+ on the right side and 1+ on the left side.       Popliteal pulses are 1+ on the right side and 1+ on the left side.        Dorsalis pedis pulses are 1+ on the right side and 1+ on the left side.        Posterior tibial pulses are 1+ on the right side and 1+ on the left side.      Heart sounds: Normal heart sounds. No murmur heard.     Pulmonary:      Effort: Pulmonary effort is normal.      Breath sounds: Normal breath sounds.   Abdominal:      General: Abdomen is protuberant. Bowel sounds are normal. There is no distension or " abdominal bruit.      Palpations: Abdomen is soft.      Tenderness: There is no abdominal tenderness.   Musculoskeletal:      Cervical back: Neck supple.      Right lower leg: No edema.      Left lower leg: No edema.      Comments: Gait steady and strong without use of assistive devices   Lymphadenopathy:      Cervical: No cervical adenopathy.      Upper Body:      Right upper body: No supraclavicular adenopathy.      Left upper body: No supraclavicular adenopathy.   Skin:     General: Skin is warm and dry.      Capillary Refill: Capillary refill takes less than 2 seconds.      Findings: Abrasion present. No erythema or rash.      Nails: There is no clubbing.             Comments: Noted thickened toenails.  Left great toenail over grown and curved.   Neurological:      Mental Status: He is oriented to person, place, and time and easily aroused.      GCS: GCS eye subscore is 4. GCS verbal subscore is 5. GCS motor subscore is 6.   Psychiatric:         Attention and Perception: Attention and perception normal.         Mood and Affect: Affect is flat.         Speech: Speech normal.         Behavior: Behavior normal. Behavior is cooperative.         Thought Content: Thought content normal.         Judgment: Judgment normal.      Comments: Poor historian         Results Review:   Lab Results (last 24 hours)       Procedure Component Value Units Date/Time    POC Glucose Once [[id number]]  (Abnormal) Collected: 06/09/21 1120    Specimen: Blood Updated: 06/09/21 1121     Glucose 178 mg/dL      Comment: Serial Number: [id number]:  [id number]       POC Glucose Once [[id number]]  (Abnormal) Collected: 06/09/21 0739    Specimen: Blood Updated: 06/09/21 0740     Glucose 127 mg/dL      Comment: Serial Number: [id number]:  [id number]       Manual Differential [[id number]]  (Abnormal) Collected: 06/09/21 0640    Specimen: Blood Updated: 06/09/21 0725     Neutrophil % 64.0 %      Lymphocyte % 28.0 %      Monocyte % 4.0 %       Eosinophil % 1.0 %      Basophil % 2.0 %      Bands %  1.0 %      Neutrophils Absolute 9.36 10*3/mm3      Lymphocytes Absolute 4.03 10*3/mm3      Monocytes Absolute 0.58 10*3/mm3      Eosinophils Absolute 0.14 10*3/mm3      Basophils Absolute 0.29 10*3/mm3      RBC Morphology Normal     WBC Morphology Normal     Platelet Morphology Normal    CBC & Differential [[id number]]  (Abnormal) Collected: 06/09/21 0640    Specimen: Blood Updated: 06/09/21 0725    Narrative:      The following orders were created for panel order CBC & Differential.  Procedure                               Abnormality         Status                     ---------                               -----------         ------                     Scan Slide[[id number]]                                       Final result               CBC Auto Differential[[id number]]        Abnormal            Final result                 Please view results for these tests on the individual orders.    Scan Slide [[id number]] Collected: 06/09/21 0640    Specimen: Blood Updated: 06/09/21 0725     Scan Slide --     Comment: See Manual Differential Results       CBC Auto Differential [[id number]]  (Abnormal) Collected: 06/09/21 0640    Specimen: Blood Updated: 06/09/21 0725     WBC 14.40 10*3/mm3      RBC 4.62 10*6/mm3      Hemoglobin 13.0 g/dL      Hematocrit 37.0 %      MCV 80.0 fL      MCH 28.1 pg      MCHC 35.1 g/dL      RDW 15.2 %      RDW-SD 42.9 fl      MPV 9.5 fL      Platelets 230 10*3/mm3     Narrative:      The previously reported component NRBC is no longer being reported. Previous result was 0.5 /100 WBC (Reference Range: 0.0-0.2 /100 WBC) on 6/9/2021 at 0651 EDT.    aPTT [[id number]]  (Normal) Collected: 06/09/21 0640    Specimen: Blood Updated: 06/09/21 0701     PTT 26.2 seconds     Protime-INR [[id number]]  (Normal) Collected: 06/09/21 0640    Specimen: Blood Updated: 06/09/21 0701     Protime 10.7 Seconds      INR 0.97    Magnesium [[id number]]  (Normal)  Collected: 06/09/21 0246    Specimen: Blood Updated: 06/09/21 0355     Magnesium 1.7 mg/dL     Renal Function Panel [[id number]]  (Abnormal) Collected: 06/09/21 0246    Specimen: Blood Updated: 06/09/21 0321     Glucose 92 mg/dL      BUN 25 mg/dL      Creatinine 1.97 mg/dL      Sodium 136 mmol/L      Potassium 4.7 mmol/L      Comment: Slight hemolysis detected by analyzer. Results may be affected.        Chloride 103 mmol/L      CO2 22.0 mmol/L      Calcium 8.5 mg/dL      Albumin 2.80 g/dL      Phosphorus 4.1 mg/dL      Anion Gap 11.0 mmol/L      BUN/Creatinine Ratio 12.7     eGFR Non African Amer 34 mL/min/1.73     Narrative:      GFR Normal >60  Chronic Kidney Disease <60  Kidney Failure <15      SCANNED - LABS [[id number]] Resulted: 06/04/21     Updated: 06/09/21 0111    POC Glucose Once [[id number]]  (Abnormal) Collected: 06/08/21 2058    Specimen: Blood Updated: 06/08/21 2101     Glucose 107 mg/dL      Comment: Serial Number: [id number]:  [id number]                   Assessment/Plan       Acute kidney injury superimposed on chronic kidney disease (CMS/HCC)    Hypertensive emergency    Elevated troponin level    Abnormal nuclear stress test      Assessment & Plan    MV CAD, with prior PCI to LCx and RCA (2011), EF 48%--surgical workup in progress  NSTEMI  Hx mild mitral regurgitation--echo ordered  DYLON on CKD, stage 3--renal following  HTN emergency--had stopped meds  DM, type 2 with retinopathy--a1c 6.4  Dyslipidemia--statin  Hx seizure disorder--Keppra  Hx stroke--no focal deficit  Hypothyroidism--levothyroxine  Depression--Lexapro  Plavix use--received dose 6/9  Vision deficit r/t possibly cataract surgery    Dr. Warner reviewed cath films and talked with patient.  Patient denies any chest pain or shortness of breath.  Echo and vascular studies ordered.  Given patient's CKD, his kidneys will need time to flush cath.  Information obtained from chart, nursing, and patient.  I called both his sons, Jarvis and  Ryley but was unable to reach them.  Additionally he remains on Plavix.  Full recs to follow per Dr. Warner.    Addendum:  Dr. Warner recommended PCI intervention.  Dr. Robles aware.  Plans per cardiology service.    Thank you for allowing us to participate in the care of this patient.      Swapna ESCOBARScottie SatfrancoisjimmyMackenzieShelley, APRN  06/09/21  13:39 EDT    **all problems new to this examiner  **EKG and CXR independently reviewed and interpreted  Addendum  The patient was seen and examined by me, I reviewed and interpreted the studies and discussed the findings and options with the patient.  He has multivessel coronary artery disease and multiple comorbidities including poor mobility and blindness.  Also there is noncompliance.  I recommend PCI to the LAD and maybe the PDA feasible.  Discussed with cardiology.  Jaron Warner MD

## 2021-06-09 NOTE — PLAN OF CARE
Assessment: Isaias Molina presents with functional mobility impairments which indicate the need for skilled intervention. Pt SBA for bed mobility supine to sitting EOB. Pt SBA for STS transfer from sitting EOB and STS from toilet. Pt CGA for ambulation due to mild to moderate postural instability during gait that increased with head turns and fatigue; gait speed also decreased with fatigue. Ambulated 45' and limited by fatigue this date. Pt returned to supine with HOB elevated. Tolerating session today without incident. Will continue to follow and progress as tolerated.

## 2021-06-09 NOTE — PROGRESS NOTES
[facility] Medicine Services Daily Progress Note      Hospitalist Team  LOS 5 days      Patient Care Team:  Marizol Puentes MD as PCP - General (Family Medicine)    Patient Location: 2119/1      Subjective   Subjective     Chief Complaint / Subjective  F/u elevated bp and troponin     Brief Synopsis of Hospital Course/HPI  Isaias Molina is a 65 y.o. male who presented to [facility] from the ED at [facility] in [place]. He reports that he was having uncontrolled blood pressure at home.      Patient has a history of coronary artery disease with previous PCI to the left circumflex and RCA in 2011.     The patient reports in the last month he has stopped many of his medications including antihypertensive agents, his statin and Plavix.     He reports that he was having periods of lightheadedness and dizziness and thought his blood pressure was running low so he stopped these medications.     He reports with Covid he has not been out to the doctor and missed several follow-ups.     He denies any exertional chest pain or dyspnea but reports yesterday he was feeling unwell which prompted his visit to the emergency room.     Yesterday he was unaware of having a seizure. However this morning he reports he thinks he had a seizure yesterday because his muscles feel sore and tight and he bit his tongue.     He denies any current chest pain  In the emergency room at [facility] it was reported that his troponin was 1.0       6/5- Patient denies any further chest pain. Reports he thinks he might have had a seizure yesterday due to muscle cramping today.     6/6- Patient denies any complaint.    6/7- Patient reports no specific complaints at this time.  He had multiple questions related to his current meds and plan of treatment and all questions were answered.    6/8- Patient pt reports uncontrolled symptoms of depression (the bedside nurse also alerted me to this concern).  " The patient reports lexapro was working for the last 3 years, but effectiveness had declined prior to stopping it 2 months ago.  He is willing to try it again.    6/9- Patient reports no complaints. He has multiple concerns and was extensively counseled.      ROS12 point review of systems was reviewed and was negative except as above.      Objective   Objective      Vital Signs  Temp:  [98 °F (36.7 °C)-98.2 °F (36.8 °C)] 98.2 °F (36.8 °C)  Heart Rate:  [68-81] 76  Resp:  [16-18] 18  BP: (117-171)/(56-87) 136/70  Oxygen Therapy  SpO2: 98 %  Pulse Oximetry Type: Intermittent  Device (Oxygen Therapy): room air  Flowsheet Rows        First Filed Value   Admission Height  175.3 cm (69\") Documented at 06/04/2021 2120   Admission Weight  107 kg (234 lb 12.6 oz) Documented at 06/04/2021 2120          Intake & Output (last 3 days)         06/07 0701 - 06/08 0700 06/08 0701 - 06/09 0700 06/09 0701 - 06/10 0700    P.O. 770 840 480    Total Intake(mL/kg) 770 (7.2) 840 (7.9) 480 (4.5)    Urine (mL/kg/hr) 675 (0.3) 725 (0.3)     Total Output 675 725     Net +95 +115 +480           Urine Unmeasured Occurrence 2 x 125 x 2 x    Stool Unmeasured Occurrence 1 x            Lines, Drains & Airways      Active LDAs       Name:   Placement date:   Placement time:   Site:   Days:    Peripheral IV 06/04/21  Posterior;Right Wrist   06/04/21    -- unknown    Wrist   1    Peripheral IV 06/05/21 0514 Left;Posterior Hand   06/05/21 0514    Hand   less than 1                      Physical Exam:    Physical Exam  Vital signs and nurses notes reviewed.  Well-developed well-nourished gentleman in no acute distress sitting up in bed awake and alert; mucous membranes moist; sclerae anicteric; lungs clear to auscultation bilaterally; CV regular rate and rhythm; abdomen soft nontender nondistended with active bowel sounds; extremities with no edema, cyanosis or calf tenderness; palpable pedal pulses bilaterally; right groin soft with no ecchymosis " and clean dry dressing in place not removed; no Flowers catheter.       Wounds (last 24 hours)        LDA Wound       Row Name 06/09/21 0800 06/08/21 2020          Wound 06/08/21 0800 Left medial great toe Diabetic Ulcer    Wound - Properties Group Placement Date: 06/08/21  -BB Placement Time: 0800 -BB Side: Left  -BB Orientation: medial  -BB Location: great toe  -BB Primary Wound Type: Diabetic ulc  -BB    Dressing Appearance  open to air  -BB  --     Base  scab  -BB  purple  -RT     Periwound  blanchable;redness  -BB  --     Periwound Temperature  --  warm  -RT     Retired Wound - Properties Group Date first assessed: 06/08/21  -BB Time first assessed: 0800 -BB Side: Left  -BB Location: great toe  -BB Primary Wound Type: Diabetic ulc  -BB          User Key  (r) = Recorded By, (t) = Taken By, (c) = Cosigned By      Initials Name Provider Type    BB Brunner, Brittany, RN Registered Nurse    RT Casie Sauer RN Registered Nurse            Procedures:              Results Review:     I reviewed the patient's new clinical results.      Lab Results (last 24 hours)       Procedure Component Value Units Date/Time    POC Glucose Once [[id number]]  (Abnormal) Collected: 06/09/21 1714    Specimen: Blood Updated: 06/09/21 1717     Glucose 303 mg/dL      Comment: Serial Number: [id number]:  [id number]       POC Glucose Once [[id number]]  (Abnormal) Collected: 06/09/21 1342    Specimen: Blood Updated: 06/09/21 1344     Glucose 200 mg/dL      Comment: Serial Number: [id number]:  [id number]       POC Glucose Once [[id number]]  (Abnormal) Collected: 06/09/21 1120    Specimen: Blood Updated: 06/09/21 1121     Glucose 178 mg/dL      Comment: Serial Number: [id number]:  [id number]       POC Glucose Once [[id number]]  (Abnormal) Collected: 06/09/21 0739    Specimen: Blood Updated: 06/09/21 0740     Glucose 127 mg/dL      Comment: Serial Number: [id number]:  [id number]       Manual Differential [[id number]]   (Abnormal) Collected: 06/09/21 0640    Specimen: Blood Updated: 06/09/21 0725     Neutrophil % 64.0 %      Lymphocyte % 28.0 %      Monocyte % 4.0 %      Eosinophil % 1.0 %      Basophil % 2.0 %      Bands %  1.0 %      Neutrophils Absolute 9.36 10*3/mm3      Lymphocytes Absolute 4.03 10*3/mm3      Monocytes Absolute 0.58 10*3/mm3      Eosinophils Absolute 0.14 10*3/mm3      Basophils Absolute 0.29 10*3/mm3      RBC Morphology Normal     WBC Morphology Normal     Platelet Morphology Normal    CBC & Differential [[id number]]  (Abnormal) Collected: 06/09/21 0640    Specimen: Blood Updated: 06/09/21 0725    Narrative:      The following orders were created for panel order CBC & Differential.  Procedure                               Abnormality         Status                     ---------                               -----------         ------                     Scan Slide[[id number]]                                       Final result               CBC Auto Differential[[id number]]        Abnormal            Final result                 Please view results for these tests on the individual orders.    Scan Slide [[id number]] Collected: 06/09/21 0640    Specimen: Blood Updated: 06/09/21 0725     Scan Slide --     Comment: See Manual Differential Results       CBC Auto Differential [[id number]]  (Abnormal) Collected: 06/09/21 0640    Specimen: Blood Updated: 06/09/21 0725     WBC 14.40 10*3/mm3      RBC 4.62 10*6/mm3      Hemoglobin 13.0 g/dL      Hematocrit 37.0 %      MCV 80.0 fL      MCH 28.1 pg      MCHC 35.1 g/dL      RDW 15.2 %      RDW-SD 42.9 fl      MPV 9.5 fL      Platelets 230 10*3/mm3     Narrative:      The previously reported component NRBC is no longer being reported. Previous result was 0.5 /100 WBC (Reference Range: 0.0-0.2 /100 WBC) on 6/9/2021 at 0651 EDT.    aPTT [[id number]]  (Normal) Collected: 06/09/21 0640    Specimen: Blood Updated: 06/09/21 0701     PTT 26.2 seconds     Protime-INR [[id number]]   (Normal) Collected: 06/09/21 0640    Specimen: Blood Updated: 06/09/21 0701     Protime 10.7 Seconds      INR 0.97    Magnesium [[id number]]  (Normal) Collected: 06/09/21 0246    Specimen: Blood Updated: 06/09/21 0355     Magnesium 1.7 mg/dL     Renal Function Panel [[id number]]  (Abnormal) Collected: 06/09/21 0246    Specimen: Blood Updated: 06/09/21 0321     Glucose 92 mg/dL      BUN 25 mg/dL      Creatinine 1.97 mg/dL      Sodium 136 mmol/L      Potassium 4.7 mmol/L      Comment: Slight hemolysis detected by analyzer. Results may be affected.        Chloride 103 mmol/L      CO2 22.0 mmol/L      Calcium 8.5 mg/dL      Albumin 2.80 g/dL      Phosphorus 4.1 mg/dL      Anion Gap 11.0 mmol/L      BUN/Creatinine Ratio 12.7     eGFR Non African Amer 34 mL/min/1.73     Narrative:      GFR Normal >60  Chronic Kidney Disease <60  Kidney Failure <15      SCANNED - LABS [[id number]] Resulted: 06/04/21     Updated: 06/09/21 0111          No results found for: HGBA1C  Results from last 7 days   Lab Units 06/09/21 0640   INR  0.97           No results found for: LIPASE  Lab Results   Component Value Date    CHOL 166 06/05/2021    CHLPL 278 (H) 12/31/2019    TRIG 308 (H) 06/05/2021    HDL 22 (L) 06/05/2021    LDL 92 06/05/2021       No results found for: INTRAOP, PREDX, FINALDX, COMDX    Microbiology Results (last 10 days)       Procedure Component Value - Date/Time    COVID PRE-OP / PRE-PROCEDURE SCREENING ORDER (NO ISOLATION) - Swab, Nasopharynx [[id number]]  (Normal) Collected: 06/07/21 0633    Lab Status: Final result Specimen: Swab from Nasopharynx Updated: 06/07/21 0731    Narrative:      The following orders were created for panel order COVID PRE-OP / PRE-PROCEDURE SCREENING ORDER (NO ISOLATION) - Swab, Nasopharynx.  Procedure                               Abnormality         Status                     ---------                               -----------         ------                      COVID-19,CEPHEID,COR/ERVIN...[[id number]]  Normal              Final result                 Please view results for these tests on the individual orders.    COVID-19,CEPHEID,COR/ERVIN/PAD/YANIV IN-HOUSE(OR EMERGENT/ADD-ON),NP SWAB IN TRANSPORT MEDIA 3-4 HR TAT, RT-PCR - Swab, Nasopharynx [[id number]]  (Normal) Collected: 06/07/21 0633    Lab Status: Final result Specimen: Swab from Nasopharynx Updated: 06/07/21 0731     COVID19 Not Detected    Narrative:      Fact sheet for providers: https://www.fda.gov/media/901462/download     Fact sheet for patients: https://www.fda.gov/media/086042/download  Fact sheet for providers: https://www.fda.gov/media/191475/download     Fact sheet for patients: https://www.fda.gov/media/137425/download            ECG/EMG Results (most recent)       Procedure Component Value Units Date/Time    SCANNED EKG [[id number]] Resulted: 06/04/21     Updated: 06/08/21 2201    Adult Transthoracic Echo Complete W/ Cont if Necessary Per Protocol [[id number]] Collected: 06/09/21 1505     Updated: 06/09/21 1733     BSA 2.2 m^2      RVIDd 2.5 cm      IVSd 1.3 cm      LVIDd 3.7 cm      LVIDs 2.6 cm      LVPWd 1.2 cm      IVS/LVPW 1.1     FS 30.8 %      EDV(Teich) 59.9 ml      ESV(Teich) 24.5 ml      EF(Teich) 59.2 %      EDV(cubed) 52.6 ml      ESV(cubed) 17.4 ml      EF(cubed) 66.8 %      LV mass(C)d 154.2 grams      LV mass(C)dI 69.6 grams/m^2      SV(Teich) 35.4 ml      SI(Teich) 16.0 ml/m^2      SV(cubed) 35.1 ml      SI(cubed) 15.9 ml/m^2      Ao root diam 3.2 cm      Ao root area 8.0 cm^2      ACS 2.3 cm      LVOT diam 2.3 cm      LVOT area 4.0 cm^2      EDV(MOD-sp4) 99.3 ml      ESV(MOD-sp4) 28.9 ml      EF(MOD-sp4) 70.9 %      SV(MOD-sp4) 70.3 ml      SI(MOD-sp4) 31.7 ml/m^2      Ao root area (BSA corrected) 1.4     LV Emery Vol (BSA corrected) 44.8 ml/m^2      LV Sys Vol (BSA corrected) 13.1 ml/m^2      MV E max radha 83.9 cm/sec      MV A max radha 114.7 cm/sec      MV E/A 0.73     MV V2 max 120.1 cm/sec       MV max PG 5.8 mmHg      MV V2 mean 72.7 cm/sec      MV mean PG 2.4 mmHg      MV V2 VTI 32.5 cm      MVA(VTI) 2.9 cm^2      MV dec slope 371.3 cm/sec^2      MV dec time 0.23 sec      Ao pk tal 138.7 cm/sec      Ao max PG 7.7 mmHg      Ao max PG (full) 2.0 mmHg      Ao V2 mean 90.6 cm/sec      Ao mean PG 3.8 mmHg      Ao mean PG (full) 0.73 mmHg      Ao V2 VTI 23.8 cm      PATTI(I,A) 3.9 cm^2      PATTI(I,D) 3.9 cm^2      PATTI(V,A) 3.4 cm^2      PATTI(V,D) 3.4 cm^2      LV V1 max PG 5.7 mmHg      LV V1 mean PG 3.1 mmHg      LV V1 max 119.2 cm/sec      LV V1 mean 80.5 cm/sec      LV V1 VTI 23.4 cm      SV(Ao) 190.8 ml      SI(Ao) 86.1 ml/m^2      SV(LVOT) 93.5 ml      SI(LVOT) 42.2 ml/m^2      PA V2 max 76.6 cm/sec      PA max PG 2.3 mmHg      PA max PG (full) 0.3 mmHg      PA V2 mean 55.8 cm/sec      PA mean PG 1.4 mmHg      PA mean PG (full) 0.38 mmHg      PA V2 VTI 15.2 cm      PA acc time 0.16 sec      RV V1 max PG 2.1 mmHg      RV V1 mean PG 1.1 mmHg      RV V1 max 71.6 cm/sec      RV V1 mean 47.4 cm/sec      RV V1 VTI 18.4 cm      TR max tal 131.0 cm/sec      RVSP(TR) 9.9 mmHg      RAP systole 3.0 mmHg      PA pr(Accel) 6.3 mmHg      Pulm Sys Tal 41.1 cm/sec      Pulm Emery Atl 27.0 cm/sec      Pulm S/D 1.5     Pulm A Revs Dur 0.12 sec      Pulm A Revs Tal 25.3 cm/sec       CV ECHO HANNAH - BZI_BMI 34.9 kilograms/m^2       CV ECHO HANNAH - BSA(HAYCOCK) 2.3 m^2       CV ECHO HANNAH - BZI_METRIC_WEIGHT 107.0 kg      BH CV ECHO HANNAH - BZI_METRIC_HEIGHT 175.3 cm      EF(MOD-bp) 70.0 %      LA dimension(2D) 4.1 cm             Results for orders placed during the hospital encounter of 06/04/21    Duplex Vein Mapping Lower Extremity - Bilateral CAR    Interpretation Summary  · Right greater saphenous vein above knee: with adequate size.  Right greater saphenous vein below knee: with adequate size.  Left greater saphenous vein above knee: with inadequate size.  Left greater saphenous vein below knee: with inadequate  size.      Results for orders placed in visit on 09/24/19    SCANNED - ECHOCARDIOGRAM      US Renal Bilateral    Result Date: 6/5/2021  1. No evidence of acute urinary obstruction. 2. Enlarged prostate. Electronically signed by:  Jesu Spicer M.D.  6/5/2021 6:34 PM          Xrays, labs reviewed personally by physician.    Medication Review:   I have reviewed the patient's current medication list      Scheduled Meds  Acetylcysteine, 600 mg, Oral, BID  amLODIPine, 10 mg, Oral, Daily  aspirin, 81 mg, Oral, Daily  atorvastatin, 40 mg, Oral, Daily  clopidogrel, 75 mg, Oral, Daily  docusate sodium, 100 mg, Oral, Daily  escitalopram, 10 mg, Oral, Daily  heparin (porcine), 5,000 Units, Subcutaneous, Q12H  hydrALAZINE, 50 mg, Oral, BID  insulin glargine, 10 Units, Subcutaneous, Daily With Dinner  insulin lispro, 0-14 Units, Subcutaneous, TID AC  insulin lispro, 5 Units, Subcutaneous, TID With Meals  levETIRAcetam, 500 mg, Oral, BID  levothyroxine, 25 mcg, Oral, Q AM  losartan, 50 mg, Oral, Q24H  metoprolol succinate XL, 50 mg, Oral, Q24H  povidone-iodine, , Topical, Daily  sodium chloride, 10 mL, Intravenous, Q12H  tamsulosin, 0.4 mg, Oral, Nightly        Meds Infusions  sodium chloride, 1-3 mL/kg/hr  sodium chloride, 100 mL/hr, Last Rate: 100 mL/hr (06/09/21 0732)  sodium chloride, 75 mL/hr, Last Rate: 75 mL/hr (06/09/21 1027)        Meds PRN    acetaminophen    atropine    dextrose    dextrose    glucagon (human recombinant)    hydrALAZINE    insulin lispro **AND** insulin lispro    ondansetron    sodium chloride    sodium chloride        Assessment/Plan   Assessment/Plan     Active Hospital Problems    Diagnosis  POA    **Acute kidney injury superimposed on chronic kidney disease (CMS/HCC) [N17.9, N18.9]  Yes    Hypertensive emergency [I16.1]  Yes    Elevated troponin level [R77.8]  Yes    Abnormal nuclear stress test [R94.39]  Unknown      Resolved Hospital Problems   No resolved problems to display.       MEDICAL  DECISION MAKING COMPLEXITY BY PROBLEM:     Hypertensive emergency, resolved  Essential HTN  - patient reports noncompliance with home meds due to reported hypotension   - continue amlodipine, metoprolol and hydralazine  -Benazepril and HCTZ held due to DYLON  -Nephrology consulted and added losartan 6/7     Acute Kidney Injury due to prerenal azotemia from medication (ACE inhibitor and HCTZ)  CKD stage 3b due to hypertension  - baseline Cr 1.3-1.6  - Cr trending down with IV fluids   - Avoid nephrotoxic medications  - nephrology consulting    Leukocytosis, likely reactive     Coronary artery disease s/p PCI and  stents  - Continue aspirin and Plavix, pt reports he has not been taking daily      Uncontrolled IDDM with hyperglycemia and retinopathy  - Hold Metformin  - Hgb a1c 6.4  - continue basal and bolus insulin  - SSI    Hyperlipidemia  - continue statin  - HDL 22, LDL 92, Triglycerides 308      Hypothyroidism  - pt states he stopped taking thyroid hormone  - TSH 3.74, free T4 0.98     Hx of CVA with with residual vision loss  -CT head negative for evidence of acute intracranial injury;  encephalomalacia in the right occipital and temporal  lobes consistent with old infarct  -Continue aspirin, Plavix and atorvastatin     Seizure disorder   - continue Keppra  - pt reports he may have had a seizure prior to admission, no seizure activity since admission      Depression, chronic and uncontrolled  -Patient reportedly stopped taking home medications 2 months ago   (pt reports lexapro was working for the last 3 years, but effectiveness had declined prior to stopping it 2 months ago)  -Resume Lexapro monitor    BPH with bladder outlet obstruction  -Patient had discontinued flomax due to lightheadedness  - resume Flomax    DVT Prophylaxis  - heparin         VTE Prophylaxis -   Mechanical Order History:       None          Pharmalogical Order History:        Ordered     Dose Route Frequency Stop    06/05/21 0027  heparin  (porcine) 5000 UNIT/ML injection 5,000 Units  Status:  Discontinued      5,000 Units SC Every 8 Hours Scheduled 06/05/21 0944    06/05/21 0944  heparin (porcine) 5000 UNIT/ML injection 5,000 Units      5,000 Units SC Every 12 Hours Scheduled --    06/04/21 2311  heparin (porcine) 5000 UNIT/ML injection 5,000 Units  Status:  Discontinued      5,000 Units SC Every 8 Hours Scheduled 06/05/21 0027                      Code Status -   Code Status and Medical Interventions:   Ordered at: 06/04/21 2311     Code Status:    CPR     Medical Interventions (Level of Support Prior to Arrest):    Full             Discharge Planning  Pending progress         Electronically signed by Caty Marroquin MD, 06/09/21, 19:28 EDT.  [facility] Hospitalist Team

## 2021-06-09 NOTE — PLAN OF CARE
Goal Outcome Evaluation:  Plan of Care Reviewed With: patient        Progress: no change   The patient is to have a heart cath at 1000 today. He has slept on and off throughout the night and appears to be in good spirits. The patient only needed his blood pressure medicated once this shift. Patient appears to be resting comfortably at this time, will continue to monitor.

## 2021-06-09 NOTE — PLAN OF CARE
Goal Outcome Evaluation:              Outcome Summary: Heart cath today w/ multiple blockages. Surgery consult made and w/u being completed. BPs improving. IP rehab rec'd. Per son, Jarvis, LTC is likely what pt will need as he is unable to manage comorbidities and medications independently.

## 2021-06-09 NOTE — PROGRESS NOTES
"   LOS: 5 days    Patient Care Team:  Marizol Puentes MD as PCP - General (Family Medicine)      Subjective     Patient seen and examined this morning.  Resting comfortably denies any chest pain, shortness of breath, nausea or vomiting    Objective     Vital Sign Min/Max for last 24 hours  Temp:  [98 °F (36.7 °C)-98.5 °F (36.9 °C)] 98 °F (36.7 °C)  Heart Rate:  [73-78] 74  Resp:  [16-18] 16  BP: (124-171)/(61-87) 147/83                       Flowsheet Rows      First Filed Value   Admission Height  175.3 cm (69\") Documented at 06/04/2021 2120   Admission Weight  107 kg (234 lb 12.6 oz) Documented at 06/04/2021 2120          No intake/output data recorded.  I/O last 3 completed shifts:  In: 1080 [P.O.:1080]  Out: 1400 [Urine:1400]    Physical Exam:  Physical Exam    General.  Awake, alert  Head.  Atraumatic, normocephalic  Neck.  Supple.  No JVD  Respiratory.  Clear to auscultation bilaterally.  No wheezing or rhonchi  Cardiovascular.  Normal rate.  Regular rhythm.  S1, S2  Abdomen.  Soft, obese, nontender.  No organomegaly.  Bowel sounds present  Extremities.  No edema.  Pulses palpable  Neurological.  No focal deficit       LABS:  Lab Results   Component Value Date    CALCIUM 8.5 (L) 06/09/2021    PHOS 4.1 06/09/2021     Results from last 7 days   Lab Units 06/09/21  0640 06/09/21  0246 06/08/21  1501 06/08/21  0250 06/06/21  0511   MAGNESIUM mg/dL  --  1.7  --   --   --    SODIUM mmol/L  --  136 136 136 138   POTASSIUM mmol/L  --  4.7 4.5 4.2 4.0   CHLORIDE mmol/L  --  103 106 106 107   CO2 mmol/L  --  22.0 22.0 18.0* 20.0*   BUN mg/dL  --  25* 22 22 24*   CREATININE mg/dL  --  1.97* 1.76* 1.66* 1.85*   GLUCOSE mg/dL  --  92 143* 98 144*   CALCIUM mg/dL  --  8.5* 8.2* 8.3* 7.8*   WBC 10*3/mm3 14.40*  --  10.10 11.20* 10.00   HEMOGLOBIN g/dL 13.0  --  12.5* 12.9* 12.0*   PLATELETS 10*3/mm3 230  --  222 190 179   ALT (SGPT) U/L  --   --   --   --  7   AST (SGOT) U/L  --   --   --   --  13     Lab Results "   Component Value Date    CKTOTAL 253 08/23/2018    TROPONINT 0.366 (C) 06/05/2021     Estimated Creatinine Clearance: 45.1 mL/min (A) (by C-G formula based on SCr of 1.97 mg/dL (H)).      Brief Urine Lab Results  (Last result in the past 365 days)      Color   Clarity   Blood   Leuk Est   Nitrite   Protein   CREAT   Urine HCG        06/05/21 1359             227.3       06/05/21 1359 Yellow Cloudy  Comment:  Result checked  Moderate (2+) Negative Negative >=300 mg/dL (3+)             WEIGHTS:     Wt Readings from Last 1 Encounters:   06/09/21 0516 107 kg (236 lb 1.8 oz)   06/08/21 0559 107 kg (235 lb 7.2 oz)   06/07/21 0500 110 kg (242 lb 8.1 oz)   06/06/21 0553 107 kg (235 lb 10.8 oz)   06/04/21 2120 107 kg (234 lb 12.6 oz)       [MAR Hold] Acetylcysteine, 600 mg, Oral, BID  amLODIPine, 10 mg, Oral, Daily  [MAR Hold] aspirin, 81 mg, Oral, Daily  [MAR Hold] atorvastatin, 40 mg, Oral, Daily  [MAR Hold] clopidogrel, 75 mg, Oral, Daily  [MAR Hold] docusate sodium, 100 mg, Oral, Daily  [MAR Hold] escitalopram, 10 mg, Oral, Daily  fentaNYL citrate (PF), 25 mcg, Intravenous, Once  [MAR Hold] heparin (porcine), 5,000 Units, Subcutaneous, Q12H  hydrALAZINE, 50 mg, Oral, BID  [MAR Hold] insulin glargine, 10 Units, Subcutaneous, Daily With Dinner  [MAR Hold] insulin lispro, 0-14 Units, Subcutaneous, TID AC  [MAR Hold] insulin lispro, 5 Units, Subcutaneous, TID With Meals  levETIRAcetam, 500 mg, Oral, BID  [MAR Hold] levothyroxine, 25 mcg, Oral, Q AM  losartan, 50 mg, Oral, Q24H  metoprolol succinate XL, 50 mg, Oral, Q24H  midazolam, 1 mg, Intravenous, Once  [MAR Hold] sodium chloride, 10 mL, Intravenous, Q12H  [MAR Hold] tamsulosin, 0.4 mg, Oral, Nightly      sodium chloride, 1-3 mL/kg/hr  sodium chloride, 100 mL/hr, Last Rate: 100 mL/hr (06/09/21 7031)        Assessment/Plan       1.Acute kidney injury on CKD 3  Creatinine slightly increased this morning but patient have underlying chronic kidney disease with significant  proteinuria, secondary to diabetic glomerulosclerosis  Electrolytes, volume status okay  Patient going for cardiac catheter today.  Explained the risk of contrast-induced nephropathy patient in detail and the sound understanding  Started IV hydration and oral Mucomyst    2.Hypertension  Blood pressure improving  Continue current antihypertensives  Stop hydralazine or Flomax if blood pressure drops or patient develops dizziness    3.  Proteinuria  Secondary to diabetic glomerulosclerosis  Continue ARB    4.Coronary artery disease  Patient going for cardiac catheter today      Sherman Will MD  06/09/21  09:30 EDT

## 2021-06-09 NOTE — NURSING NOTE
Pt seen today for diabetic foot ulcer to left great toe.      Pt not able to give hx at this time/ attempted to awaken and went back to sleep. Per chart pt has hx of DM, and CAD.     Left foot elongated/thickened tonails.  Foot warm, cap refill wnl, faint pedal pulse palpated.  There is a linear excoriation to the dorsal foot that is scabbed.      Left great toe ulcer measures 1x1x0.1cm covered with dry stable exchar. No drainage at this time. periwound clear intact.     Recommendations:  Left great toe diabetic foot ulcer: paint with betadine cover with dry gauze/tape daily.     Pt should follow up with podiatry for toenail care and wound as outpatient.

## 2021-06-09 NOTE — THERAPY TREATMENT NOTE
Subjective: Pt agreeable to therapeutic plan of care. Pt confirms that he is 80-90% blind.    Objective:     Bed mobility - SBA supine to sitting EOB  Transfers - SBA STS from bed and to and from toilet    Ambulation - 45 feet CGA ; guarded, slow gait speed, wide JOSH; distance limited by fatigue; speed and balance decreased as fatigue increased  Mild to moderate postural instability during gait; postural instability increased with head turns    Balance CGA to min A; min A for correction of posterior loss of balance when opening bathroom door    BP  127/22 mmHg sitting EOB  134/63 mmHg at end of treatment    HR  80 bpm at beginning of treatment  78 bpm at end of treatment    Pain: 0 VAS  Education: Provided education on importance of mobility and skilled verbal / tactile cueing throughout intervention.     Assessment: Isaias Molina presents with functional mobility impairments which indicate the need for skilled intervention. Pt SBA for bed mobility supine to sitting EOB. Pt SBA for STS transfer from sitting EOB and STS from toilet. Pt CGA for ambulation due to mild to moderate postural instability during gait that increased with head turns and fatigue; gait speed also decreased with fatigue. Ambulated 45' and limited by fatigue this date. Pt returned to supine with HOB elevated. Tolerating session today without incident. Will continue to follow and progress as tolerated.     Recommended for TUG and Lyons Balance Scale assessment for balance impairments next session.    Plan/Recommendations:   Pt would benefit from Inpatient Rehabilitation placement at discharge from facility and requires no DME at discharge.   Pt desires Inpatient Rehabilitation placement at discharge. Pt cooperative; agreeable to therapeutic recommendations and plan of care.     Recommendation changed to IP rehab from home health due to balance deficits with gait and family preference.    Basic Mobility 6-click:  Rollin = Total, A  lot = 2, A little = 3; 4 = None  Supine>Sit:   1 = Total, A lot = 2, A little = 3; 4 = None   Sit>Stand with arms:  1 = Total, A lot = 2, A little = 3; 4 = None  Bed>Chair:   1 = Total, A lot = 2, A little = 3; 4 = None  Ambulate in room:  1 = Total, A lot = 2, A little = 3; 4 = None  3-5 Steps with railin = Total, A lot = 2, A little = 3; 4 = None  Score: 16    Post-Tx Position: Supine with HOB Elevated, Alarms activated and Call light and personal items within reach     PPE: gloves, surgical mask, eyewear protection

## 2021-06-09 NOTE — PROGRESS NOTES
CARDIOLOGY FOLLOW-UP PROGRESS NOTE      Reason for follow-up:    Elevated troponin    -1.0 at outlying facility, 0.233 here  CAD with prior PCI to LCx, RCA 2011  Uncontrolled HTN:  Pt has stopped all antihypertensive agents for last month  DM  Dyslipidemia  Hx Seizure disorder with possible Seizure  Non compliance with medical therapy     Attending: Caty Marroquin MD      SUBJECTIVE:    No chest pain or shortness of breath.  Lying in bed comfortably     Review of Systems   General: denies fever, chills, anorexia, weight loss  Eyes: denies blurring, diplopia  Ear/Nose/Throat: denies ear pain, nosebleeds, hoarseness  Cardiovascular: See HPI  Respiratory: denies excessive sputum, hemoptysis, wheezing  Gastrointestinal: denies nausea, vomiting, change in bowel habits, abdominal pain  Genitourinary: denies dysuria and hematuria  Musculoskeletal: denies back pain, joint pain, joint swelling, muscle cramps, weakness  Skin: denies rashes, itching, suspicious lesions  Neurologic: denies focal neuro deficits  Psychiatric: denies depression, anxiety  Endocrine: denies cold intolerance, heat intolerance  Hematologic/Lymphatic: denies abnormal bruising, bleeding  Allergic/Immunologic: denies urticaria or persistent infections      Allergies: Shellfish allergy    Scheduled Meds:[MAR Hold] Acetylcysteine, 600 mg, Oral, BID  amLODIPine, 10 mg, Oral, Daily  [MAR Hold] aspirin, 81 mg, Oral, Daily  [MAR Hold] atorvastatin, 40 mg, Oral, Daily  [MAR Hold] clopidogrel, 75 mg, Oral, Daily  [MAR Hold] docusate sodium, 100 mg, Oral, Daily  [MAR Hold] escitalopram, 10 mg, Oral, Daily  fentaNYL citrate (PF), 25 mcg, Intravenous, Once  [MAR Hold] heparin (porcine), 5,000 Units, Subcutaneous, Q12H  hydrALAZINE, 50 mg, Oral, BID  [MAR Hold] insulin glargine, 10 Units, Subcutaneous, Daily With Dinner  [MAR Hold] insulin lispro, 0-14 Units, Subcutaneous, TID AC  [MAR Hold] insulin lispro, 5 Units, Subcutaneous, TID With Meals  levETIRAcetam, 500  mg, Oral, BID  [MAR Hold] levothyroxine, 25 mcg, Oral, Q AM  losartan, 50 mg, Oral, Q24H  metoprolol succinate XL, 50 mg, Oral, Q24H  midazolam, 1 mg, Intravenous, Once  povidone-iodine, , Topical, Daily  [MAR Hold] sodium chloride, 10 mL, Intravenous, Q12H  [MAR Hold] tamsulosin, 0.4 mg, Oral, Nightly        Continuous Infusions:sodium chloride, 1-3 mL/kg/hr  sodium chloride, 100 mL/hr, Last Rate: 100 mL/hr (06/09/21 0732)  sodium chloride, 75 mL/hr        PRN Meds:.•  [MAR Hold] acetaminophen  •  atropine  •  [MAR Hold] dextrose  •  [MAR Hold] dextrose  •  [MAR Hold] glucagon (human recombinant)  •  hydrALAZINE  •  [MAR Hold] insulin lispro **AND** [MAR Hold] insulin lispro  •  [MAR Hold] ondansetron  •  [MAR Hold] sodium chloride  •  sodium chloride    Objective     Vital Signs  Vitals:    06/08/21 2133 06/09/21 0207 06/09/21 0516 06/09/21 0913   BP: 156/71 171/87 139/70 147/83   BP Location: Right arm Right arm Right arm    Patient Position: Lying Lying Lying    Pulse: 75 75 77 74   Resp: 18 18 16 16   Temp: 98.2 °F (36.8 °C) 98 °F (36.7 °C) 98 °F (36.7 °C)    TempSrc: Oral Oral Oral    SpO2: 96% 96% 93% 98%   Weight:   107 kg (236 lb 1.8 oz)    Height:         Wt Readings from Last 1 Encounters:   06/09/21 107 kg (236 lb 1.8 oz)       Physical Exam:     General Appearance:    Alert, oriented, no acute distress   Neck:  Supple without JVD or bruit   Lungs:    Grossly clear with adequate airflow bilaterally    Heart:    Regular rate and rhythm, normal S1 and S2, no            murmur, no gallop, no rub, no click   Chest Wall/Thorax:    No abnormalities observed   Abdomen:     Normal bowel sounds, no masses, no organomegaly, soft        non-tender, non-distended, no guarding, no rebound                tenderness   Extremities:  Range of motion adequate, no edema, no cyanosis, no             redness   Pulses:   Pulses palpable and equal bilaterally   Skin:   No bleeding, bruising or rash   Neurologic:  No focal  deficits               Results Review:     CBC    Results from last 7 days   Lab Units 06/09/21  0640 06/08/21  1501 06/08/21  0250 06/06/21  0511 06/05/21  0000   WBC 10*3/mm3 14.40* 10.10 11.20* 10.00 11.00*   HEMOGLOBIN g/dL 13.0 12.5* 12.9* 12.0* 11.6*   PLATELETS 10*3/mm3 230 222 190 179 202     BMP   Results from last 7 days   Lab Units 06/09/21  0246 06/08/21  1501 06/08/21  0250 06/07/21  0234 06/06/21  0511 06/05/21  0001   SODIUM mmol/L 136 136 136 136 138 136   POTASSIUM mmol/L 4.7 4.5 4.2 4.1 4.0 3.8   CHLORIDE mmol/L 103 106 106 107 107 104   CO2 mmol/L 22.0 22.0 18.0* 19.0* 20.0* 21.0*   BUN mg/dL 25* 22 22 24* 24* 27*   CREATININE mg/dL 1.97* 1.76* 1.66* 1.94* 1.85* 2.33*   GLUCOSE mg/dL 92 143* 98 137* 144* 219*   MAGNESIUM mg/dL 1.7  --   --   --   --   --    PHOSPHORUS mg/dL 4.1  --  3.8  --   --  3.7     HbA1C   Lab Results   Component Value Date    HGBA1C 6.4 (H) 06/05/2021    HGBA1C 7.1 (H) 12/31/2019    HGBA1C 7.6 (H) 07/08/2019     CMP   Results from last 7 days   Lab Units 06/09/21  0246 06/08/21  1501 06/08/21  0250 06/07/21  0234 06/06/21  0511 06/05/21  0001   SODIUM mmol/L 136 136 136 136 138 136   POTASSIUM mmol/L 4.7 4.5 4.2 4.1 4.0 3.8   CHLORIDE mmol/L 103 106 106 107 107 104   CO2 mmol/L 22.0 22.0 18.0* 19.0* 20.0* 21.0*   BUN mg/dL 25* 22 22 24* 24* 27*   CREATININE mg/dL 1.97* 1.76* 1.66* 1.94* 1.85* 2.33*   GLUCOSE mg/dL 92 143* 98 137* 144* 219*   ALBUMIN g/dL 2.80*  --  2.70*  --  2.80*  --    BILIRUBIN mg/dL  --   --   --   --  0.5  --    ALK PHOS U/L  --   --   --   --  102  --    AST (SGOT) U/L  --   --   --   --  13  --    ALT (SGPT) U/L  --   --   --   --  7  --      Radiology(recent) No radiology results for the last day    Cardiac Studies:  No new studies; telemetry shows sinus rhythm    Medication Review:   Scheduled Meds:[MAR Hold] Acetylcysteine, 600 mg, Oral, BID  amLODIPine, 10 mg, Oral, Daily  [MAR Hold] aspirin, 81 mg, Oral, Daily  [MAR Hold] atorvastatin, 40 mg,  Oral, Daily  [MAR Hold] clopidogrel, 75 mg, Oral, Daily  [MAR Hold] docusate sodium, 100 mg, Oral, Daily  [MAR Hold] escitalopram, 10 mg, Oral, Daily  fentaNYL citrate (PF), 25 mcg, Intravenous, Once  [MAR Hold] heparin (porcine), 5,000 Units, Subcutaneous, Q12H  hydrALAZINE, 50 mg, Oral, BID  [MAR Hold] insulin glargine, 10 Units, Subcutaneous, Daily With Dinner  [MAR Hold] insulin lispro, 0-14 Units, Subcutaneous, TID AC  [MAR Hold] insulin lispro, 5 Units, Subcutaneous, TID With Meals  levETIRAcetam, 500 mg, Oral, BID  [MAR Hold] levothyroxine, 25 mcg, Oral, Q AM  losartan, 50 mg, Oral, Q24H  metoprolol succinate XL, 50 mg, Oral, Q24H  midazolam, 1 mg, Intravenous, Once  povidone-iodine, , Topical, Daily  [MAR Hold] sodium chloride, 10 mL, Intravenous, Q12H  [MAR Hold] tamsulosin, 0.4 mg, Oral, Nightly      Continuous Infusions:sodium chloride, 1-3 mL/kg/hr  sodium chloride, 100 mL/hr, Last Rate: 100 mL/hr (06/09/21 0732)  sodium chloride, 75 mL/hr      PRN Meds:.•  [MAR Hold] acetaminophen  •  atropine  •  [MAR Hold] dextrose  •  [MAR Hold] dextrose  •  [MAR Hold] glucagon (human recombinant)  •  hydrALAZINE  •  [MAR Hold] insulin lispro **AND** [MAR Hold] insulin lispro  •  [MAR Hold] ondansetron  •  [MAR Hold] sodium chloride  •  sodium chloride    Assessment:  levated troponin    -1.0 at outlying facility, 0.233 here  CAD with prior PCI to LCx, RCA 2011  Uncontrolled HTN:  Pt has stopped all antihypertensive agents for last month  DM  Dyslipidemia  Hx Seizure disorder with possible Seizure  Non compliance with medical therapy  Combined hyperlipidemia      Plan:  Patient presented after a seizure  Patient did not have any chest pain but had elevated troponin consistent with is a non-STEMI or versus renal insufficiency  Patient had previous stent placement to the circumflex artery and RCA  Patient blood pressure very high and his medications have been adjusted  Patient sugar levels and lipid levels are followed  by the primary care doctor  Patient is noncompliant with medications also.  Patient had a stress test which was abnormal  Patient had a cardiac catheterization which showed severe three-vessel coronary artery disease  Patient's films will be reviewed by the surgeons  Patient had renal insufficiency and was cleared by the nephrologist for the procedure and renal function will be followed.  Eligio Robles MD  06/09/21  10:13 EDT      This report was generated using the Dragon voice recognition system.

## 2021-06-10 LAB
ALBUMIN SERPL-MCNC: 3.1 G/DL (ref 3.5–5.2)
ANION GAP SERPL CALCULATED.3IONS-SCNC: 9 MMOL/L (ref 5–15)
BH CV ECHO MEAS - ACS: 2.3 CM
BH CV ECHO MEAS - AO MAX PG (FULL): 2 MMHG
BH CV ECHO MEAS - AO MAX PG: 7.7 MMHG
BH CV ECHO MEAS - AO MEAN PG (FULL): 0.73 MMHG
BH CV ECHO MEAS - AO MEAN PG: 3.8 MMHG
BH CV ECHO MEAS - AO ROOT AREA (BSA CORRECTED): 1.4
BH CV ECHO MEAS - AO ROOT AREA: 8 CM^2
BH CV ECHO MEAS - AO ROOT DIAM: 3.2 CM
BH CV ECHO MEAS - AO V2 MAX: 138.7 CM/SEC
BH CV ECHO MEAS - AO V2 MEAN: 90.6 CM/SEC
BH CV ECHO MEAS - AO V2 VTI: 23.8 CM
BH CV ECHO MEAS - AVA(I,A): 3.9 CM^2
BH CV ECHO MEAS - AVA(I,D): 3.9 CM^2
BH CV ECHO MEAS - AVA(V,A): 3.4 CM^2
BH CV ECHO MEAS - AVA(V,D): 3.4 CM^2
BH CV ECHO MEAS - BSA(HAYCOCK): 2.3 M^2
BH CV ECHO MEAS - BSA: 2.2 M^2
BH CV ECHO MEAS - BZI_BMI: 34.9 KILOGRAMS/M^2
BH CV ECHO MEAS - BZI_METRIC_HEIGHT: 175.3 CM
BH CV ECHO MEAS - BZI_METRIC_WEIGHT: 107 KG
BH CV ECHO MEAS - EDV(CUBED): 52.6 ML
BH CV ECHO MEAS - EDV(MOD-SP4): 99.3 ML
BH CV ECHO MEAS - EDV(TEICH): 59.9 ML
BH CV ECHO MEAS - EF(CUBED): 66.8 %
BH CV ECHO MEAS - EF(MOD-SP4): 70.9 %
BH CV ECHO MEAS - EF(TEICH): 59.2 %
BH CV ECHO MEAS - ESV(CUBED): 17.4 ML
BH CV ECHO MEAS - ESV(MOD-SP4): 28.9 ML
BH CV ECHO MEAS - ESV(TEICH): 24.5 ML
BH CV ECHO MEAS - FS: 30.8 %
BH CV ECHO MEAS - IVS/LVPW: 1.1
BH CV ECHO MEAS - IVSD: 1.3 CM
BH CV ECHO MEAS - LA DIMENSION(2D): 4.1 CM
BH CV ECHO MEAS - LV DIASTOLIC VOL/BSA (35-75): 44.8 ML/M^2
BH CV ECHO MEAS - LV MASS(C)D: 154.2 GRAMS
BH CV ECHO MEAS - LV MASS(C)DI: 69.6 GRAMS/M^2
BH CV ECHO MEAS - LV MAX PG: 5.7 MMHG
BH CV ECHO MEAS - LV MEAN PG: 3.1 MMHG
BH CV ECHO MEAS - LV SYSTOLIC VOL/BSA (12-30): 13.1 ML/M^2
BH CV ECHO MEAS - LV V1 MAX: 119.2 CM/SEC
BH CV ECHO MEAS - LV V1 MEAN: 80.5 CM/SEC
BH CV ECHO MEAS - LV V1 VTI: 23.4 CM
BH CV ECHO MEAS - LVIDD: 3.7 CM
BH CV ECHO MEAS - LVIDS: 2.6 CM
BH CV ECHO MEAS - LVOT AREA: 4 CM^2
BH CV ECHO MEAS - LVOT DIAM: 2.3 CM
BH CV ECHO MEAS - LVPWD: 1.2 CM
BH CV ECHO MEAS - MV A MAX VEL: 114.7 CM/SEC
BH CV ECHO MEAS - MV DEC SLOPE: 371.3 CM/SEC^2
BH CV ECHO MEAS - MV DEC TIME: 0.23 SEC
BH CV ECHO MEAS - MV E MAX VEL: 83.9 CM/SEC
BH CV ECHO MEAS - MV E/A: 0.73
BH CV ECHO MEAS - MV MAX PG: 5.8 MMHG
BH CV ECHO MEAS - MV MEAN PG: 2.4 MMHG
BH CV ECHO MEAS - MV V2 MAX: 120.1 CM/SEC
BH CV ECHO MEAS - MV V2 MEAN: 72.7 CM/SEC
BH CV ECHO MEAS - MV V2 VTI: 32.5 CM
BH CV ECHO MEAS - MVA(VTI): 2.9 CM^2
BH CV ECHO MEAS - PA ACC TIME: 0.16 SEC
BH CV ECHO MEAS - PA MAX PG (FULL): 0.3 MMHG
BH CV ECHO MEAS - PA MAX PG: 2.3 MMHG
BH CV ECHO MEAS - PA MEAN PG (FULL): 0.38 MMHG
BH CV ECHO MEAS - PA MEAN PG: 1.4 MMHG
BH CV ECHO MEAS - PA PR(ACCEL): 6.3 MMHG
BH CV ECHO MEAS - PA V2 MAX: 76.6 CM/SEC
BH CV ECHO MEAS - PA V2 MEAN: 55.8 CM/SEC
BH CV ECHO MEAS - PA V2 VTI: 15.2 CM
BH CV ECHO MEAS - PULM A REVS DUR: 0.12 SEC
BH CV ECHO MEAS - PULM A REVS VEL: 25.3 CM/SEC
BH CV ECHO MEAS - PULM DIAS VEL: 27 CM/SEC
BH CV ECHO MEAS - PULM S/D: 1.5
BH CV ECHO MEAS - PULM SYS VEL: 41.1 CM/SEC
BH CV ECHO MEAS - RAP SYSTOLE: 3 MMHG
BH CV ECHO MEAS - RV MAX PG: 2.1 MMHG
BH CV ECHO MEAS - RV MEAN PG: 1.1 MMHG
BH CV ECHO MEAS - RV V1 MAX: 71.6 CM/SEC
BH CV ECHO MEAS - RV V1 MEAN: 47.4 CM/SEC
BH CV ECHO MEAS - RV V1 VTI: 18.4 CM
BH CV ECHO MEAS - RVDD: 2.5 CM
BH CV ECHO MEAS - RVSP: 9.9 MMHG
BH CV ECHO MEAS - SI(AO): 86.1 ML/M^2
BH CV ECHO MEAS - SI(CUBED): 15.9 ML/M^2
BH CV ECHO MEAS - SI(LVOT): 42.2 ML/M^2
BH CV ECHO MEAS - SI(MOD-SP4): 31.7 ML/M^2
BH CV ECHO MEAS - SI(TEICH): 16 ML/M^2
BH CV ECHO MEAS - SV(AO): 190.8 ML
BH CV ECHO MEAS - SV(CUBED): 35.1 ML
BH CV ECHO MEAS - SV(LVOT): 93.5 ML
BH CV ECHO MEAS - SV(MOD-SP4): 70.3 ML
BH CV ECHO MEAS - SV(TEICH): 35.4 ML
BH CV ECHO MEAS - TR MAX VEL: 131 CM/SEC
BUN SERPL-MCNC: 35 MG/DL (ref 8–23)
BUN/CREAT SERPL: 15.8 (ref 7–25)
CALCIUM SPEC-SCNC: 8.4 MG/DL (ref 8.6–10.5)
CHLORIDE SERPL-SCNC: 104 MMOL/L (ref 98–107)
CO2 SERPL-SCNC: 20 MMOL/L (ref 22–29)
CREAT SERPL-MCNC: 2.22 MG/DL (ref 0.76–1.27)
DEPRECATED RDW RBC AUTO: 41.6 FL (ref 37–54)
ERYTHROCYTE [DISTWIDTH] IN BLOOD BY AUTOMATED COUNT: 14.8 % (ref 12.3–15.4)
GFR SERPL CREATININE-BSD FRML MDRD: 30 ML/MIN/1.73
GLUCOSE BLDC GLUCOMTR-MCNC: 148 MG/DL (ref 70–105)
GLUCOSE BLDC GLUCOMTR-MCNC: 180 MG/DL (ref 70–105)
GLUCOSE BLDC GLUCOMTR-MCNC: 183 MG/DL (ref 70–105)
GLUCOSE BLDC GLUCOMTR-MCNC: 184 MG/DL (ref 70–105)
GLUCOSE SERPL-MCNC: 174 MG/DL (ref 65–99)
HCT VFR BLD AUTO: 35.4 % (ref 37.5–51)
HGB BLD-MCNC: 12.4 G/DL (ref 13–17.7)
MCH RBC QN AUTO: 27.9 PG (ref 26.6–33)
MCHC RBC AUTO-ENTMCNC: 35.1 G/DL (ref 31.5–35.7)
MCV RBC AUTO: 79.3 FL (ref 79–97)
PHOSPHATE SERPL-MCNC: 3.5 MG/DL (ref 2.5–4.5)
PLATELET # BLD AUTO: 241 10*3/MM3 (ref 140–450)
PMV BLD AUTO: 9.3 FL (ref 6–12)
POTASSIUM SERPL-SCNC: 5 MMOL/L (ref 3.5–5.2)
RBC # BLD AUTO: 4.46 10*6/MM3 (ref 4.14–5.8)
SODIUM SERPL-SCNC: 133 MMOL/L (ref 136–145)
WBC # BLD AUTO: 12.2 10*3/MM3 (ref 3.4–10.8)

## 2021-06-10 PROCEDURE — 63710000001 INSULIN LISPRO (HUMAN) PER 5 UNITS: Performed by: INTERNAL MEDICINE

## 2021-06-10 PROCEDURE — 99232 SBSQ HOSP IP/OBS MODERATE 35: CPT | Performed by: HOSPITALIST

## 2021-06-10 PROCEDURE — 82962 GLUCOSE BLOOD TEST: CPT

## 2021-06-10 PROCEDURE — 99232 SBSQ HOSP IP/OBS MODERATE 35: CPT | Performed by: INTERNAL MEDICINE

## 2021-06-10 PROCEDURE — 63710000001 INSULIN GLARGINE PER 5 UNITS: Performed by: INTERNAL MEDICINE

## 2021-06-10 PROCEDURE — 25010000002 HEPARIN (PORCINE) PER 1000 UNITS: Performed by: INTERNAL MEDICINE

## 2021-06-10 PROCEDURE — 80069 RENAL FUNCTION PANEL: CPT | Performed by: INTERNAL MEDICINE

## 2021-06-10 PROCEDURE — 85027 COMPLETE CBC AUTOMATED: CPT | Performed by: INTERNAL MEDICINE

## 2021-06-10 RX ADMIN — ATORVASTATIN CALCIUM 40 MG: 40 TABLET, FILM COATED ORAL at 09:25

## 2021-06-10 RX ADMIN — LEVOTHYROXINE SODIUM 25 MCG: 0.03 TABLET ORAL at 05:46

## 2021-06-10 RX ADMIN — ESCITALOPRAM OXALATE 10 MG: 10 TABLET ORAL at 09:25

## 2021-06-10 RX ADMIN — METOPROLOL SUCCINATE 50 MG: 50 TABLET, EXTENDED RELEASE ORAL at 09:26

## 2021-06-10 RX ADMIN — LOSARTAN POTASSIUM 50 MG: 50 TABLET, FILM COATED ORAL at 09:25

## 2021-06-10 RX ADMIN — Medication 10 ML: at 09:26

## 2021-06-10 RX ADMIN — INSULIN LISPRO 5 UNITS: 100 INJECTION, SOLUTION INTRAVENOUS; SUBCUTANEOUS at 13:21

## 2021-06-10 RX ADMIN — HEPARIN SODIUM 5000 UNITS: 5000 INJECTION INTRAVENOUS; SUBCUTANEOUS at 20:14

## 2021-06-10 RX ADMIN — INSULIN LISPRO 3 UNITS: 100 INJECTION, SOLUTION INTRAVENOUS; SUBCUTANEOUS at 13:21

## 2021-06-10 RX ADMIN — INSULIN LISPRO 3 UNITS: 100 INJECTION, SOLUTION INTRAVENOUS; SUBCUTANEOUS at 17:52

## 2021-06-10 RX ADMIN — Medication 600 MG: at 09:25

## 2021-06-10 RX ADMIN — TAMSULOSIN HYDROCHLORIDE 0.4 MG: 0.4 CAPSULE ORAL at 20:13

## 2021-06-10 RX ADMIN — HYDRALAZINE HYDROCHLORIDE 50 MG: 25 TABLET, FILM COATED ORAL at 20:13

## 2021-06-10 RX ADMIN — HYDRALAZINE HYDROCHLORIDE 50 MG: 25 TABLET, FILM COATED ORAL at 09:37

## 2021-06-10 RX ADMIN — INSULIN GLARGINE 10 UNITS: 100 INJECTION, SOLUTION SUBCUTANEOUS at 17:52

## 2021-06-10 RX ADMIN — ASPIRIN 81 MG: 81 TABLET, COATED ORAL at 09:25

## 2021-06-10 RX ADMIN — LEVETIRACETAM 500 MG: 500 TABLET ORAL at 20:13

## 2021-06-10 RX ADMIN — LEVETIRACETAM 500 MG: 500 TABLET ORAL at 09:25

## 2021-06-10 RX ADMIN — Medication 10 ML: at 20:13

## 2021-06-10 RX ADMIN — INSULIN LISPRO 5 UNITS: 100 INJECTION, SOLUTION INTRAVENOUS; SUBCUTANEOUS at 17:52

## 2021-06-10 RX ADMIN — INSULIN LISPRO 5 UNITS: 100 INJECTION, SOLUTION INTRAVENOUS; SUBCUTANEOUS at 09:26

## 2021-06-10 RX ADMIN — Medication 600 MG: at 20:13

## 2021-06-10 RX ADMIN — DOCUSATE SODIUM 100 MG: 100 CAPSULE, LIQUID FILLED ORAL at 09:25

## 2021-06-10 RX ADMIN — CLOPIDOGREL BISULFATE 75 MG: 75 TABLET ORAL at 09:25

## 2021-06-10 RX ADMIN — HEPARIN SODIUM 5000 UNITS: 5000 INJECTION INTRAVENOUS; SUBCUTANEOUS at 09:26

## 2021-06-10 RX ADMIN — POVIDONE-IODINE: 10 SOLUTION TOPICAL at 09:26

## 2021-06-10 RX ADMIN — AMLODIPINE BESYLATE 10 MG: 5 TABLET ORAL at 09:25

## 2021-06-10 NOTE — PROGRESS NOTES
CARDIOLOGY FOLLOW-UP PROGRESS NOTE      Reason for follow-up:    Elevated troponin    -1.0 at outlying facility, 0.233 here  CAD with prior PCI to LCx, RCA 2011  Uncontrolled HTN:  Pt has stopped all antihypertensive agents for last month  DM  Dyslipidemia  Hx Seizure disorder with possible Seizure  Non compliance with medical therapy     Attending: Caty Marroquin MD      SUBJECTIVE:    No chest pain or shortness of breath.  Lying in bed comfortably     Review of Systems   General: denies fever, chills, anorexia, weight loss  Eyes: denies blurring, diplopia  Ear/Nose/Throat: denies ear pain, nosebleeds, hoarseness  Cardiovascular: See HPI  Respiratory: denies excessive sputum, hemoptysis, wheezing  Gastrointestinal: denies nausea, vomiting, change in bowel habits, abdominal pain  Genitourinary: denies dysuria and hematuria  Musculoskeletal: denies back pain, joint pain, joint swelling, muscle cramps, weakness  Skin: denies rashes, itching, suspicious lesions  Neurologic: denies focal neuro deficits  Psychiatric: denies depression, anxiety  Endocrine: denies cold intolerance, heat intolerance  Hematologic/Lymphatic: denies abnormal bruising, bleeding  Allergic/Immunologic: denies urticaria or persistent infections      Allergies: Shellfish allergy    Scheduled Meds:Acetylcysteine, 600 mg, Oral, BID  amLODIPine, 10 mg, Oral, Daily  aspirin, 81 mg, Oral, Daily  atorvastatin, 40 mg, Oral, Daily  clopidogrel, 75 mg, Oral, Daily  docusate sodium, 100 mg, Oral, Daily  escitalopram, 10 mg, Oral, Daily  heparin (porcine), 5,000 Units, Subcutaneous, Q12H  hydrALAZINE, 50 mg, Oral, BID  insulin glargine, 10 Units, Subcutaneous, Daily With Dinner  insulin lispro, 0-14 Units, Subcutaneous, TID AC  insulin lispro, 5 Units, Subcutaneous, TID With Meals  levETIRAcetam, 500 mg, Oral, BID  levothyroxine, 25 mcg, Oral, Q AM  losartan, 50 mg, Oral, Q24H  metoprolol succinate XL, 50 mg, Oral, Q24H  povidone-iodine, , Topical,  Daily  sodium chloride, 10 mL, Intravenous, Q12H  tamsulosin, 0.4 mg, Oral, Nightly        Continuous Infusions:sodium chloride, 1-3 mL/kg/hr  sodium chloride, 100 mL/hr, Last Rate: 100 mL/hr (06/09/21 0732)  sodium chloride, 75 mL/hr, Last Rate: Stopped (06/09/21 1830)        PRN Meds:.•  acetaminophen  •  atropine  •  dextrose  •  dextrose  •  glucagon (human recombinant)  •  hydrALAZINE  •  insulin lispro **AND** insulin lispro  •  ondansetron  •  sodium chloride  •  sodium chloride    Objective     Vital Signs  Vitals:    06/10/21 0221 06/10/21 0453 06/10/21 0520 06/10/21 1027   BP: 158/70  149/80 162/83   BP Location: Right arm  Right arm    Patient Position: Lying  Lying    Pulse: 68  70 68   Resp: 16  16 17   Temp: 98.4 °F (36.9 °C)  97.6 °F (36.4 °C) 97.8 °F (36.6 °C)   TempSrc: Oral  Oral Oral   SpO2: 94%  95%    Weight:  107 kg (236 lb 8.9 oz)     Height:         Wt Readings from Last 1 Encounters:   06/10/21 107 kg (236 lb 8.9 oz)       Physical Exam:     General Appearance:    Alert, oriented, no acute distress   Neck:  Supple without JVD or bruit   Lungs:    Grossly clear with adequate airflow bilaterally    Heart:    Regular rate and rhythm, normal S1 and S2, no            murmur, no gallop, no rub, no click   Chest Wall/Thorax:    No abnormalities observed   Abdomen:     Normal bowel sounds, no masses, no organomegaly, soft        non-tender, non-distended, no guarding, no rebound                tenderness   Extremities:  Range of motion adequate, no edema, no cyanosis, no             redness   Pulses:   Pulses palpable and equal bilaterally   Skin:   No bleeding, bruising or rash   Neurologic:  No focal deficits               Results Review:     CBC    Results from last 7 days   Lab Units 06/10/21  0228 06/09/21  0640 06/08/21  1501 06/08/21  0250 06/06/21  0511 06/05/21  0000   WBC 10*3/mm3 12.20* 14.40* 10.10 11.20* 10.00 11.00*   HEMOGLOBIN g/dL 12.4* 13.0 12.5* 12.9* 12.0* 11.6*   PLATELETS  10*3/mm3 241 230 222 190 179 202     BMP   Results from last 7 days   Lab Units 06/10/21  0228 06/09/21  0246 06/08/21  1501 06/08/21  0250 06/07/21  0234 06/06/21  0511 06/05/21  0001   SODIUM mmol/L 133* 136 136 136 136 138 136   POTASSIUM mmol/L 5.0 4.7 4.5 4.2 4.1 4.0 3.8   CHLORIDE mmol/L 104 103 106 106 107 107 104   CO2 mmol/L 20.0* 22.0 22.0 18.0* 19.0* 20.0* 21.0*   BUN mg/dL 35* 25* 22 22 24* 24* 27*   CREATININE mg/dL 2.22* 1.97* 1.76* 1.66* 1.94* 1.85* 2.33*   GLUCOSE mg/dL 174* 92 143* 98 137* 144* 219*   MAGNESIUM mg/dL  --  1.7  --   --   --   --   --    PHOSPHORUS mg/dL 3.5 4.1  --  3.8  --   --  3.7     HbA1C   Lab Results   Component Value Date    HGBA1C 6.4 (H) 06/05/2021    HGBA1C 7.1 (H) 12/31/2019    HGBA1C 7.6 (H) 07/08/2019     CMP   Results from last 7 days   Lab Units 06/10/21  0228 06/09/21  0246 06/08/21  1501 06/08/21  0250 06/07/21  0234 06/06/21  0511 06/05/21  0001   SODIUM mmol/L 133* 136 136 136 136 138 136   POTASSIUM mmol/L 5.0 4.7 4.5 4.2 4.1 4.0 3.8   CHLORIDE mmol/L 104 103 106 106 107 107 104   CO2 mmol/L 20.0* 22.0 22.0 18.0* 19.0* 20.0* 21.0*   BUN mg/dL 35* 25* 22 22 24* 24* 27*   CREATININE mg/dL 2.22* 1.97* 1.76* 1.66* 1.94* 1.85* 2.33*   GLUCOSE mg/dL 174* 92 143* 98 137* 144* 219*   ALBUMIN g/dL 3.10* 2.80*  --  2.70*  --  2.80*  --    BILIRUBIN mg/dL  --   --   --   --   --  0.5  --    ALK PHOS U/L  --   --   --   --   --  102  --    AST (SGOT) U/L  --   --   --   --   --  13  --    ALT (SGPT) U/L  --   --   --   --   --  7  --      Radiology(recent) No radiology results for the last day    Cardiac Studies:  No new studies; telemetry shows sinus rhythm    Medication Review:   Scheduled Meds:Acetylcysteine, 600 mg, Oral, BID  amLODIPine, 10 mg, Oral, Daily  aspirin, 81 mg, Oral, Daily  atorvastatin, 40 mg, Oral, Daily  clopidogrel, 75 mg, Oral, Daily  docusate sodium, 100 mg, Oral, Daily  escitalopram, 10 mg, Oral, Daily  heparin (porcine), 5,000 Units, Subcutaneous,  Q12H  hydrALAZINE, 50 mg, Oral, BID  insulin glargine, 10 Units, Subcutaneous, Daily With Dinner  insulin lispro, 0-14 Units, Subcutaneous, TID AC  insulin lispro, 5 Units, Subcutaneous, TID With Meals  levETIRAcetam, 500 mg, Oral, BID  levothyroxine, 25 mcg, Oral, Q AM  losartan, 50 mg, Oral, Q24H  metoprolol succinate XL, 50 mg, Oral, Q24H  povidone-iodine, , Topical, Daily  sodium chloride, 10 mL, Intravenous, Q12H  tamsulosin, 0.4 mg, Oral, Nightly      Continuous Infusions:sodium chloride, 1-3 mL/kg/hr  sodium chloride, 100 mL/hr, Last Rate: 100 mL/hr (06/09/21 0732)  sodium chloride, 75 mL/hr, Last Rate: Stopped (06/09/21 1830)      PRN Meds:.•  acetaminophen  •  atropine  •  dextrose  •  dextrose  •  glucagon (human recombinant)  •  hydrALAZINE  •  insulin lispro **AND** insulin lispro  •  ondansetron  •  sodium chloride  •  sodium chloride    Assessment:  levated troponin    -1.0 at outlying facility, 0.233 here  CAD with prior PCI to LCx, RCA 2011  Uncontrolled HTN:  Pt has stopped all antihypertensive agents for last month  DM  Dyslipidemia  Hx Seizure disorder with possible Seizure  Non compliance with medical therapy  Combined hyperlipidemia      Plan:  Patient presented after a seizure  Patient did not have any chest pain but had elevated troponin consistent with is a non-STEMI or versus renal insufficiency  Patient had previous stent placement to the circumflex artery and RCA  Patient blood pressure very high and his medications have been adjusted  Patient sugar levels and lipid levels are followed by the primary care doctor  Patient is noncompliant with medications also.  Patient had a stress test which was abnormal  Patient had a cardiac catheterization which showed severe three-vessel coronary artery disease  Patient's films are reviewed by surgeons.  Patient is felt not to be a candidate for surgery  Discussed with patient and I will discuss with his son also about performing stents to probably 1  or 2 arteries  Discussed with patient about the risks and benefits including the risk of heart attack stroke and death and arrhythmias during the procedure.    Patient had renal insufficiency and was cleared by the nephrologist for the procedure and renal function will be followed.  Eligio Robles MD  06/10/21  12:10 EDT      This report was generated using the Dragon voice recognition system.

## 2021-06-10 NOTE — PLAN OF CARE
Problem: Adult Inpatient Plan of Care  Goal: Plan of Care Review  Outcome: Ongoing, Progressing  Flowsheets  Taken 6/10/2021 0302 by Amy Beard, RN  Progress: no change  Taken 6/9/2021 0604 by Irish Whalen, RN  Plan of Care Reviewed With: patient   Goal Outcome Evaluation:           Progress: no change   Patient alert and oriented, states if he has to go to long term care wants to go to [facility] or [place] [facility], patient denies leg pain or numbness or tingling, cath site, dressing dry and intact, pulses noted, patient has rested well this shift.

## 2021-06-10 NOTE — PROGRESS NOTES
[facility] Medicine Services Daily Progress Note      Hospitalist Team  LOS 6 days      Patient Care Team:  Marizol Puentes MD as PCP - General (Family Medicine)    Patient Location: 2119/1      Subjective   Subjective     Chief Complaint / Subjective  F/u elevated bp and troponin     Brief Synopsis of Hospital Course/HPI  Isaias Molina is a 65 y.o. male who presented to [facility] from the ED at [facility] in [place]. He reports that he was having uncontrolled blood pressure at home.      Patient has a history of coronary artery disease with previous PCI to the left circumflex and RCA in 2011.     The patient reports in the last month he has stopped many of his medications including antihypertensive agents, his statin and Plavix.     He reports that he was having periods of lightheadedness and dizziness and thought his blood pressure was running low so he stopped these medications.     He reports with Covid he has not been out to the doctor and missed several follow-ups.     He denies any exertional chest pain or dyspnea but reports yesterday he was feeling unwell which prompted his visit to the emergency room.     Yesterday he was unaware of having a seizure. However this morning he reports he thinks he had a seizure yesterday because his muscles feel sore and tight and he bit his tongue.     He denies any current chest pain  In the emergency room at [facility] it was reported that his troponin was 1.0       6/5- Patient denies any further chest pain. Reports he thinks he might have had a seizure yesterday due to muscle cramping today.     6/6- Patient denies any complaint.    6/7- Patient reports no specific complaints at this time.  He had multiple questions related to his current meds and plan of treatment and all questions were answered.    6/8- Patient pt reports uncontrolled symptoms of depression (the bedside nurse also alerted me to this concern).  " The patient reports lexapro was working for the last 3 years, but effectiveness had declined prior to stopping it 2 months ago.  He is willing to try it again.    6/9- Patient reports no complaints. He has multiple concerns and was extensively counseled.      ROS12 point review of systems was reviewed and was negative except as above.      Objective   Objective      Vital Signs  Temp:  [97.6 °F (36.4 °C)-98.7 °F (37.1 °C)] 97.7 °F (36.5 °C)  Heart Rate:  [65-81] 65  Resp:  [16-18] 18  BP: (149-162)/(70-83) 155/75  Oxygen Therapy  SpO2: 95 %  Pulse Oximetry Type: Intermittent  Device (Oxygen Therapy): room air  Flowsheet Rows        First Filed Value   Admission Height  175.3 cm (69\") Documented at 06/04/2021 2120   Admission Weight  107 kg (234 lb 12.6 oz) Documented at 06/04/2021 2120          Intake & Output (last 3 days)       06/08 0701 - 06/09 0700 06/09 0701 - 06/10 0700 06/10 0701 - 06/11 0700    P.O. 840 600 960    Total Intake(mL/kg) 840 (7.9) 600 (5.6) 960 (9)    Urine (mL/kg/hr) 725 (0.3) 525 (0.2) 400 (0.3)    Total Output 725 525 400    Net +115 +75 +560           Urine Unmeasured Occurrence 125 x 3 x         Lines, Drains & Airways      Active LDAs       Name:   Placement date:   Placement time:   Site:   Days:    Peripheral IV 06/04/21  Posterior;Right Wrist   06/04/21    -- unknown    Wrist   1    Peripheral IV 06/05/21 0514 Left;Posterior Hand   06/05/21 0514    Hand   less than 1                      Physical Exam:    Physical Exam  Vital signs and nurses notes reviewed.  Well-developed well-nourished gentleman in no acute distress sitting up in bed awake and alert; mucous membranes moist; sclerae anicteric; lungs clear to auscultation bilaterally; CV regular rate and rhythm; abdomen soft nontender nondistended with active bowel sounds; extremities with no edema, cyanosis or calf tenderness; palpable pedal pulses bilaterally; right groin soft with no ecchymosis and clean dry dressing in place " not removed; no Flowers catheter.       Wounds (last 24 hours)      LDA Wound     Row Name 06/10/21 0800 06/10/21 0407 06/10/21 0000       Wound 06/08/21 0800 Left medial great toe Diabetic Ulcer    Wound - Properties Group Placement Date: 06/08/21 -BB Placement Time: 0800 -BB Side: Left  -BB Orientation: medial  -BB Location: great toe  -BB Primary Wound Type: Diabetic ulc  -BB    Dressing Appearance  open to air  -BB  open to air  -SM  open to air  -SM    Base  scab  -BB  scab  -SM  scab  -SM    Periwound  blanchable;redness  -BB  blanchable;redness  -SM  blanchable;redness  -SM    Periwound Temperature  --  warm  -SM  warm  -SM    Care, Wound  antimicrobial agent applied  -BB  --  --    Retired Wound - Properties Group Date first assessed: 06/08/21 -BB Time first assessed: 0800 -BB Side: Left  -BB Location: great toe  -BB Primary Wound Type: Diabetic ulc  -BB    Row Name 06/09/21 2053             Wound 06/08/21 0800 Left medial great toe Diabetic Ulcer    Wound - Properties Group Placement Date: 06/08/21  -BB Placement Time: 0800 -BB Side: Left  -BB Orientation: medial  -BB Location: great toe  -BB Primary Wound Type: Diabetic ulc  -BB    Dressing Appearance  open to air  -SM      Base  scab  -SM      Periwound  blanchable;redness  -SM      Periwound Temperature  warm  -SM      Retired Wound - Properties Group Date first assessed: 06/08/21 -BB Time first assessed: 0800 -BB Side: Left  -BB Location: great toe  -BB Primary Wound Type: Diabetic ulc  -BB      User Key  (r) = Recorded By, (t) = Taken By, (c) = Cosigned By    Initials Name Provider Type    BB Brunner, Brittany, RN Registered Nurse    Amy Monterroso, RN Registered Nurse          Procedures:              Results Review:     I reviewed the patient's new clinical results.      Lab Results (last 24 hours)     Procedure Component Value Units Date/Time    POC Glucose Once [[id number]]  (Abnormal) Collected: 06/10/21 1610    Specimen: Blood Updated:  06/10/21 1611     Glucose 180 mg/dL      Comment: Serial Number: [id number]:  [id number]       POC Glucose Once [[id number]]  (Abnormal) Collected: 06/10/21 1118    Specimen: Blood Updated: 06/10/21 1120     Glucose 184 mg/dL      Comment: Serial Number: [id number]:  [id number]       POC Glucose Once [[id number]]  (Abnormal) Collected: 06/10/21 0717    Specimen: Blood Updated: 06/10/21 0718     Glucose 148 mg/dL      Comment: Serial Number: [id number]:  [id number]       Renal Function Panel [[id number]]  (Abnormal) Collected: 06/10/21 0228    Specimen: Blood Updated: 06/10/21 0312     Glucose 174 mg/dL      BUN 35 mg/dL      Creatinine 2.22 mg/dL      Sodium 133 mmol/L      Potassium 5.0 mmol/L      Chloride 104 mmol/L      CO2 20.0 mmol/L      Calcium 8.4 mg/dL      Albumin 3.10 g/dL      Phosphorus 3.5 mg/dL      Anion Gap 9.0 mmol/L      BUN/Creatinine Ratio 15.8     eGFR Non African Amer 30 mL/min/1.73     Narrative:      GFR Normal >60  Chronic Kidney Disease <60  Kidney Failure <15      CBC (No Diff) [[id number]]  (Abnormal) Collected: 06/10/21 0228    Specimen: Blood Updated: 06/10/21 0307     WBC 12.20 10*3/mm3      RBC 4.46 10*6/mm3      Hemoglobin 12.4 g/dL      Hematocrit 35.4 %      MCV 79.3 fL      MCH 27.9 pg      MCHC 35.1 g/dL      RDW 14.8 %      RDW-SD 41.6 fl      MPV 9.3 fL      Platelets 241 10*3/mm3         No results found for: HGBA1C  Results from last 7 days   Lab Units 06/09/21  0640   INR  0.97           No results found for: LIPASE  Lab Results   Component Value Date    CHOL 166 06/05/2021    CHLPL 278 (H) 12/31/2019    TRIG 308 (H) 06/05/2021    HDL 22 (L) 06/05/2021    LDL 92 06/05/2021       No results found for: INTRAOP, PREDX, FINALDX, COMDX    Microbiology Results (last 10 days)     Procedure Component Value - Date/Time    COVID PRE-OP / PRE-PROCEDURE SCREENING ORDER (NO ISOLATION) - Swab, Nasopharynx [[id number]]  (Normal) Collected: 06/07/21 0647    Lab Status: Final  result Specimen: Swab from Nasopharynx Updated: 06/07/21 0731    Narrative:      The following orders were created for panel order COVID PRE-OP / PRE-PROCEDURE SCREENING ORDER (NO ISOLATION) - Swab, Nasopharynx.  Procedure                               Abnormality         Status                     ---------                               -----------         ------                     COVID-19,CEPHEID,COR/ERVIN...[[id number]]  Normal              Final result                 Please view results for these tests on the individual orders.    COVID-19,CEPHEID,COR/ERVIN/PAD/YANIV IN-HOUSE(OR EMERGENT/ADD-ON),NP SWAB IN TRANSPORT MEDIA 3-4 HR TAT, RT-PCR - Swab, Nasopharynx [[id number]]  (Normal) Collected: 06/07/21 0633    Lab Status: Final result Specimen: Swab from Nasopharynx Updated: 06/07/21 0731     COVID19 Not Detected    Narrative:      Fact sheet for providers: https://www.fda.gov/media/971955/download     Fact sheet for patients: https://www.fda.gov/media/973608/download  Fact sheet for providers: https://www.fda.gov/media/175009/download     Fact sheet for patients: https://www.fda.gov/media/784793/download          ECG/EMG Results (most recent)     Procedure Component Value Units Date/Time    SCANNED EKG [[id number]] Resulted: 06/04/21     Updated: 06/08/21 2201    Adult Transthoracic Echo Complete W/ Cont if Necessary Per Protocol [[id number]] Collected: 06/09/21 1505     Updated: 06/09/21 1733     BSA 2.2 m^2      RVIDd 2.5 cm      IVSd 1.3 cm      LVIDd 3.7 cm      LVIDs 2.6 cm      LVPWd 1.2 cm      IVS/LVPW 1.1     FS 30.8 %      EDV(Teich) 59.9 ml      ESV(Teich) 24.5 ml      EF(Teich) 59.2 %      EDV(cubed) 52.6 ml      ESV(cubed) 17.4 ml      EF(cubed) 66.8 %      LV mass(C)d 154.2 grams      LV mass(C)dI 69.6 grams/m^2      SV(Teich) 35.4 ml      SI(Teich) 16.0 ml/m^2      SV(cubed) 35.1 ml      SI(cubed) 15.9 ml/m^2      Ao root diam 3.2 cm      Ao root area 8.0 cm^2      ACS 2.3 cm      LVOT diam 2.3 cm       LVOT area 4.0 cm^2      EDV(MOD-sp4) 99.3 ml      ESV(MOD-sp4) 28.9 ml      EF(MOD-sp4) 70.9 %      SV(MOD-sp4) 70.3 ml      SI(MOD-sp4) 31.7 ml/m^2      Ao root area (BSA corrected) 1.4     LV Emery Vol (BSA corrected) 44.8 ml/m^2      LV Sys Vol (BSA corrected) 13.1 ml/m^2      MV E max tal 83.9 cm/sec      MV A max tal 114.7 cm/sec      MV E/A 0.73     MV V2 max 120.1 cm/sec      MV max PG 5.8 mmHg      MV V2 mean 72.7 cm/sec      MV mean PG 2.4 mmHg      MV V2 VTI 32.5 cm      MVA(VTI) 2.9 cm^2      MV dec slope 371.3 cm/sec^2      MV dec time 0.23 sec      Ao pk tal 138.7 cm/sec      Ao max PG 7.7 mmHg      Ao max PG (full) 2.0 mmHg      Ao V2 mean 90.6 cm/sec      Ao mean PG 3.8 mmHg      Ao mean PG (full) 0.73 mmHg      Ao V2 VTI 23.8 cm      PATTI(I,A) 3.9 cm^2      PATTI(I,D) 3.9 cm^2      PATTI(V,A) 3.4 cm^2      PATTI(V,D) 3.4 cm^2      LV V1 max PG 5.7 mmHg      LV V1 mean PG 3.1 mmHg      LV V1 max 119.2 cm/sec      LV V1 mean 80.5 cm/sec      LV V1 VTI 23.4 cm      SV(Ao) 190.8 ml      SI(Ao) 86.1 ml/m^2      SV(LVOT) 93.5 ml      SI(LVOT) 42.2 ml/m^2      PA V2 max 76.6 cm/sec      PA max PG 2.3 mmHg      PA max PG (full) 0.3 mmHg      PA V2 mean 55.8 cm/sec      PA mean PG 1.4 mmHg      PA mean PG (full) 0.38 mmHg      PA V2 VTI 15.2 cm      PA acc time 0.16 sec      RV V1 max PG 2.1 mmHg      RV V1 mean PG 1.1 mmHg      RV V1 max 71.6 cm/sec      RV V1 mean 47.4 cm/sec      RV V1 VTI 18.4 cm      TR max tal 131.0 cm/sec      RVSP(TR) 9.9 mmHg      RAP systole 3.0 mmHg      PA pr(Accel) 6.3 mmHg      Pulm Sys Tal 41.1 cm/sec      Pulm Emery Tal 27.0 cm/sec      Pulm S/D 1.5     Pulm A Revs Dur 0.12 sec      Pulm A Revs Tal 25.3 cm/sec      BH CV ECHO HANNAH - BZI_BMI 34.9 kilograms/m^2      BH CV ECHO HANNAH - BSA(HAYCOCK) 2.3 m^2      BH CV ECHO HANNAH - BZI_METRIC_WEIGHT 107.0 kg      BH CV ECHO HANNAH - BZI_METRIC_HEIGHT 175.3 cm      EF(MOD-bp) 70.0 %      LA dimension(2D) 4.1 cm           Results for orders  placed during the hospital encounter of 06/04/21    Duplex Vein Mapping Lower Extremity - Bilateral CAR    Interpretation Summary  · Right greater saphenous vein above knee: with adequate size. • Right greater saphenous vein below knee: with adequate size. • Left greater saphenous vein above knee: with inadequate size. • Left greater saphenous vein below knee: with inadequate size.      Results for orders placed in visit on 09/24/19    SCANNED - ECHOCARDIOGRAM      US Renal Bilateral    Result Date: 6/5/2021  1. No evidence of acute urinary obstruction. 2. Enlarged prostate. Electronically signed by:  Jesu Spicer M.D.  6/5/2021 6:34 PM          Xrays, labs reviewed personally by physician.    Medication Review:   I have reviewed the patient's current medication list      Scheduled Meds  Acetylcysteine, 600 mg, Oral, BID  amLODIPine, 10 mg, Oral, Daily  aspirin, 81 mg, Oral, Daily  atorvastatin, 40 mg, Oral, Daily  clopidogrel, 75 mg, Oral, Daily  docusate sodium, 100 mg, Oral, Daily  escitalopram, 10 mg, Oral, Daily  heparin (porcine), 5,000 Units, Subcutaneous, Q12H  hydrALAZINE, 50 mg, Oral, BID  insulin glargine, 10 Units, Subcutaneous, Daily With Dinner  insulin lispro, 0-14 Units, Subcutaneous, TID AC  insulin lispro, 5 Units, Subcutaneous, TID With Meals  levETIRAcetam, 500 mg, Oral, BID  levothyroxine, 25 mcg, Oral, Q AM  losartan, 50 mg, Oral, Q24H  metoprolol succinate XL, 50 mg, Oral, Q24H  povidone-iodine, , Topical, Daily  sodium chloride, 10 mL, Intravenous, Q12H  tamsulosin, 0.4 mg, Oral, Nightly        Meds Infusions  sodium chloride, 1-3 mL/kg/hr  sodium chloride, 100 mL/hr, Last Rate: 100 mL/hr (06/09/21 0732)  sodium chloride, 75 mL/hr, Last Rate: Stopped (06/09/21 1830)        Meds PRN  •  acetaminophen  •  atropine  •  dextrose  •  dextrose  •  glucagon (human recombinant)  •  hydrALAZINE  •  insulin lispro **AND** insulin lispro  •  ondansetron  •  sodium chloride  •  sodium  chloride        Assessment/Plan   Assessment/Plan     Active Hospital Problems    Diagnosis  POA   • **Acute kidney injury superimposed on chronic kidney disease (CMS/HCC) [N17.9, N18.9]  Yes   • Hypertensive emergency [I16.1]  Yes   • Elevated troponin level [R77.8]  Yes   • Abnormal nuclear stress test [R94.39]  Unknown      Resolved Hospital Problems   No resolved problems to display.       MEDICAL DECISION MAKING COMPLEXITY BY PROBLEM:     Coronary artery disease status post PCI and stents  -Cath performed 6/9/2021 showed multivessel coronary artery disease  -CV surgery consulted and patient is not felt to be a candidate for surgery  -Dr. Robles cardiology planning to perform stents to probably 1 or 2 arteries  - Continue aspirin and Plavix    Hypertensive emergency, resolved  Essential HTN, chronic and controlled  - patient reports noncompliance with home meds due to reported hypotension   - continue amlodipine, metoprolol and hydralazine  -Benazepril and HCTZ held due to DYLON  -Nephrology consulted and added losartan 6/7     Acute Kidney Injury due to prerenal azotemia from medication (ACE inhibitor and HCTZ)  CKD stage 3b due to hypertension  - baseline Cr 1.3-1.6  - Cr trending down with IV fluids   - Avoid nephrotoxic medications  - nephrology consulting    Leukocytosis, likely reactive secondary to above  -Monitor     Uncontrolled IDDM with hyperglycemia and retinopathy  - Hold Metformin  - Hgb a1c 6.4  - continue basal and bolus insulin  - SSI    Hyperlipidemia  - continue statin  - HDL 22, LDL 92, Triglycerides 308      Hypothyroidism  - pt states he stopped taking thyroid hormone  - TSH 3.74, free T4 0.98     Hx of CVA with with residual vision loss  -CT head negative for evidence of acute intracranial injury;  encephalomalacia in the right occipital and temporal  lobes consistent with old infarct  -Continue aspirin, Plavix and atorvastatin     Seizure disorder   - continue Keppra  - pt reports he may  have had a seizure prior to admission, no seizure activity since admission      Depression, chronic and uncontrolled  -Patient reportedly stopped taking home medications 2 months ago   (pt reports lexapro was working for the last 3 years, but effectiveness had declined prior to stopping it 2 months ago)  -Resume Lexapro monitor    BPH with bladder outlet obstruction  -Patient had discontinued flomax due to lightheadedness  - resume Flomax    DVT Prophylaxis  - heparin         VTE Prophylaxis -   Mechanical Order History:       None          Pharmalogical Order History:        Ordered     Dose Route Frequency Stop    06/05/21 0027  heparin (porcine) 5000 UNIT/ML injection 5,000 Units  Status:  Discontinued      5,000 Units SC Every 8 Hours Scheduled 06/05/21 0944    06/05/21 0944  heparin (porcine) 5000 UNIT/ML injection 5,000 Units      5,000 Units SC Every 12 Hours Scheduled --    06/04/21 2311  heparin (porcine) 5000 UNIT/ML injection 5,000 Units  Status:  Discontinued      5,000 Units SC Every 8 Hours Scheduled 06/05/21 0027                      Code Status -   Code Status and Medical Interventions:   Ordered at: 06/04/21 2311     Code Status:    CPR     Medical Interventions (Level of Support Prior to Arrest):    Full             Discharge Planning  Pending progress         Electronically signed by Caty Marroquin MD, 06/10/21, 19:52 EDT.  [facility] Hospitalist Team

## 2021-06-10 NOTE — CASE MANAGEMENT/SOCIAL WORK
Continued Stay Note  SHARIF Cardona     Patient Name: Isaias Molina  MRN: [id number]  Today's Date: 6/10/2021    Admit Date: 6/4/2021    Discharge Plan     Row Name 06/10/21 1247       Plan    Plan  Accepted to [facility] at [place], no precert needed. PASRR per MSW.    Patient/Family in Agreement with Plan  yes    Plan Comments  CM met with patient at bedside to discuss dc planning. Patient reported he was agreeable to going to rehab at [place]. First choice: [facility], next choice: [facility]. New referral sent to basket and liaison covering (Nidia). She confirmed they are able to accept at [place] and CM updated on dc barriers. Pharmacy for [place] updated to [facility]. Discussed PASRR with MSW. DC Barriers: Cardiology planning to stent placement. Anticipate dc once cleared by cardiology.        Met with patient in room wearing PPE: mask/goggles.      Maintained distance greater than six feet and spent less than 15 minutes in the room.      Nayeli Sahu RN     Office Phone: [phone]  Office Cell: [phone]

## 2021-06-10 NOTE — PLAN OF CARE
Goal Outcome Evaluation:              Outcome Summary: Per CT surgery pt not candidate for surgery. Cards plans on 1-2 stents, possibly tomorrow. DC plan is to go to [facility] [place].

## 2021-06-10 NOTE — PROGRESS NOTES
"   LOS: 6 days    Patient Care Team:  Marizol Puentes MD as PCP - General (Family Medicine)      Subjective     Patient seen and examined this morning.  Resting comfortably denies any chest pain, shortness of breath, nausea or vomiting  Cardiac cath showed multivessel disease    Objective     Vital Sign Min/Max for last 24 hours  Temp:  [97.6 °F (36.4 °C)-98.7 °F (37.1 °C)] 97.6 °F (36.4 °C)  Heart Rate:  [68-81] 70  Resp:  [16-18] 16  BP: (117-158)/(56-80) 149/80                       Flowsheet Rows      First Filed Value   Admission Height  175.3 cm (69\") Documented at 06/04/2021 2120   Admission Weight  107 kg (234 lb 12.6 oz) Documented at 06/04/2021 2120          No intake/output data recorded.  I/O last 3 completed shifts:  In: 600 [P.O.:600]  Out: 1250 [Urine:1250]    Physical Exam:  Physical Exam    General.  Awake, alert  Head.  Atraumatic, normocephalic  Neck.  Supple.  No JVD  Respiratory.  Clear to auscultation bilaterally.  No wheezing or rhonchi  Cardiovascular.  Normal rate.  Regular rhythm.  S1, S2  Abdomen.  Soft, obese, nontender.  No organomegaly.  Bowel sounds present  Extremities.  No edema.  Pulses palpable  Neurological.  No focal deficit       LABS:  Lab Results   Component Value Date    CALCIUM 8.4 (L) 06/10/2021    PHOS 3.5 06/10/2021     Results from last 7 days   Lab Units 06/10/21  0228 06/09/21  0640 06/09/21  0246 06/08/21  1501 06/07/21  0234 06/06/21  0511   MAGNESIUM mg/dL  --   --  1.7  --   --   --    SODIUM mmol/L 133*  --  136 136  --  138   POTASSIUM mmol/L 5.0  --  4.7 4.5  --  4.0   CHLORIDE mmol/L 104  --  103 106  --  107   CO2 mmol/L 20.0*  --  22.0 22.0  --  20.0*   BUN mg/dL 35*  --  25* 22  --  24*   CREATININE mg/dL 2.22*  --  1.97* 1.76*  --  1.85*   GLUCOSE mg/dL 174*  --  92 143*  --  144*   CALCIUM mg/dL 8.4*  --  8.5* 8.2*  --  7.8*   WBC 10*3/mm3 12.20* 14.40*  --  10.10   < > 10.00   HEMOGLOBIN g/dL 12.4* 13.0  --  12.5*   < > 12.0*   PLATELETS 10*3/mm3 " 241 230  --  222   < > 179   ALT (SGPT) U/L  --   --   --   --   --  7   AST (SGOT) U/L  --   --   --   --   --  13    < > = values in this interval not displayed.     Lab Results   Component Value Date    CKTOTAL 253 08/23/2018    TROPONINT 0.366 (C) 06/05/2021     Estimated Creatinine Clearance: 40 mL/min (A) (by C-G formula based on SCr of 2.22 mg/dL (H)).      Brief Urine Lab Results  (Last result in the past 365 days)      Color   Clarity   Blood   Leuk Est   Nitrite   Protein   CREAT   Urine HCG        06/05/21 1359             227.3       06/05/21 1359 Yellow Cloudy  Comment:  Result checked  Moderate (2+) Negative Negative >=300 mg/dL (3+)             WEIGHTS:     Wt Readings from Last 1 Encounters:   06/10/21 0453 107 kg (236 lb 8.9 oz)   06/09/21 1502 107 kg (236 lb)   06/09/21 0516 107 kg (236 lb 1.8 oz)   06/08/21 0559 107 kg (235 lb 7.2 oz)   06/07/21 0500 110 kg (242 lb 8.1 oz)   06/06/21 0553 107 kg (235 lb 10.8 oz)   06/04/21 2120 107 kg (234 lb 12.6 oz)       Acetylcysteine, 600 mg, Oral, BID  amLODIPine, 10 mg, Oral, Daily  aspirin, 81 mg, Oral, Daily  atorvastatin, 40 mg, Oral, Daily  clopidogrel, 75 mg, Oral, Daily  docusate sodium, 100 mg, Oral, Daily  escitalopram, 10 mg, Oral, Daily  heparin (porcine), 5,000 Units, Subcutaneous, Q12H  hydrALAZINE, 50 mg, Oral, BID  insulin glargine, 10 Units, Subcutaneous, Daily With Dinner  insulin lispro, 0-14 Units, Subcutaneous, TID AC  insulin lispro, 5 Units, Subcutaneous, TID With Meals  levETIRAcetam, 500 mg, Oral, BID  levothyroxine, 25 mcg, Oral, Q AM  losartan, 50 mg, Oral, Q24H  metoprolol succinate XL, 50 mg, Oral, Q24H  povidone-iodine, , Topical, Daily  sodium chloride, 10 mL, Intravenous, Q12H  tamsulosin, 0.4 mg, Oral, Nightly      sodium chloride, 1-3 mL/kg/hr  sodium chloride, 100 mL/hr, Last Rate: 100 mL/hr (06/09/21 0732)  sodium chloride, 75 mL/hr, Last Rate: Stopped (06/09/21 1830)        Assessment/Plan       1.Acute kidney injury on  CKD 3  Creatinine increasing again but patient have underlying chronic kidney disease with significant proteinuria, secondary to diabetic glomerulosclerosis  Status post cardiac cath  Continue supportive care and monitor for renal recovery  Electrolytes, volume status okay      2.Hypertension  Blood pressure Acceptable  Continue current antihypertensives    3.  Proteinuria  Secondary to diabetic glomerulosclerosis  Continue ARB    4.Coronary artery disease  Status post cardiac cath showed multivessel disease  Being evaluated by CT surgery      Sherman Will MD  06/10/21  10:11 EDT

## 2021-06-10 NOTE — CASE MANAGEMENT/SOCIAL WORK
Continued Stay Note  SHARIF Cardona     Patient Name: Isaias Molina  MRN: [id number]  Today's Date: 6/10/2021    Admit Date: 6/4/2021    Discharge Plan     Row Name 06/10/21 1709       Plan    Plan  Accepted to [facility] at [place], no precert needed. PASRR queued for review. Pharmacy up to date for [facility] ([facility])        Phone communication or documentation only - no physical contact with patient or family.    Carly Mccloud [facility]OL[facility], LSW    Office: [phone]  Cell: [phone]  Fax: [phone]  E-mail: [email or web address]

## 2021-06-11 LAB
ACT BLD: 158 SECONDS (ref 89–137)
ACT BLD: 241 SECONDS (ref 89–137)
ALBUMIN SERPL-MCNC: 2.9 G/DL (ref 3.5–5.2)
ALBUMIN/GLOB SERPL: 1 G/DL
ALP SERPL-CCNC: 100 U/L (ref 39–117)
ALT SERPL W P-5'-P-CCNC: 21 U/L (ref 1–41)
ANION GAP SERPL CALCULATED.3IONS-SCNC: 10 MMOL/L (ref 5–15)
AST SERPL-CCNC: 17 U/L (ref 1–40)
BASOPHILS # BLD AUTO: 0.2 10*3/MM3 (ref 0–0.2)
BASOPHILS NFR BLD AUTO: 1.6 % (ref 0–1.5)
BILIRUB SERPL-MCNC: 0.3 MG/DL (ref 0–1.2)
BUN SERPL-MCNC: 34 MG/DL (ref 8–23)
BUN/CREAT SERPL: 18.7 (ref 7–25)
CALCIUM SPEC-SCNC: 8.1 MG/DL (ref 8.6–10.5)
CHLORIDE SERPL-SCNC: 106 MMOL/L (ref 98–107)
CO2 SERPL-SCNC: 21 MMOL/L (ref 22–29)
CREAT SERPL-MCNC: 1.82 MG/DL (ref 0.76–1.27)
DEPRECATED RDW RBC AUTO: 41.6 FL (ref 37–54)
EOSINOPHIL # BLD AUTO: 0.2 10*3/MM3 (ref 0–0.4)
EOSINOPHIL NFR BLD AUTO: 2.1 % (ref 0.3–6.2)
ERYTHROCYTE [DISTWIDTH] IN BLOOD BY AUTOMATED COUNT: 14.7 % (ref 12.3–15.4)
GFR SERPL CREATININE-BSD FRML MDRD: 38 ML/MIN/1.73
GLOBULIN UR ELPH-MCNC: 2.8 GM/DL
GLUCOSE BLDC GLUCOMTR-MCNC: 124 MG/DL (ref 70–105)
GLUCOSE BLDC GLUCOMTR-MCNC: 136 MG/DL (ref 70–105)
GLUCOSE BLDC GLUCOMTR-MCNC: 220 MG/DL (ref 70–105)
GLUCOSE BLDC GLUCOMTR-MCNC: 221 MG/DL (ref 70–105)
GLUCOSE SERPL-MCNC: 123 MG/DL (ref 65–99)
HCT VFR BLD AUTO: 35.9 % (ref 37.5–51)
HGB BLD-MCNC: 12.5 G/DL (ref 13–17.7)
LYMPHOCYTES # BLD AUTO: 2.7 10*3/MM3 (ref 0.7–3.1)
LYMPHOCYTES NFR BLD AUTO: 26.5 % (ref 19.6–45.3)
MCH RBC QN AUTO: 27.8 PG (ref 26.6–33)
MCHC RBC AUTO-ENTMCNC: 34.6 G/DL (ref 31.5–35.7)
MCV RBC AUTO: 80.3 FL (ref 79–97)
MONOCYTES # BLD AUTO: 0.7 10*3/MM3 (ref 0.1–0.9)
MONOCYTES NFR BLD AUTO: 7 % (ref 5–12)
NEUTROPHILS NFR BLD AUTO: 6.5 10*3/MM3 (ref 1.7–7)
NEUTROPHILS NFR BLD AUTO: 62.8 % (ref 42.7–76)
NRBC BLD AUTO-RTO: 0.1 /100 WBC (ref 0–0.2)
PLATELET # BLD AUTO: 214 10*3/MM3 (ref 140–450)
PMV BLD AUTO: 9.2 FL (ref 6–12)
POTASSIUM SERPL-SCNC: 4.5 MMOL/L (ref 3.5–5.2)
PROT SERPL-MCNC: 5.7 G/DL (ref 6–8.5)
RBC # BLD AUTO: 4.47 10*6/MM3 (ref 4.14–5.8)
SODIUM SERPL-SCNC: 137 MMOL/L (ref 136–145)
WBC # BLD AUTO: 10.4 10*3/MM3 (ref 3.4–10.8)

## 2021-06-11 PROCEDURE — C1894 INTRO/SHEATH, NON-LASER: HCPCS | Performed by: INTERNAL MEDICINE

## 2021-06-11 PROCEDURE — 25010000002 HEPARIN (PORCINE) PER 1000 UNITS: Performed by: INTERNAL MEDICINE

## 2021-06-11 PROCEDURE — 92921 PR PRQ TRLUML CORONARY ANGIOPLASTY ADDL BRANCH: CPT | Performed by: INTERNAL MEDICINE

## 2021-06-11 PROCEDURE — 63710000001 INSULIN GLARGINE PER 5 UNITS: Performed by: INTERNAL MEDICINE

## 2021-06-11 PROCEDURE — C1769 GUIDE WIRE: HCPCS | Performed by: INTERNAL MEDICINE

## 2021-06-11 PROCEDURE — 82962 GLUCOSE BLOOD TEST: CPT

## 2021-06-11 PROCEDURE — 25010000002 METHYLPREDNISOLONE PER 125 MG: Performed by: INTERNAL MEDICINE

## 2021-06-11 PROCEDURE — C9600 PERC DRUG-EL COR STENT SING: HCPCS | Performed by: INTERNAL MEDICINE

## 2021-06-11 PROCEDURE — 92921: CPT | Performed by: INTERNAL MEDICINE

## 2021-06-11 PROCEDURE — 0 IOPAMIDOL PER 1 ML: Performed by: INTERNAL MEDICINE

## 2021-06-11 PROCEDURE — 027034Z DILATION OF CORONARY ARTERY, ONE ARTERY WITH DRUG-ELUTING INTRALUMINAL DEVICE, PERCUTANEOUS APPROACH: ICD-10-PCS | Performed by: INTERNAL MEDICINE

## 2021-06-11 PROCEDURE — 85347 COAGULATION TIME ACTIVATED: CPT

## 2021-06-11 PROCEDURE — C1887 CATHETER, GUIDING: HCPCS | Performed by: INTERNAL MEDICINE

## 2021-06-11 PROCEDURE — 99152 MOD SED SAME PHYS/QHP 5/>YRS: CPT | Performed by: INTERNAL MEDICINE

## 2021-06-11 PROCEDURE — 99233 SBSQ HOSP IP/OBS HIGH 50: CPT | Performed by: INTERNAL MEDICINE

## 2021-06-11 PROCEDURE — 80053 COMPREHEN METABOLIC PANEL: CPT | Performed by: INTERNAL MEDICINE

## 2021-06-11 PROCEDURE — 99232 SBSQ HOSP IP/OBS MODERATE 35: CPT | Performed by: HOSPITALIST

## 2021-06-11 PROCEDURE — 92928 PRQ TCAT PLMT NTRAC ST 1 LES: CPT | Performed by: INTERNAL MEDICINE

## 2021-06-11 PROCEDURE — 25010000002 DIPHENHYDRAMINE PER 50 MG: Performed by: INTERNAL MEDICINE

## 2021-06-11 PROCEDURE — 25010000002 FENTANYL CITRATE (PF) 100 MCG/2ML SOLUTION: Performed by: INTERNAL MEDICINE

## 2021-06-11 PROCEDURE — 63710000001 INSULIN LISPRO (HUMAN) PER 5 UNITS: Performed by: INTERNAL MEDICINE

## 2021-06-11 PROCEDURE — 25010000002 MIDAZOLAM PER 1 MG: Performed by: INTERNAL MEDICINE

## 2021-06-11 PROCEDURE — C1725 CATH, TRANSLUMIN NON-LASER: HCPCS | Performed by: INTERNAL MEDICINE

## 2021-06-11 PROCEDURE — C1874 STENT, COATED/COV W/DEL SYS: HCPCS | Performed by: INTERNAL MEDICINE

## 2021-06-11 PROCEDURE — 85025 COMPLETE CBC W/AUTO DIFF WBC: CPT | Performed by: INTERNAL MEDICINE

## 2021-06-11 DEVICE — XIENCE SIERRA™ EVEROLIMUS ELUTING CORONARY STENT SYSTEM 3.00 MM X 28 MM / RAPID-EXCHANGE
Type: IMPLANTABLE DEVICE | Status: FUNCTIONAL
Brand: XIENCE SIERRA™

## 2021-06-11 RX ORDER — MIDAZOLAM HYDROCHLORIDE 1 MG/ML
INJECTION INTRAMUSCULAR; INTRAVENOUS AS NEEDED
Status: DISCONTINUED | OUTPATIENT
Start: 2021-06-11 | End: 2021-06-11 | Stop reason: HOSPADM

## 2021-06-11 RX ORDER — FENTANYL CITRATE 50 UG/ML
INJECTION, SOLUTION INTRAMUSCULAR; INTRAVENOUS AS NEEDED
Status: DISCONTINUED | OUTPATIENT
Start: 2021-06-11 | End: 2021-06-11 | Stop reason: HOSPADM

## 2021-06-11 RX ORDER — LIDOCAINE HYDROCHLORIDE 20 MG/ML
INJECTION, SOLUTION INFILTRATION; PERINEURAL AS NEEDED
Status: DISCONTINUED | OUTPATIENT
Start: 2021-06-11 | End: 2021-06-11 | Stop reason: HOSPADM

## 2021-06-11 RX ORDER — METHYLPREDNISOLONE SODIUM SUCCINATE 125 MG/2ML
125 INJECTION, POWDER, LYOPHILIZED, FOR SOLUTION INTRAMUSCULAR; INTRAVENOUS ONCE
Status: COMPLETED | OUTPATIENT
Start: 2021-06-11 | End: 2021-06-11

## 2021-06-11 RX ORDER — MIDAZOLAM HYDROCHLORIDE 1 MG/ML
1 INJECTION INTRAMUSCULAR; INTRAVENOUS ONCE
Status: CANCELLED | OUTPATIENT
Start: 2021-06-11 | End: 2021-06-11

## 2021-06-11 RX ORDER — HEPARIN SODIUM 1000 [USP'U]/ML
INJECTION, SOLUTION INTRAVENOUS; SUBCUTANEOUS AS NEEDED
Status: DISCONTINUED | OUTPATIENT
Start: 2021-06-11 | End: 2021-06-11 | Stop reason: HOSPADM

## 2021-06-11 RX ORDER — SODIUM CHLORIDE 9 MG/ML
75 INJECTION, SOLUTION INTRAVENOUS CONTINUOUS
Status: DISCONTINUED | OUTPATIENT
Start: 2021-06-11 | End: 2021-06-12

## 2021-06-11 RX ORDER — SODIUM CHLORIDE 9 MG/ML
1-3 INJECTION, SOLUTION INTRAVENOUS CONTINUOUS
Status: CANCELLED | OUTPATIENT
Start: 2021-06-11

## 2021-06-11 RX ORDER — DIPHENHYDRAMINE HYDROCHLORIDE 50 MG/ML
50 INJECTION INTRAMUSCULAR; INTRAVENOUS ONCE
Status: COMPLETED | OUTPATIENT
Start: 2021-06-11 | End: 2021-06-11

## 2021-06-11 RX ORDER — ACETAMINOPHEN 325 MG/1
650 TABLET ORAL EVERY 4 HOURS PRN
Status: DISCONTINUED | OUTPATIENT
Start: 2021-06-11 | End: 2021-06-13 | Stop reason: HOSPADM

## 2021-06-11 RX ORDER — SODIUM CHLORIDE 9 MG/ML
INJECTION, SOLUTION INTRAVENOUS CONTINUOUS PRN
Status: COMPLETED | OUTPATIENT
Start: 2021-06-11 | End: 2021-06-11

## 2021-06-11 RX ORDER — FENTANYL CITRATE 50 UG/ML
25 INJECTION, SOLUTION INTRAMUSCULAR; INTRAVENOUS ONCE
Status: CANCELLED | OUTPATIENT
Start: 2021-06-11 | End: 2021-06-11

## 2021-06-11 RX ORDER — SODIUM CHLORIDE 9 MG/ML
250 INJECTION, SOLUTION INTRAVENOUS ONCE AS NEEDED
Status: DISCONTINUED | OUTPATIENT
Start: 2021-06-11 | End: 2021-06-13 | Stop reason: HOSPADM

## 2021-06-11 RX ADMIN — AMLODIPINE BESYLATE 10 MG: 5 TABLET ORAL at 09:07

## 2021-06-11 RX ADMIN — HYDRALAZINE HYDROCHLORIDE 50 MG: 25 TABLET, FILM COATED ORAL at 22:53

## 2021-06-11 RX ADMIN — ASPIRIN 81 MG: 81 TABLET, COATED ORAL at 05:56

## 2021-06-11 RX ADMIN — METHYLPREDNISOLONE SODIUM SUCCINATE 125 MG: 125 INJECTION, POWDER, FOR SOLUTION INTRAMUSCULAR; INTRAVENOUS at 16:49

## 2021-06-11 RX ADMIN — INSULIN GLARGINE 10 UNITS: 100 INJECTION, SOLUTION SUBCUTANEOUS at 23:07

## 2021-06-11 RX ADMIN — Medication 600 MG: at 08:59

## 2021-06-11 RX ADMIN — ESCITALOPRAM OXALATE 10 MG: 10 TABLET ORAL at 08:59

## 2021-06-11 RX ADMIN — HEPARIN SODIUM 5000 UNITS: 5000 INJECTION INTRAVENOUS; SUBCUTANEOUS at 22:55

## 2021-06-11 RX ADMIN — METHYLPREDNISOLONE SODIUM SUCCINATE 125 MG: 125 INJECTION, POWDER, FOR SOLUTION INTRAMUSCULAR; INTRAVENOUS at 12:22

## 2021-06-11 RX ADMIN — DIPHENHYDRAMINE HYDROCHLORIDE 50 MG: 50 INJECTION, SOLUTION INTRAMUSCULAR; INTRAVENOUS at 12:22

## 2021-06-11 RX ADMIN — HYDRALAZINE HYDROCHLORIDE 50 MG: 25 TABLET, FILM COATED ORAL at 09:07

## 2021-06-11 RX ADMIN — ATORVASTATIN CALCIUM 40 MG: 40 TABLET, FILM COATED ORAL at 09:00

## 2021-06-11 RX ADMIN — DOCUSATE SODIUM 100 MG: 100 CAPSULE, LIQUID FILLED ORAL at 08:59

## 2021-06-11 RX ADMIN — CLOPIDOGREL BISULFATE 75 MG: 75 TABLET ORAL at 05:57

## 2021-06-11 RX ADMIN — INSULIN LISPRO 5 UNITS: 100 INJECTION, SOLUTION INTRAVENOUS; SUBCUTANEOUS at 12:22

## 2021-06-11 RX ADMIN — Medication 600 MG: at 22:52

## 2021-06-11 RX ADMIN — TAMSULOSIN HYDROCHLORIDE 0.4 MG: 0.4 CAPSULE ORAL at 22:54

## 2021-06-11 RX ADMIN — LEVOTHYROXINE SODIUM 25 MCG: 0.03 TABLET ORAL at 05:56

## 2021-06-11 RX ADMIN — DIPHENHYDRAMINE HYDROCHLORIDE 50 MG: 50 INJECTION, SOLUTION INTRAMUSCULAR; INTRAVENOUS at 16:49

## 2021-06-11 RX ADMIN — Medication 10 ML: at 22:56

## 2021-06-11 RX ADMIN — METOPROLOL SUCCINATE 50 MG: 50 TABLET, EXTENDED RELEASE ORAL at 09:07

## 2021-06-11 RX ADMIN — LEVETIRACETAM 500 MG: 500 TABLET ORAL at 08:59

## 2021-06-11 RX ADMIN — LEVETIRACETAM 500 MG: 500 TABLET ORAL at 22:53

## 2021-06-11 RX ADMIN — Medication 10 ML: at 08:59

## 2021-06-11 RX ADMIN — POVIDONE-IODINE 1 APPLICATION: 10 SOLUTION TOPICAL at 09:01

## 2021-06-11 RX ADMIN — LOSARTAN POTASSIUM 50 MG: 50 TABLET, FILM COATED ORAL at 09:07

## 2021-06-11 NOTE — PLAN OF CARE
Problem: Adult Inpatient Plan of Care  Goal: Plan of Care Review  Outcome: Ongoing, Progressing  Flowsheets  Taken 6/11/2021 0237 by Amy Beard, RN  Progress: no change  Taken 6/9/2021 0604 by Irish Whalen, RN  Plan of Care Reviewed With: patient   Goal Outcome Evaluation:           Progress: no change   Patient alert and oriented, NPO, poss heart cath 6-11-21 with stents, patient has rested well this shift.

## 2021-06-11 NOTE — CASE MANAGEMENT/SOCIAL WORK
Continued Stay Note  SHARIF Cardona     Patient Name: Isaias Molina  MRN: [id number]  Today's Date: 6/11/2021    Admit Date: 6/4/2021    Discharge Plan     Row Name 06/11/21 1431       Plan    Plan  Accepted to Celena Rizzo at [place], no precert needed, PASRR approved. Pharmacy up to date for [place] ([facility]).    Plan Comments  CA Barriers: Cr 1.82, nephrology following, IV fluids, scheduled to have cardiac catheterization with stents placed later today.        Phone communication or documentation only - no physical contact with patient or family.    Nayeli Sahu RN     Office Phone: [phone]  Office Cell: [phone]

## 2021-06-11 NOTE — PROGRESS NOTES
[facility] Medicine Services Daily Progress Note      Hospitalist Team  LOS 7 days      Patient Care Team:  Marizol Puentes MD as PCP - General (Family Medicine)    Patient Location: Pool/Cath      Subjective   Subjective     Chief Complaint / Subjective  F/u elevated bp and troponin     Brief Synopsis of Hospital Course/HPI  Isaias Molina is a 65 y.o. male who presented to [facility] from the ED at [facility] in [place]. He reports that he was having uncontrolled blood pressure at home.      Patient has a history of coronary artery disease with previous PCI to the left circumflex and RCA in 2011.     The patient reports in the last month he has stopped many of his medications including antihypertensive agents, his statin and Plavix.     He reports that he was having periods of lightheadedness and dizziness and thought his blood pressure was running low so he stopped these medications.     He reports with Covid he has not been out to the doctor and missed several follow-ups.     He denies any exertional chest pain or dyspnea but reports yesterday he was feeling unwell which prompted his visit to the emergency room.     Yesterday he was unaware of having a seizure. However this morning he reports he thinks he had a seizure yesterday because his muscles feel sore and tight and he bit his tongue.     He denies any current chest pain  In the emergency room at [facility] it was reported that his troponin was 1.0       6/5- Patient denies any further chest pain. Reports he thinks he might have had a seizure yesterday due to muscle cramping today.     6/6- Patient denies any complaint.    6/7- Patient reports no specific complaints at this time.  He had multiple questions related to his current meds and plan of treatment and all questions were answered.    6/8- Patient pt reports uncontrolled symptoms of depression (the bedside nurse also alerted me to this  "concern).  The patient reports lexapro was working for the last 3 years, but effectiveness had declined prior to stopping it 2 months ago.  He is willing to try it again.    6/9- Patient reports no complaints. He has multiple concerns and was extensively counseled.    6/10-patient was seen prior to heart cath and he denies current complaints.  He was counseled and all questions were answered.  Heart cath showed multivessel coronary artery disease and cardiovascular surgery was consulted for possible bypass.    6/11-patient was advised by the surgeon that he is not a surgical candidate.  He is awaiting cardiac cath with stents.  He denies current complaints.  He was counseled and all questions were answered.    ROS12 point review of systems was reviewed and was negative except as above.      Objective   Objective      Vital Signs  Temp:  [98 °F (36.7 °C)-98.6 °F (37 °C)] 98 °F (36.7 °C)  Heart Rate:  [62-74] 73  Resp:  [16-20] 20  BP: (135-176)/(68-96) 176/96  Oxygen Therapy  SpO2: 95 %  Pulse Oximetry Type: Intermittent  Device (Oxygen Therapy): room air  Flowsheet Rows        First Filed Value   Admission Height  175.3 cm (69\") Documented at 06/04/2021 2120   Admission Weight  107 kg (234 lb 12.6 oz) Documented at 06/04/2021 2120          Intake & Output (last 3 days)       06/09 0701 - 06/10 0700 06/10 0701 - 06/11 0700 06/11 0701 - 06/12 0700    P.O. 600 1200     Total Intake(mL/kg) 600 (5.6) 1200 (11.2)     Urine (mL/kg/hr) 525 (0.2) 1000 (0.4) 1300 (1)    Total Output 525 1000 1300    Net +75 +200 -1300           Urine Unmeasured Occurrence 3 x 2 x         Lines, Drains & Airways      Active LDAs       Name:   Placement date:   Placement time:   Site:   Days:    Peripheral IV 06/04/21  Posterior;Right Wrist   06/04/21    -- unknown    Wrist   1    Peripheral IV 06/05/21 0514 Left;Posterior Hand   06/05/21 0514    Hand   less than 1                      Physical Exam:    Physical Exam  Vital signs and nurses " notes reviewed.  Exam unchanged from 6/10/2021. well-developed well-nourished gentleman in no acute distress sitting up in bed awake and alert; mucous membranes moist; sclerae anicteric; lungs clear to auscultation bilaterally; CV regular rate and rhythm; abdomen soft nontender nondistended with active bowel sounds; extremities with no edema, cyanosis or calf tenderness; palpable pedal pulses bilaterally; right groin soft with no ecchymosis and clean dry dressing in place not removed; no Flowers catheter.       Wounds (last 24 hours)      LDA Wound     Row Name 06/11/21 1628 06/11/21 1242 06/11/21 0845       Wound 06/08/21 0800 Left medial great toe Diabetic Ulcer    Wound - Properties Group Placement Date: 06/08/21 -BB Placement Time: 0800 -BB Side: Left  -BB Orientation: medial  -BB Location: great toe  -BB Primary Wound Type: Diabetic ulc  -BB    Base  scab  -CW  scab  -CW  scab  -CW    Periwound  blanchable;redness  -CW  blanchable;redness  -CW  blanchable;redness  -CW    Periwound Temperature  warm  -CW  warm  -CW  warm  -CW    Retired Wound - Properties Group Date first assessed: 06/08/21 -BB Time first assessed: 0800 -BB Side: Left  -BB Location: great toe  -BB Primary Wound Type: Diabetic ulc  -BB    Row Name 06/11/21 0435 06/11/21 0005 06/10/21 2004       Wound 06/08/21 0800 Left medial great toe Diabetic Ulcer    Wound - Properties Group Placement Date: 06/08/21 -BB Placement Time: 0800 -BB Side: Left  -BB Orientation: medial  -BB Location: great toe  -BB Primary Wound Type: Diabetic ulc  -BB    Dressing Appearance  open to air  -SM  open to air  -SM  open to air  -SM    Base  scab  -SM  scab  -SM  scab  -SM    Periwound  blanchable;redness  -SM  blanchable;redness  -SM  blanchable;redness  -SM    Periwound Temperature  warm  -SM  warm  -SM  warm  -SM    Retired Wound - Properties Group Date first assessed: 06/08/21 -BB Time first assessed: 0800 -BB Side: Left  -BB Location: great toe  -BB Primary  Wound Type: Diabetic [place]  -BB      User Key  (r) = Recorded By, (t) = Taken By, (c) = Cosigned By    Initials Name Provider Type    BB Brunner, Brittany, RN Registered Nurse    Kathia Tafoya RN Registered Nurse    Amy Monterroso RN Registered Nurse          Procedures:              Results Review:     I reviewed the patient's new clinical results.      Lab Results (last 24 hours)     Procedure Component Value Units Date/Time    POC Glucose Once [[id number]]  (Abnormal) Collected: 06/11/21 1613    Specimen: Blood Updated: 06/11/21 1614     Glucose 220 mg/dL      Comment: Serial Number: [id number]:  [id number]       POC Glucose Once [[id number]]  (Abnormal) Collected: 06/11/21 1109    Specimen: Blood Updated: 06/11/21 1110     Glucose 136 mg/dL      Comment: Serial Number: [id number]:  [id number]       Comprehensive Metabolic Panel [[id number]]  (Abnormal) Collected: 06/11/21 0917    Specimen: Blood Updated: 06/11/21 0947     Glucose 123 mg/dL      BUN 34 mg/dL      Creatinine 1.82 mg/dL      Sodium 137 mmol/L      Potassium 4.5 mmol/L      Chloride 106 mmol/L      CO2 21.0 mmol/L      Calcium 8.1 mg/dL      Total Protein 5.7 g/dL      Albumin 2.90 g/dL      ALT (SGPT) 21 U/L      AST (SGOT) 17 U/L      Alkaline Phosphatase 100 U/L      Total Bilirubin 0.3 mg/dL      eGFR Non African Amer 38 mL/min/1.73      Globulin 2.8 gm/dL      A/G Ratio 1.0 g/dL      BUN/Creatinine Ratio 18.7     Anion Gap 10.0 mmol/L     Narrative:      GFR Normal >60  Chronic Kidney Disease <60  Kidney Failure <15      CBC & Differential [[id number]]  (Abnormal) Collected: 06/11/21 0755    Specimen: Blood Updated: 06/11/21 0806    Narrative:      The following orders were created for panel order CBC & Differential.  Procedure                               Abnormality         Status                     ---------                               -----------         ------                     CBC Auto Differential[[id number]]         Abnormal            Final result                 Please view results for these tests on the individual orders.    CBC Auto Differential [[id number]]  (Abnormal) Collected: 06/11/21 0755    Specimen: Blood Updated: 06/11/21 0806     WBC 10.40 10*3/mm3      RBC 4.47 10*6/mm3      Hemoglobin 12.5 g/dL      Hematocrit 35.9 %      MCV 80.3 fL      MCH 27.8 pg      MCHC 34.6 g/dL      RDW 14.7 %      RDW-SD 41.6 fl      MPV 9.2 fL      Platelets 214 10*3/mm3      Neutrophil % 62.8 %      Lymphocyte % 26.5 %      Monocyte % 7.0 %      Eosinophil % 2.1 %      Basophil % 1.6 %      Neutrophils, Absolute 6.50 10*3/mm3      Lymphocytes, Absolute 2.70 10*3/mm3      Monocytes, Absolute 0.70 10*3/mm3      Eosinophils, Absolute 0.20 10*3/mm3      Basophils, Absolute 0.20 10*3/mm3      nRBC 0.1 /100 WBC     POC Glucose Once [[id number]]  (Abnormal) Collected: 06/11/21 0717    Specimen: Blood Updated: 06/11/21 0718     Glucose 124 mg/dL      Comment: Serial Number: [id number]:  [id number]           No results found for: HGBA1C  Results from last 7 days   Lab Units 06/09/21  0640   INR  0.97           No results found for: LIPASE  Lab Results   Component Value Date    CHOL 166 06/05/2021    CHLPL 278 (H) 12/31/2019    TRIG 308 (H) 06/05/2021    HDL 22 (L) 06/05/2021    LDL 92 06/05/2021       No results found for: INTRAOP, PREDX, FINALDX, COMDX    Microbiology Results (last 10 days)     Procedure Component Value - Date/Time    COVID PRE-OP / PRE-PROCEDURE SCREENING ORDER (NO ISOLATION) - Swab, Nasopharynx [[id number]]  (Normal) Collected: 06/07/21 0633    Lab Status: Final result Specimen: Swab from Nasopharynx Updated: 06/07/21 0731    Narrative:      The following orders were created for panel order COVID PRE-OP / PRE-PROCEDURE SCREENING ORDER (NO ISOLATION) - Swab, Nasopharynx.  Procedure                               Abnormality         Status                     ---------                               -----------          ------                     COVID-19,CEPHEID,COR/ERVIN...[[id number]]  Normal              Final result                 Please view results for these tests on the individual orders.    COVID-19,CEPHEID,COR/ERVIN/PAD/YANIV IN-HOUSE(OR EMERGENT/ADD-ON),NP SWAB IN TRANSPORT MEDIA 3-4 HR TAT, RT-PCR - Swab, Nasopharynx [[id number]]  (Normal) Collected: 06/07/21 0633    Lab Status: Final result Specimen: Swab from Nasopharynx Updated: 06/07/21 0731     COVID19 Not Detected    Narrative:      Fact sheet for providers: https://www.fda.gov/media/230945/download     Fact sheet for patients: https://www.fda.gov/media/492463/download  Fact sheet for providers: https://www.fda.gov/media/127615/download     Fact sheet for patients: https://www.fda.gov/media/844379/download          ECG/EMG Results (most recent)     Procedure Component Value Units Date/Time    SCANNED EKG [[id number]] Resulted: 06/04/21     Updated: 06/08/21 2201    Adult Transthoracic Echo Complete W/ Cont if Necessary Per Protocol [[id number]] Collected: 06/09/21 1505     Updated: 06/10/21 2120     BSA 2.2 m^2      RVIDd 2.5 cm      IVSd 1.3 cm      LVIDd 3.7 cm      LVIDs 2.6 cm      LVPWd 1.2 cm      IVS/LVPW 1.1     FS 30.8 %      EDV(Teich) 59.9 ml      ESV(Teich) 24.5 ml      EF(Teich) 59.2 %      EDV(cubed) 52.6 ml      ESV(cubed) 17.4 ml      EF(cubed) 66.8 %      LV mass(C)d 154.2 grams      LV mass(C)dI 69.6 grams/m^2      SV(Teich) 35.4 ml      SI(Teich) 16.0 ml/m^2      SV(cubed) 35.1 ml      SI(cubed) 15.9 ml/m^2      Ao root diam 3.2 cm      Ao root area 8.0 cm^2      ACS 2.3 cm      LVOT diam 2.3 cm      LVOT area 4.0 cm^2      EDV(MOD-sp4) 99.3 ml      ESV(MOD-sp4) 28.9 ml      EF(MOD-sp4) 70.9 %      SV(MOD-sp4) 70.3 ml      SI(MOD-sp4) 31.7 ml/m^2      Ao root area (BSA corrected) 1.4     LV Emery Vol (BSA corrected) 44.8 ml/m^2      LV Sys Vol (BSA corrected) 13.1 ml/m^2      MV E max radha 83.9 cm/sec      MV A max radha 114.7 cm/sec      MV E/A 0.73     MV  V2 max 120.1 cm/sec      MV max PG 5.8 mmHg      MV V2 mean 72.7 cm/sec      MV mean PG 2.4 mmHg      MV V2 VTI 32.5 cm      MVA(VTI) 2.9 cm^2      MV dec slope 371.3 cm/sec^2      MV dec time 0.23 sec      Ao pk tal 138.7 cm/sec      Ao max PG 7.7 mmHg      Ao max PG (full) 2.0 mmHg      Ao V2 mean 90.6 cm/sec      Ao mean PG 3.8 mmHg      Ao mean PG (full) 0.73 mmHg      Ao V2 VTI 23.8 cm      PATTI(I,A) 3.9 cm^2      PATTI(I,D) 3.9 cm^2      PATTI(V,A) 3.4 cm^2      PATTI(V,D) 3.4 cm^2      LV V1 max PG 5.7 mmHg      LV V1 mean PG 3.1 mmHg      LV V1 max 119.2 cm/sec      LV V1 mean 80.5 cm/sec      LV V1 VTI 23.4 cm      SV(Ao) 190.8 ml      SI(Ao) 86.1 ml/m^2      SV(LVOT) 93.5 ml      SI(LVOT) 42.2 ml/m^2      PA V2 max 76.6 cm/sec      PA max PG 2.3 mmHg      PA max PG (full) 0.3 mmHg      PA V2 mean 55.8 cm/sec      PA mean PG 1.4 mmHg      PA mean PG (full) 0.38 mmHg      PA V2 VTI 15.2 cm      PA acc time 0.16 sec      RV V1 max PG 2.1 mmHg      RV V1 mean PG 1.1 mmHg      RV V1 max 71.6 cm/sec      RV V1 mean 47.4 cm/sec      RV V1 VTI 18.4 cm      TR max tal 131.0 cm/sec      RVSP(TR) 9.9 mmHg      RAP systole 3.0 mmHg      PA pr(Accel) 6.3 mmHg      Pulm Sys Tal 41.1 cm/sec      Pulm Emery Tal 27.0 cm/sec      Pulm S/D 1.5     Pulm A Revs Dur 0.12 sec      Pulm A Revs Tal 25.3 cm/sec       CV ECHO HANNAH - BZI_BMI 34.9 kilograms/m^2       CV ECHO HANNAH - BSA(HAYCOCK) 2.3 m^2       CV ECHO HANNAH - BZI_METRIC_WEIGHT 107.0 kg       CV ECHO HANNAH - BZI_METRIC_HEIGHT 175.3 cm      LA dimension(2D) 4.1 cm     Narrative:      · Left ventricular ejection fraction appears to be 61 - 65%.  · No pericardial effusion noted             Results for orders placed during the hospital encounter of 06/04/21    Duplex Vein Mapping Lower Extremity - Bilateral CAR    Interpretation Summary  · Right greater saphenous vein above knee: with adequate size. • Right greater saphenous vein below knee: with adequate size. • Left greater  saphenous vein above knee: with inadequate size. • Left greater saphenous vein below knee: with inadequate size.      Results for orders placed during the hospital encounter of 06/04/21    Adult Transthoracic Echo Complete W/ Cont if Necessary Per Protocol    Interpretation Summary  · Left ventricular ejection fraction appears to be 61 - 65%.  · No pericardial effusion noted      US Renal Bilateral    Result Date: 6/5/2021  1. No evidence of acute urinary obstruction. 2. Enlarged prostate. Electronically signed by:  Jesu Spicer M.D.  6/5/2021 6:34 PM          Xrays, labs reviewed personally by physician.    Medication Review:   I have reviewed the patient's current medication list      Scheduled Meds  [MAR Hold] Acetylcysteine, 600 mg, Oral, BID  amLODIPine, 10 mg, Oral, Daily  [MAR Hold] aspirin, 81 mg, Oral, Daily  [MAR Hold] atorvastatin, 40 mg, Oral, Daily  [MAR Hold] clopidogrel, 75 mg, Oral, Daily  [MAR Hold] docusate sodium, 100 mg, Oral, Daily  [MAR Hold] escitalopram, 10 mg, Oral, Daily  [MAR Hold] heparin (porcine), 5,000 Units, Subcutaneous, Q12H  hydrALAZINE, 50 mg, Oral, BID  [MAR Hold] insulin glargine, 10 Units, Subcutaneous, Daily With Dinner  [MAR Hold] insulin lispro, 0-14 Units, Subcutaneous, TID AC  [MAR Hold] insulin lispro, 5 Units, Subcutaneous, TID With Meals  levETIRAcetam, 500 mg, Oral, BID  [MAR Hold] levothyroxine, 25 mcg, Oral, Q AM  losartan, 50 mg, Oral, Q24H  metoprolol succinate XL, 50 mg, Oral, Q24H  [MAR Hold] povidone-iodine, , Topical, Daily  [MAR Hold] sodium chloride, 10 mL, Intravenous, Q12H  [MAR Hold] tamsulosin, 0.4 mg, Oral, Nightly        Meds Infusions  sodium chloride, 1-3 mL/kg/hr  sodium chloride, 100 mL/hr, Last Rate: 100 mL/hr (06/09/21 0732)  sodium chloride, 75 mL/hr, Last Rate: Stopped (06/09/21 1830)  sodium chloride, , Last Rate: 75 mL/hr (06/11/21 1901)        Meds PRN  •  [MAR Hold] acetaminophen  •  [MAR Hold] atropine  •  [MAR Hold] dextrose  •  [MAR  Hold] dextrose  •  fentaNYL citrate (PF)  •  [MAR Hold] glucagon (human recombinant)  •  hydrALAZINE  •  [MAR Hold] insulin lispro **AND** [MAR Hold] insulin lispro  •  midazolam  •  [MAR Hold] ondansetron  •  [MAR Hold] sodium chloride  •  [MAR Hold] sodium chloride  •  sodium chloride        Assessment/Plan   Assessment/Plan     Active Hospital Problems    Diagnosis  POA   • **Acute kidney injury superimposed on chronic kidney disease (CMS/HCC) [N17.9, N18.9]  Yes   • Hypertensive emergency [I16.1]  Yes   • Elevated troponin level [R77.8]  Yes   • Abnormal nuclear stress test [R94.39]  Unknown      Resolved Hospital Problems   No resolved problems to display.       MEDICAL DECISION MAKING COMPLEXITY BY PROBLEM:     Coronary artery disease status post PCI and stents  -Cath performed 6/9/2021 showed multivessel coronary artery disease  -CV surgery consulted and patient is not felt to be a candidate for surgery  -Dr. Robles cardiology planning to perform stents to probably 1 or 2 arteries  - Continue aspirin and Plavix    Hypertensive emergency, resolved  Essential HTN, chronic and controlled  - patient reports noncompliance with home meds due to reported hypotension   - continue amlodipine, metoprolol and hydralazine  -Benazepril and HCTZ held due to DYLON  -Nephrology consulted and added losartan 6/7     Acute Kidney Injury due to prerenal azotemia from medication (ACE inhibitor and HCTZ)  CKD stage 3b due to hypertension  - baseline Cr 1.3-1.6  - Cr trending down with IV fluids   - Avoid nephrotoxic medications  - nephrology consulting    Leukocytosis, likely reactive secondary to above  -Monitor     Uncontrolled IDDM with hyperglycemia and retinopathy  - Hold Metformin  - Hgb a1c 6.4  - continue basal and bolus insulin  - SSI    Hyperlipidemia  - continue statin  - HDL 22, LDL 92, Triglycerides 308      Hypothyroidism  - pt states he stopped taking thyroid hormone  - TSH 3.74, free T4 0.98     Hx of CVA with with  residual vision loss  -CT head negative for evidence of acute intracranial injury;  encephalomalacia in the right occipital and temporal  lobes consistent with old infarct  -Continue aspirin, Plavix and atorvastatin     Seizure disorder   - continue Keppra  - pt reports he may have had a seizure prior to admission, no seizure activity since admission      Depression, chronic and uncontrolled  -Patient reportedly stopped taking home medications 2 months ago   (pt reports lexapro was working for the last 3 years, but effectiveness had declined prior to stopping it 2 months ago)  -Resume Lexapro monitor    BPH with bladder outlet obstruction  -Patient had discontinued flomax due to lightheadedness  - resume Flomax    DVT Prophylaxis  - heparin         VTE Prophylaxis -   Mechanical Order History:       None          Pharmalogical Order History:        Ordered     Dose Route Frequency Stop    06/05/21 0027  heparin (porcine) 5000 UNIT/ML injection 5,000 Units  Status:  Discontinued      5,000 Units SC Every 8 Hours Scheduled 06/05/21 0944    06/05/21 0944  heparin (porcine) 5000 UNIT/ML injection 5,000 Units      5,000 Units SC Every 12 Hours Scheduled --    06/04/21 2311  heparin (porcine) 5000 UNIT/ML injection 5,000 Units  Status:  Discontinued      5,000 Units SC Every 8 Hours Scheduled 06/05/21 0027                      Code Status -   Code Status and Medical Interventions:   Ordered at: 06/04/21 2311     Code Status:    CPR     Medical Interventions (Level of Support Prior to Arrest):    Full             Discharge Planning  Pending progress         Electronically signed by Caty Marroquin MD, 06/11/21, 19:11 EDT.  [facility] Hospitalist Team

## 2021-06-11 NOTE — PLAN OF CARE
Goal Outcome Evaluation:      Pt having heart cath today. [facility]. On room air, no distress noted. Will continue to monitor  Problem: Adult Inpatient Plan of Care  Goal: Absence of Hospital-Acquired Illness or Injury  Intervention: Identify and Manage Fall Risk  Recent Flowsheet Documentation  Taken 6/11/2021 1450 by Kathia Laura RN  Safety Promotion/Fall Prevention: safety round/check completed  Taken 6/11/2021 1242 by Kathia Laura RN  Safety Promotion/Fall Prevention: safety round/check completed  Taken 6/11/2021 1024 by Kathia Laura RN  Safety Promotion/Fall Prevention: safety round/check completed  Taken 6/11/2021 0845 by Kathia Laura RN  Safety Promotion/Fall Prevention: safety round/check completed     Problem: Fall Injury Risk  Goal: Absence of Fall and Fall-Related Injury  Intervention: Promote Injury-Free Environment  Recent Flowsheet Documentation  Taken 6/11/2021 1450 by Kathia Laura RN  Safety Promotion/Fall Prevention: safety round/check completed  Taken 6/11/2021 1242 by Kathia Laura RN  Safety Promotion/Fall Prevention: safety round/check completed  Taken 6/11/2021 1024 by Kathia Laura RN  Safety Promotion/Fall Prevention: safety round/check completed  Taken 6/11/2021 0845 by Kathia Laura RN  Safety Promotion/Fall Prevention: safety round/check completed

## 2021-06-11 NOTE — PROGRESS NOTES
"   LOS: 7 days    Patient Care Team:  Marizol Puentes MD as PCP - General (Family Medicine)      Subjective     Patient seen and examined this morning.  Resting comfortably denies any chest pain, shortness of breath, nausea or vomiting  Cardiac cath showed multivessel disease    Objective     Vital Sign Min/Max for last 24 hours  Temp:  [97.7 °F (36.5 °C)-98.6 °F (37 °C)] 98 °F (36.7 °C)  Heart Rate:  [62-74] 62  Resp:  [16-20] 16  BP: (148-163)/(68-87) 148/71                       Flowsheet Rows      First Filed Value   Admission Height  175.3 cm (69\") Documented at 06/04/2021 2120   Admission Weight  107 kg (234 lb 12.6 oz) Documented at 06/04/2021 2120          I/O this shift:  In: -   Out: 400 [Urine:400]  I/O last 3 completed shifts:  In: 1320 [P.O.:1320]  Out: 1525 [Urine:1525]    Physical Exam:  Physical Exam    General.  Awake, alert  Head.  Atraumatic, normocephalic  Neck.  Supple.  No JVD  Respiratory.  Clear to auscultation bilaterally.  No wheezing or rhonchi  Cardiovascular.  Normal rate.  Regular rhythm.  S1, S2  Abdomen.  Soft, obese, nontender.  No organomegaly.  Bowel sounds present  Extremities.  No edema.  Pulses palpable  Neurological.  No focal deficit       LABS:  Lab Results   Component Value Date    CALCIUM 8.1 (L) 06/11/2021    PHOS 3.5 06/10/2021     Results from last 7 days   Lab Units 06/11/21  0917 06/11/21  0755 06/10/21  0228 06/09/21  0640 06/09/21  0246 06/08/21  1501 06/07/21  0234 06/06/21  0511   MAGNESIUM mg/dL  --   --   --   --  1.7  --   --   --    SODIUM mmol/L 137  --  133*  --  136  --   --  138   POTASSIUM mmol/L 4.5  --  5.0  --  4.7  --   --  4.0   CHLORIDE mmol/L 106  --  104  --  103  --   --  107   CO2 mmol/L 21.0*  --  20.0*  --  22.0  --   --  20.0*   BUN mg/dL 34*  --  35*  --  25*  --   --  24*   CREATININE mg/dL 1.82*  --  2.22*  --  1.97*  --   --  1.85*   GLUCOSE mg/dL 123*  --  174*  --  92   < >  --  144*   CALCIUM mg/dL 8.1*  --  8.4*  --  8.5*  --   " --  7.8*   WBC 10*3/mm3  --  10.40 12.20* 14.40*  --   --    < > 10.00   HEMOGLOBIN g/dL  --  12.5* 12.4* 13.0  --   --    < > 12.0*   PLATELETS 10*3/mm3  --  214 241 230  --   --    < > 179   ALT (SGPT) U/L 21  --   --   --   --   --   --  7   AST (SGOT) U/L 17  --   --   --   --   --   --  13    < > = values in this interval not displayed.     Lab Results   Component Value Date    CKTOTAL 253 08/23/2018    TROPONINT 0.366 (C) 06/05/2021     Estimated Creatinine Clearance: 48.8 mL/min (A) (by C-G formula based on SCr of 1.82 mg/dL (H)).      Brief Urine Lab Results  (Last result in the past 365 days)      Color   Clarity   Blood   Leuk Est   Nitrite   Protein   CREAT   Urine HCG        06/05/21 1359             227.3       06/05/21 1359 Yellow Cloudy  Comment:  Result checked  Moderate (2+) Negative Negative >=300 mg/dL (3+)             WEIGHTS:     Wt Readings from Last 1 Encounters:   06/10/21 0453 107 kg (236 lb 8.9 oz)   06/09/21 1502 107 kg (236 lb)   06/09/21 0516 107 kg (236 lb 1.8 oz)   06/08/21 0559 107 kg (235 lb 7.2 oz)   06/07/21 0500 110 kg (242 lb 8.1 oz)   06/06/21 0553 107 kg (235 lb 10.8 oz)   06/04/21 2120 107 kg (234 lb 12.6 oz)       Acetylcysteine, 600 mg, Oral, BID  amLODIPine, 10 mg, Oral, Daily  aspirin, 81 mg, Oral, Daily  atorvastatin, 40 mg, Oral, Daily  clopidogrel, 75 mg, Oral, Daily  diphenhydrAMINE, 50 mg, Intravenous, Once  docusate sodium, 100 mg, Oral, Daily  escitalopram, 10 mg, Oral, Daily  heparin (porcine), 5,000 Units, Subcutaneous, Q12H  hydrALAZINE, 50 mg, Oral, BID  insulin glargine, 10 Units, Subcutaneous, Daily With Dinner  insulin lispro, 0-14 Units, Subcutaneous, TID AC  insulin lispro, 5 Units, Subcutaneous, TID With Meals  levETIRAcetam, 500 mg, Oral, BID  levothyroxine, 25 mcg, Oral, Q AM  losartan, 50 mg, Oral, Q24H  methylPREDNISolone sodium succinate, 125 mg, Intravenous, Once  metoprolol succinate XL, 50 mg, Oral, Q24H  povidone-iodine, , Topical, Daily  sodium  chloride, 10 mL, Intravenous, Q12H  tamsulosin, 0.4 mg, Oral, Nightly      sodium chloride, 1-3 mL/kg/hr  sodium chloride, 100 mL/hr, Last Rate: 100 mL/hr (06/09/21 0732)  sodium chloride, 75 mL/hr, Last Rate: Stopped (06/09/21 1830)        Assessment/Plan       1.Acute kidney injury on CKD 3  Patient's creatinine little better at 1.8 mg/dL.  Patient has underlying chronic kidney disease with proteinuria due to diabetic glomerulosclerosis  Patient is going for repeat catheter PCI today.  I explained the risk of contrast-induced nephropathy to him in detail and he sound understanding  Started him back on IV hydration    2.Hypertension  Blood pressure Acceptable  Continue current antihypertensives    3.  Proteinuria  Secondary to diabetic glomerulosclerosis  Continue ARB    4.Coronary artery disease  Status post cardiac cath showed multivessel disease  Cardiology planning for PCI today      Sherman Will MD  06/11/21  13:29 EDT

## 2021-06-11 NOTE — PROGRESS NOTES
CARDIOLOGY FOLLOW-UP PROGRESS NOTE      Reason for follow-up:    Elevated troponin    -1.0 at outlying facility, 0.233 here  CAD with prior PCI to LCx, RCA 2011  Uncontrolled HTN:  Pt has stopped all antihypertensive agents for last month  DM  Dyslipidemia  Hx Seizure disorder with possible Seizure  Non compliance with medical therapy     Attending: Caty Marroquin MD      SUBJECTIVE:    No chest pain or shortness of breath.  Lying in bed comfortably     Review of Systems   General: denies fever, chills, anorexia, weight loss  Eyes: denies blurring, diplopia  Ear/Nose/Throat: denies ear pain, nosebleeds, hoarseness  Cardiovascular: See HPI  Respiratory: denies excessive sputum, hemoptysis, wheezing  Gastrointestinal: denies nausea, vomiting, change in bowel habits, abdominal pain  Genitourinary: denies dysuria and hematuria  Musculoskeletal: denies back pain, joint pain, joint swelling, muscle cramps, weakness  Skin: denies rashes, itching, suspicious lesions  Neurologic: denies focal neuro deficits  Psychiatric: denies depression, anxiety  Endocrine: denies cold intolerance, heat intolerance  Hematologic/Lymphatic: denies abnormal bruising, bleeding  Allergic/Immunologic: denies urticaria or persistent infections      Allergies: Shellfish allergy    Scheduled Meds:Acetylcysteine, 600 mg, Oral, BID  amLODIPine, 10 mg, Oral, Daily  aspirin, 81 mg, Oral, Daily  atorvastatin, 40 mg, Oral, Daily  clopidogrel, 75 mg, Oral, Daily  diphenhydrAMINE, 50 mg, Intravenous, Once  docusate sodium, 100 mg, Oral, Daily  escitalopram, 10 mg, Oral, Daily  heparin (porcine), 5,000 Units, Subcutaneous, Q12H  hydrALAZINE, 50 mg, Oral, BID  insulin glargine, 10 Units, Subcutaneous, Daily With Dinner  insulin lispro, 0-14 Units, Subcutaneous, TID AC  insulin lispro, 5 Units, Subcutaneous, TID With Meals  levETIRAcetam, 500 mg, Oral, BID  levothyroxine, 25 mcg, Oral, Q AM  losartan, 50 mg, Oral, Q24H  methylPREDNISolone sodium succinate,  125 mg, Intravenous, Once  metoprolol succinate XL, 50 mg, Oral, Q24H  povidone-iodine, , Topical, Daily  sodium chloride, 10 mL, Intravenous, Q12H  tamsulosin, 0.4 mg, Oral, Nightly        Continuous Infusions:sodium chloride, 1-3 mL/kg/hr  sodium chloride, 100 mL/hr, Last Rate: 100 mL/hr (06/09/21 0732)  sodium chloride, 75 mL/hr, Last Rate: Stopped (06/09/21 1830)        PRN Meds:.•  acetaminophen  •  atropine  •  dextrose  •  dextrose  •  glucagon (human recombinant)  •  hydrALAZINE  •  insulin lispro **AND** insulin lispro  •  ondansetron  •  sodium chloride  •  sodium chloride    Objective     Vital Signs  Vitals:    06/10/21 2002 06/10/21 2240 06/11/21 0347 06/11/21 1028   BP: 158/68 159/77 163/87 148/71   BP Location: Right arm      Patient Position: Lying      Pulse: 74 67 68 62   Resp: 20 18 16 16   Temp:  98.6 °F (37 °C) 98 °F (36.7 °C) 98 °F (36.7 °C)   TempSrc:  Oral Oral Oral   SpO2:  98% 96%    Weight:       Height:         Wt Readings from Last 1 Encounters:   06/10/21 107 kg (236 lb 8.9 oz)       Physical Exam:     General Appearance:    Alert, oriented, no acute distress   Neck:  Supple without JVD or bruit   Lungs:    Grossly clear with adequate airflow bilaterally    Heart:    Regular rate and rhythm, normal S1 and S2, no            murmur, no gallop, no rub, no click   Chest Wall/Thorax:    No abnormalities observed   Abdomen:     Normal bowel sounds, no masses, no organomegaly, soft        non-tender, non-distended, no guarding, no rebound                tenderness   Extremities:  Range of motion adequate, no edema, no cyanosis, no             redness   Pulses:   Pulses palpable and equal bilaterally   Skin:   No bleeding, bruising or rash   Neurologic:  No focal deficits               Results Review:     CBC    Results from last 7 days   Lab Units 06/11/21  0755 06/10/21  0228 06/09/21  0640 06/08/21  1501 06/08/21  0250 06/06/21  0511 06/05/21  0000   WBC 10*3/mm3 10.40 12.20* 14.40* 10.10  11.20* 10.00 11.00*   HEMOGLOBIN g/dL 12.5* 12.4* 13.0 12.5* 12.9* 12.0* 11.6*   PLATELETS 10*3/mm3 214 241 230 222 190 179 202     BMP   Results from last 7 days   Lab Units 06/11/21  0917 06/10/21  0228 06/09/21  0246 06/08/21  1501 06/08/21  0250 06/07/21  0234 06/06/21  0511 06/05/21  0001   SODIUM mmol/L 137 133* 136 136 136 136 138 136   POTASSIUM mmol/L 4.5 5.0 4.7 4.5 4.2 4.1 4.0 3.8   CHLORIDE mmol/L 106 104 103 106 106 107 107 104   CO2 mmol/L 21.0* 20.0* 22.0 22.0 18.0* 19.0* 20.0* 21.0*   BUN mg/dL 34* 35* 25* 22 22 24* 24* 27*   CREATININE mg/dL 1.82* 2.22* 1.97* 1.76* 1.66* 1.94* 1.85* 2.33*   GLUCOSE mg/dL 123* 174* 92 143* 98 137* 144* 219*   MAGNESIUM mg/dL  --   --  1.7  --   --   --   --   --    PHOSPHORUS mg/dL  --  3.5 4.1  --  3.8  --   --  3.7     HbA1C   Lab Results   Component Value Date    HGBA1C 6.4 (H) 06/05/2021    HGBA1C 7.1 (H) 12/31/2019    HGBA1C 7.6 (H) 07/08/2019     CMP   Results from last 7 days   Lab Units 06/11/21  0917 06/10/21  0228 06/09/21  0246 06/08/21  1501 06/08/21  0250 06/07/21  0234 06/06/21  0511   SODIUM mmol/L 137 133* 136 136 136 136 138   POTASSIUM mmol/L 4.5 5.0 4.7 4.5 4.2 4.1 4.0   CHLORIDE mmol/L 106 104 103 106 106 107 107   CO2 mmol/L 21.0* 20.0* 22.0 22.0 18.0* 19.0* 20.0*   BUN mg/dL 34* 35* 25* 22 22 24* 24*   CREATININE mg/dL 1.82* 2.22* 1.97* 1.76* 1.66* 1.94* 1.85*   GLUCOSE mg/dL 123* 174* 92 143* 98 137* 144*   ALBUMIN g/dL 2.90* 3.10* 2.80*  --  2.70*  --  2.80*   BILIRUBIN mg/dL 0.3  --   --   --   --   --  0.5   ALK PHOS U/L 100  --   --   --   --   --  102   AST (SGOT) U/L 17  --   --   --   --   --  13   ALT (SGPT) U/L 21  --   --   --   --   --  7     Radiology(recent) No radiology results for the last day    Cardiac Studies:  No new studies; telemetry shows sinus rhythm    Medication Review:   Scheduled Meds:Acetylcysteine, 600 mg, Oral, BID  amLODIPine, 10 mg, Oral, Daily  aspirin, 81 mg, Oral, Daily  atorvastatin, 40 mg, Oral,  Daily  clopidogrel, 75 mg, Oral, Daily  diphenhydrAMINE, 50 mg, Intravenous, Once  docusate sodium, 100 mg, Oral, Daily  escitalopram, 10 mg, Oral, Daily  heparin (porcine), 5,000 Units, Subcutaneous, Q12H  hydrALAZINE, 50 mg, Oral, BID  insulin glargine, 10 Units, Subcutaneous, Daily With Dinner  insulin lispro, 0-14 Units, Subcutaneous, TID AC  insulin lispro, 5 Units, Subcutaneous, TID With Meals  levETIRAcetam, 500 mg, Oral, BID  levothyroxine, 25 mcg, Oral, Q AM  losartan, 50 mg, Oral, Q24H  methylPREDNISolone sodium succinate, 125 mg, Intravenous, Once  metoprolol succinate XL, 50 mg, Oral, Q24H  povidone-iodine, , Topical, Daily  sodium chloride, 10 mL, Intravenous, Q12H  tamsulosin, 0.4 mg, Oral, Nightly      Continuous Infusions:sodium chloride, 1-3 mL/kg/hr  sodium chloride, 100 mL/hr, Last Rate: 100 mL/hr (06/09/21 0732)  sodium chloride, 75 mL/hr, Last Rate: Stopped (06/09/21 1830)      PRN Meds:.•  acetaminophen  •  atropine  •  dextrose  •  dextrose  •  glucagon (human recombinant)  •  hydrALAZINE  •  insulin lispro **AND** insulin lispro  •  ondansetron  •  sodium chloride  •  sodium chloride    Assessment:  levated troponin    -1.0 at outlying facility, 0.233 here  CAD with prior PCI to LCx, RCA 2011  Uncontrolled HTN:  Pt has stopped all antihypertensive agents for last month  DM  Dyslipidemia  Hx Seizure disorder with possible Seizure  Non compliance with medical therapy  Combined hyperlipidemia      Plan:  Patient presented after a seizure  Patient did not have any chest pain but had elevated troponin consistent with is a non-STEMI or versus renal insufficiency  Patient had previous stent placement to the circumflex artery and RCA  Patient blood pressure very high and his medications have been adjusted  Patient sugar levels and lipid levels are followed by the primary care doctor  Patient is noncompliant with medications also.  Patient had a stress test which was abnormal  Patient had a cardiac  catheterization which showed severe three-vessel coronary artery disease  Patient's films are reviewed by surgeons.  Patient is felt not to be a candidate for surgery  Discussed with patient and I will discuss with his son also about performing stents to probably 1 or 2 arteries  Discussed with patient about the risks and benefits including the risk of heart attack stroke and death and arrhythmias during the procedure.    Patient had renal insufficiency and was cleared by the nephrologist for the procedure and renal function will be followed.  Eligio Robles MD  06/11/21  13:19 EDT      This report was generated using the Dragon voice recognition system.

## 2021-06-12 LAB
ALBUMIN SERPL-MCNC: 3.1 G/DL (ref 3.5–5.2)
ANION GAP SERPL CALCULATED.3IONS-SCNC: 10 MMOL/L (ref 5–15)
BASOPHILS # BLD AUTO: 0 10*3/MM3 (ref 0–0.2)
BASOPHILS NFR BLD AUTO: 0.3 % (ref 0–1.5)
BUN SERPL-MCNC: 36 MG/DL (ref 8–23)
BUN/CREAT SERPL: 19.8 (ref 7–25)
CALCIUM SPEC-SCNC: 8.3 MG/DL (ref 8.6–10.5)
CHLORIDE SERPL-SCNC: 106 MMOL/L (ref 98–107)
CO2 SERPL-SCNC: 20 MMOL/L (ref 22–29)
CREAT SERPL-MCNC: 1.82 MG/DL (ref 0.76–1.27)
DEPRECATED RDW RBC AUTO: 42.4 FL (ref 37–54)
EOSINOPHIL # BLD AUTO: 0 10*3/MM3 (ref 0–0.4)
EOSINOPHIL NFR BLD AUTO: 0.1 % (ref 0.3–6.2)
ERYTHROCYTE [DISTWIDTH] IN BLOOD BY AUTOMATED COUNT: 14.9 % (ref 12.3–15.4)
GFR SERPL CREATININE-BSD FRML MDRD: 38 ML/MIN/1.73
GLUCOSE BLDC GLUCOMTR-MCNC: 178 MG/DL (ref 70–105)
GLUCOSE BLDC GLUCOMTR-MCNC: 185 MG/DL (ref 70–105)
GLUCOSE BLDC GLUCOMTR-MCNC: 207 MG/DL (ref 70–105)
GLUCOSE BLDC GLUCOMTR-MCNC: 224 MG/DL (ref 70–105)
GLUCOSE SERPL-MCNC: 230 MG/DL (ref 65–99)
HCT VFR BLD AUTO: 37.8 % (ref 37.5–51)
HGB BLD-MCNC: 13 G/DL (ref 13–17.7)
LYMPHOCYTES # BLD AUTO: 0.7 10*3/MM3 (ref 0.7–3.1)
LYMPHOCYTES NFR BLD AUTO: 4.8 % (ref 19.6–45.3)
MCH RBC QN AUTO: 27.7 PG (ref 26.6–33)
MCHC RBC AUTO-ENTMCNC: 34.5 G/DL (ref 31.5–35.7)
MCV RBC AUTO: 80.4 FL (ref 79–97)
MONOCYTES # BLD AUTO: 0.1 10*3/MM3 (ref 0.1–0.9)
MONOCYTES NFR BLD AUTO: 0.9 % (ref 5–12)
NEUTROPHILS NFR BLD AUTO: 13.4 10*3/MM3 (ref 1.7–7)
NEUTROPHILS NFR BLD AUTO: 93.9 % (ref 42.7–76)
NRBC BLD AUTO-RTO: 0 /100 WBC (ref 0–0.2)
PHOSPHATE SERPL-MCNC: 4.2 MG/DL (ref 2.5–4.5)
PLATELET # BLD AUTO: 206 10*3/MM3 (ref 140–450)
PMV BLD AUTO: 9.9 FL (ref 6–12)
POTASSIUM SERPL-SCNC: 4.7 MMOL/L (ref 3.5–5.2)
RBC # BLD AUTO: 4.7 10*6/MM3 (ref 4.14–5.8)
SODIUM SERPL-SCNC: 136 MMOL/L (ref 136–145)
WBC # BLD AUTO: 14.3 10*3/MM3 (ref 3.4–10.8)

## 2021-06-12 PROCEDURE — 80069 RENAL FUNCTION PANEL: CPT | Performed by: INTERNAL MEDICINE

## 2021-06-12 PROCEDURE — 97116 GAIT TRAINING THERAPY: CPT

## 2021-06-12 PROCEDURE — 63710000001 INSULIN GLARGINE PER 5 UNITS: Performed by: INTERNAL MEDICINE

## 2021-06-12 PROCEDURE — 82962 GLUCOSE BLOOD TEST: CPT

## 2021-06-12 PROCEDURE — 99232 SBSQ HOSP IP/OBS MODERATE 35: CPT | Performed by: HOSPITALIST

## 2021-06-12 PROCEDURE — 85025 COMPLETE CBC W/AUTO DIFF WBC: CPT | Performed by: INTERNAL MEDICINE

## 2021-06-12 PROCEDURE — 25010000002 HEPARIN (PORCINE) PER 1000 UNITS: Performed by: INTERNAL MEDICINE

## 2021-06-12 PROCEDURE — 99232 SBSQ HOSP IP/OBS MODERATE 35: CPT | Performed by: INTERNAL MEDICINE

## 2021-06-12 PROCEDURE — 63710000001 INSULIN LISPRO (HUMAN) PER 5 UNITS: Performed by: INTERNAL MEDICINE

## 2021-06-12 RX ORDER — INSULIN GLARGINE 100 [IU]/ML
12 INJECTION, SOLUTION SUBCUTANEOUS
Status: DISCONTINUED | OUTPATIENT
Start: 2021-06-13 | End: 2021-06-13 | Stop reason: HOSPADM

## 2021-06-12 RX ORDER — INSULIN LISPRO 100 [IU]/ML
6 INJECTION, SOLUTION INTRAVENOUS; SUBCUTANEOUS
Status: DISCONTINUED | OUTPATIENT
Start: 2021-06-13 | End: 2021-06-13 | Stop reason: HOSPADM

## 2021-06-12 RX ADMIN — CLOPIDOGREL BISULFATE 75 MG: 75 TABLET ORAL at 08:17

## 2021-06-12 RX ADMIN — HYDRALAZINE HYDROCHLORIDE 50 MG: 25 TABLET, FILM COATED ORAL at 08:17

## 2021-06-12 RX ADMIN — Medication 600 MG: at 22:39

## 2021-06-12 RX ADMIN — INSULIN LISPRO 5 UNITS: 100 INJECTION, SOLUTION INTRAVENOUS; SUBCUTANEOUS at 17:55

## 2021-06-12 RX ADMIN — INSULIN LISPRO 5 UNITS: 100 INJECTION, SOLUTION INTRAVENOUS; SUBCUTANEOUS at 17:56

## 2021-06-12 RX ADMIN — AMLODIPINE BESYLATE 10 MG: 5 TABLET ORAL at 08:18

## 2021-06-12 RX ADMIN — LEVOTHYROXINE SODIUM 25 MCG: 0.03 TABLET ORAL at 06:00

## 2021-06-12 RX ADMIN — Medication 10 ML: at 22:40

## 2021-06-12 RX ADMIN — ATORVASTATIN CALCIUM 40 MG: 40 TABLET, FILM COATED ORAL at 08:17

## 2021-06-12 RX ADMIN — INSULIN LISPRO 3 UNITS: 100 INJECTION, SOLUTION INTRAVENOUS; SUBCUTANEOUS at 12:16

## 2021-06-12 RX ADMIN — INSULIN LISPRO 5 UNITS: 100 INJECTION, SOLUTION INTRAVENOUS; SUBCUTANEOUS at 12:16

## 2021-06-12 RX ADMIN — ESCITALOPRAM OXALATE 10 MG: 10 TABLET ORAL at 08:17

## 2021-06-12 RX ADMIN — HEPARIN SODIUM 5000 UNITS: 5000 INJECTION INTRAVENOUS; SUBCUTANEOUS at 22:40

## 2021-06-12 RX ADMIN — LOSARTAN POTASSIUM 50 MG: 50 TABLET, FILM COATED ORAL at 08:17

## 2021-06-12 RX ADMIN — POVIDONE-IODINE 1 APPLICATION: 10 SOLUTION TOPICAL at 08:18

## 2021-06-12 RX ADMIN — DOCUSATE SODIUM 100 MG: 100 CAPSULE, LIQUID FILLED ORAL at 08:17

## 2021-06-12 RX ADMIN — HYDRALAZINE HYDROCHLORIDE 50 MG: 25 TABLET, FILM COATED ORAL at 22:37

## 2021-06-12 RX ADMIN — Medication 10 ML: at 08:17

## 2021-06-12 RX ADMIN — ASPIRIN 81 MG: 81 TABLET, COATED ORAL at 08:17

## 2021-06-12 RX ADMIN — INSULIN GLARGINE 10 UNITS: 100 INJECTION, SOLUTION SUBCUTANEOUS at 17:56

## 2021-06-12 RX ADMIN — HEPARIN SODIUM 5000 UNITS: 5000 INJECTION INTRAVENOUS; SUBCUTANEOUS at 08:17

## 2021-06-12 RX ADMIN — METOPROLOL SUCCINATE 50 MG: 50 TABLET, EXTENDED RELEASE ORAL at 08:17

## 2021-06-12 RX ADMIN — Medication 600 MG: at 08:18

## 2021-06-12 RX ADMIN — LEVETIRACETAM 500 MG: 500 TABLET ORAL at 22:37

## 2021-06-12 RX ADMIN — LEVETIRACETAM 500 MG: 500 TABLET ORAL at 08:18

## 2021-06-12 RX ADMIN — TAMSULOSIN HYDROCHLORIDE 0.4 MG: 0.4 CAPSULE ORAL at 22:40

## 2021-06-12 NOTE — PROGRESS NOTES
"   LOS: 8 days    Patient Care Team:  Marizol Puentes MD as PCP - General (Family Medicine)      Subjective     Patient resting comfortably.  Denies any complaint this morning  S/p PCI yesterday.  No acute distress overnight    Objective     Vital Sign Min/Max for last 24 hours  Temp:  [97.4 °F (36.3 °C)-98.2 °F (36.8 °C)] 97.4 °F (36.3 °C)  Heart Rate:  [58-75] 58  Resp:  [16-20] 20  BP: (135-176)/(70-96) 154/74                       Flowsheet Rows      First Filed Value   Admission Height  175.3 cm (69\") Documented at 06/04/2021 2120   Admission Weight  107 kg (234 lb 12.6 oz) Documented at 06/04/2021 2120          No intake/output data recorded.  I/O last 3 completed shifts:  In: 920 [P.O.:920]  Out: 2600 [Urine:2600]    Physical Exam:  Physical Exam    General.  Awake, alert  Head.  Atraumatic, normocephalic  Neck.  Supple.  No JVD  Respiratory.  Clear to auscultation bilaterally.  No wheezing or rhonchi  Cardiovascular.  Normal rate.  Regular rhythm.  S1, S2  Abdomen.  Soft, obese, nontender.  No organomegaly.  Bowel sounds present  Extremities.  No edema.  Pulses palpable  Neurological.  No focal deficit       LABS:  Lab Results   Component Value Date    CALCIUM 8.3 (L) 06/12/2021    PHOS 4.2 06/12/2021     Results from last 7 days   Lab Units 06/12/21  0720 06/12/21  0212 06/11/21  0917 06/11/21  0755 06/10/21  0228 06/09/21  0246 06/07/21  0234 06/06/21  0511   MAGNESIUM mg/dL  --   --   --   --   --  1.7  --   --    SODIUM mmol/L 136  --  137  --  133* 136  --  138   POTASSIUM mmol/L 4.7  --  4.5  --  5.0 4.7  --  4.0   CHLORIDE mmol/L 106  --  106  --  104 103  --  107   CO2 mmol/L 20.0*  --  21.0*  --  20.0* 22.0  --  20.0*   BUN mg/dL 36*  --  34*  --  35* 25*  --  24*   CREATININE mg/dL 1.82*  --  1.82*  --  2.22* 1.97*  --  1.85*   GLUCOSE mg/dL 230*  --  123*  --  174* 92  --  144*   CALCIUM mg/dL 8.3*  --  8.1*  --  8.4* 8.5*  --  7.8*   WBC 10*3/mm3  --  14.30*  --  10.40 12.20*  --    < > " 10.00   HEMOGLOBIN g/dL  --  13.0  --  12.5* 12.4*  --    < > 12.0*   PLATELETS 10*3/mm3  --  206  --  214 241  --    < > 179   ALT (SGPT) U/L  --   --  21  --   --   --   --  7   AST (SGOT) U/L  --   --  17  --   --   --   --  13    < > = values in this interval not displayed.     Lab Results   Component Value Date    CKTOTAL 253 08/23/2018    TROPONINT 0.366 (C) 06/05/2021     Estimated Creatinine Clearance: 49 mL/min (A) (by C-G formula based on SCr of 1.82 mg/dL (H)).      Brief Urine Lab Results  (Last result in the past 365 days)      Color   Clarity   Blood   Leuk Est   Nitrite   Protein   CREAT   Urine HCG        06/05/21 1359             227.3       06/05/21 1359 Yellow Cloudy  Comment:  Result checked  Moderate (2+) Negative Negative >=300 mg/dL (3+)             WEIGHTS:     Wt Readings from Last 1 Encounters:   06/12/21 0536 107 kg (236 lb 15.9 oz)   06/10/21 0453 107 kg (236 lb 8.9 oz)   06/09/21 1502 107 kg (236 lb)   06/09/21 0516 107 kg (236 lb 1.8 oz)   06/08/21 0559 107 kg (235 lb 7.2 oz)   06/07/21 0500 110 kg (242 lb 8.1 oz)   06/06/21 0553 107 kg (235 lb 10.8 oz)   06/04/21 2120 107 kg (234 lb 12.6 oz)       Acetylcysteine, 600 mg, Oral, BID  amLODIPine, 10 mg, Oral, Daily  aspirin, 81 mg, Oral, Daily  atorvastatin, 40 mg, Oral, Daily  clopidogrel, 75 mg, Oral, Daily  docusate sodium, 100 mg, Oral, Daily  escitalopram, 10 mg, Oral, Daily  heparin (porcine), 5,000 Units, Subcutaneous, Q12H  hydrALAZINE, 50 mg, Oral, BID  insulin glargine, 10 Units, Subcutaneous, Daily With Dinner  insulin lispro, 0-14 Units, Subcutaneous, TID AC  insulin lispro, 5 Units, Subcutaneous, TID With Meals  levETIRAcetam, 500 mg, Oral, BID  levothyroxine, 25 mcg, Oral, Q AM  losartan, 50 mg, Oral, Q24H  metoprolol succinate XL, 50 mg, Oral, Q24H  povidone-iodine, , Topical, Daily  sodium chloride, 10 mL, Intravenous, Q12H  tamsulosin, 0.4 mg, Oral, Nightly      sodium chloride, 1-3 mL/kg/hr  sodium chloride, 100 mL/hr,  Last Rate: 100 mL/hr (06/09/21 0732)  sodium chloride, 75 mL/hr, Last Rate: Stopped (06/09/21 1830)  sodium chloride, 75 mL/hr, Last Rate: 75 mL/hr (06/11/21 2030)        Assessment/Plan       1.Acute kidney injury on CKD 3  Patient's creatinine stable around 1.8 mg/dL.  Patient has underlying chronic kidney disease with proteinuria due to diabetic glomerulosclerosis  S/p PCI yesterday.  Creatinine remained stable  Discontinue IV fluids    2.Hypertension  Blood pressure Acceptable  Continue current antihypertensives    3.  Proteinuria  Secondary to diabetic glomerulosclerosis  Continue ARB    4.Coronary artery disease  S/p PCI to mid LAD and diagonal branch of LAD  Cardiology following      Sherman MD Suman  06/12/21  09:59 EDT

## 2021-06-12 NOTE — PROGRESS NOTES
CARDIOLOGY FOLLOW-UP PROGRESS NOTE      Reason for follow-up:    Elevated troponin    -1.0 at outlying facility, 0.233 here  CAD with prior PCI to LCx, RCA 2011  Uncontrolled HTN:  Pt has stopped all antihypertensive agents for last month  DM  Dyslipidemia  Hx Seizure disorder with possible Seizure  Non compliance with medical therapy     Attending: Caty Marroquin MD      SUBJECTIVE:    No chest pain or shortness of breath.  Lying in bed comfortably     Review of Systems   General: denies fever, chills, anorexia, weight loss  Eyes: denies blurring, diplopia  Ear/Nose/Throat: denies ear pain, nosebleeds, hoarseness  Cardiovascular: See HPI  Respiratory: denies excessive sputum, hemoptysis, wheezing  Gastrointestinal: denies nausea, vomiting, change in bowel habits, abdominal pain  Genitourinary: denies dysuria and hematuria  Musculoskeletal: denies back pain, joint pain, joint swelling, muscle cramps, weakness  Skin: denies rashes, itching, suspicious lesions  Neurologic: denies focal neuro deficits  Psychiatric: denies depression, anxiety  Endocrine: denies cold intolerance, heat intolerance  Hematologic/Lymphatic: denies abnormal bruising, bleeding  Allergic/Immunologic: denies urticaria or persistent infections      Allergies: Shellfish allergy    Scheduled Meds:Acetylcysteine, 600 mg, Oral, BID  amLODIPine, 10 mg, Oral, Daily  aspirin, 81 mg, Oral, Daily  atorvastatin, 40 mg, Oral, Daily  clopidogrel, 75 mg, Oral, Daily  docusate sodium, 100 mg, Oral, Daily  escitalopram, 10 mg, Oral, Daily  heparin (porcine), 5,000 Units, Subcutaneous, Q12H  hydrALAZINE, 50 mg, Oral, BID  insulin glargine, 10 Units, Subcutaneous, Daily With Dinner  insulin lispro, 0-14 Units, Subcutaneous, TID AC  insulin lispro, 5 Units, Subcutaneous, TID With Meals  levETIRAcetam, 500 mg, Oral, BID  levothyroxine, 25 mcg, Oral, Q AM  losartan, 50 mg, Oral, Q24H  metoprolol succinate XL, 50 mg, Oral, Q24H  povidone-iodine, , Topical,  Daily  sodium chloride, 10 mL, Intravenous, Q12H  tamsulosin, 0.4 mg, Oral, Nightly        Continuous Infusions:sodium chloride, 1-3 mL/kg/hr        PRN Meds:.•  acetaminophen  •  acetaminophen  •  atropine  •  dextrose  •  dextrose  •  glucagon (human recombinant)  •  hydrALAZINE  •  insulin lispro **AND** insulin lispro  •  ondansetron  •  sodium chloride  •  sodium chloride  •  sodium chloride    Objective     Vital Signs  Vitals:    06/11/21 2253 06/12/21 0222 06/12/21 0536 06/12/21 0900   BP: 165/93 146/76 154/74 156/80   BP Location:  Right arm     Patient Position:  Lying     Pulse: 74  58 64   Resp:  18 20 16   Temp:  97.7 °F (36.5 °C) 97.4 °F (36.3 °C) 97.8 °F (36.6 °C)   TempSrc:  Oral Oral    SpO2:  97% 98% 96%   Weight:   107 kg (236 lb 15.9 oz)    Height:         Wt Readings from Last 1 Encounters:   06/12/21 107 kg (236 lb 15.9 oz)       Physical Exam:     General Appearance:    Alert, oriented, no acute distress   Neck:  Supple without JVD or bruit   Lungs:    Grossly clear with adequate airflow bilaterally    Heart:    Regular rate and rhythm, normal S1 and S2, no            murmur, no gallop, no rub, no click   Chest Wall/Thorax:    No abnormalities observed   Abdomen:     Normal bowel sounds, no masses, no organomegaly, soft        non-tender, non-distended, no guarding, no rebound                tenderness   Extremities:  Range of motion adequate, no edema, no cyanosis, no             redness   Pulses:   Pulses palpable and equal bilaterally   Skin:   No bleeding, bruising or rash   Neurologic:  No focal deficits               Results Review:     CBC    Results from last 7 days   Lab Units 06/12/21  0212 06/11/21  0755 06/10/21  0228 06/09/21  0640 06/08/21  1501 06/08/21  0250 06/06/21  0511   WBC 10*3/mm3 14.30* 10.40 12.20* 14.40* 10.10 11.20* 10.00   HEMOGLOBIN g/dL 13.0 12.5* 12.4* 13.0 12.5* 12.9* 12.0*   PLATELETS 10*3/mm3 206 214 241 230 222 190 179     BMP   Results from last 7 days   Lab  Units 06/12/21  0720 06/11/21  0917 06/10/21  0228 06/09/21  0246 06/08/21  1501 06/08/21  0250 06/07/21  0234   SODIUM mmol/L 136 137 133* 136 136 136 136   POTASSIUM mmol/L 4.7 4.5 5.0 4.7 4.5 4.2 4.1   CHLORIDE mmol/L 106 106 104 103 106 106 107   CO2 mmol/L 20.0* 21.0* 20.0* 22.0 22.0 18.0* 19.0*   BUN mg/dL 36* 34* 35* 25* 22 22 24*   CREATININE mg/dL 1.82* 1.82* 2.22* 1.97* 1.76* 1.66* 1.94*   GLUCOSE mg/dL 230* 123* 174* 92 143* 98 137*   MAGNESIUM mg/dL  --   --   --  1.7  --   --   --    PHOSPHORUS mg/dL 4.2  --  3.5 4.1  --  3.8  --      HbA1C   Lab Results   Component Value Date    HGBA1C 6.4 (H) 06/05/2021    HGBA1C 7.1 (H) 12/31/2019    HGBA1C 7.6 (H) 07/08/2019     CMP   Results from last 7 days   Lab Units 06/12/21  0720 06/11/21  0917 06/10/21  0228 06/09/21  0246 06/08/21  1501 06/08/21  0250 06/07/21  0234 06/06/21  0511   SODIUM mmol/L 136 137 133* 136 136 136 136 138   POTASSIUM mmol/L 4.7 4.5 5.0 4.7 4.5 4.2 4.1 4.0   CHLORIDE mmol/L 106 106 104 103 106 106 107 107   CO2 mmol/L 20.0* 21.0* 20.0* 22.0 22.0 18.0* 19.0* 20.0*   BUN mg/dL 36* 34* 35* 25* 22 22 24* 24*   CREATININE mg/dL 1.82* 1.82* 2.22* 1.97* 1.76* 1.66* 1.94* 1.85*   GLUCOSE mg/dL 230* 123* 174* 92 143* 98 137* 144*   ALBUMIN g/dL 3.10* 2.90* 3.10* 2.80*  --  2.70*  --  2.80*   BILIRUBIN mg/dL  --  0.3  --   --   --   --   --  0.5   ALK PHOS U/L  --  100  --   --   --   --   --  102   AST (SGOT) U/L  --  17  --   --   --   --   --  13   ALT (SGPT) U/L  --  21  --   --   --   --   --  7     Radiology(recent) No radiology results for the last day    Cardiac Studies:  No new studies; telemetry shows sinus rhythm    Medication Review:   Scheduled Meds:Acetylcysteine, 600 mg, Oral, BID  amLODIPine, 10 mg, Oral, Daily  aspirin, 81 mg, Oral, Daily  atorvastatin, 40 mg, Oral, Daily  clopidogrel, 75 mg, Oral, Daily  docusate sodium, 100 mg, Oral, Daily  escitalopram, 10 mg, Oral, Daily  heparin (porcine), 5,000 Units, Subcutaneous,  Q12H  hydrALAZINE, 50 mg, Oral, BID  insulin glargine, 10 Units, Subcutaneous, Daily With Dinner  insulin lispro, 0-14 Units, Subcutaneous, TID AC  insulin lispro, 5 Units, Subcutaneous, TID With Meals  levETIRAcetam, 500 mg, Oral, BID  levothyroxine, 25 mcg, Oral, Q AM  losartan, 50 mg, Oral, Q24H  metoprolol succinate XL, 50 mg, Oral, Q24H  povidone-iodine, , Topical, Daily  sodium chloride, 10 mL, Intravenous, Q12H  tamsulosin, 0.4 mg, Oral, Nightly      Continuous Infusions:sodium chloride, 1-3 mL/kg/hr      PRN Meds:.•  acetaminophen  •  acetaminophen  •  atropine  •  dextrose  •  dextrose  •  glucagon (human recombinant)  •  hydrALAZINE  •  insulin lispro **AND** insulin lispro  •  ondansetron  •  sodium chloride  •  sodium chloride  •  sodium chloride    Assessment:  levated troponin    -1.0 at outlying facility, 0.233 here  CAD with prior PCI to LCx, RCA 2011  Uncontrolled HTN:  Pt has stopped all antihypertensive agents for last month  DM  Dyslipidemia  Hx Seizure disorder with possible Seizure  Non compliance with medical therapy  Combined hyperlipidemia      Plan:  Patient presented after a seizure  Patient did not have any chest pain but had elevated troponin consistent with is a non-STEMI or versus renal insufficiency  Patient had previous stent placement to the circumflex artery and RCA  Patient blood pressure very high and his medications have been adjusted  Patient sugar levels and lipid levels are followed by the primary care doctor  Patient is noncompliant with medications also.  Patient had a stress test which was abnormal  Patient had a cardiac catheterization which showed severe three-vessel coronary artery disease  Patient's films are reviewed by surgeons.  Patient is felt not to be a candidate for surgery  Discussed with patient and I will discuss with his son also about performing stents to probably 1 or 2 arteries  Discussed with patient about the risks and benefits including the risk of  heart attack stroke and death and arrhythmias during the procedure.    Patient had renal insufficiency and was cleared by the nephrologist for the procedure and renal function will be followed.  Immanuel Field MD  06/12/21  14:33 EDT      This report was generated using the Dragon voice recognition system.  Patient denies any new cardiac symptoms  Denies any chest pain  Need for compliance with medical therapy reviewed and discussed with patient  Status post PCI and stenting of the LAD and also balloon angioplasty of the diagonal branch  Findings reviewed and discussed the patient  Nephrology following for renal failure  Renal function is stable for now  Labs and medications reviewed  Continue dual antiplatelet therapy with aspirin and Plavix  Discussed with patient at bedside

## 2021-06-12 NOTE — PLAN OF CARE
Goal Outcome Evaluation:      Pt worked with physical therapy today and walked around the room. On room air. Rested well during this shift. Received stent to LAD last night. Waiting on rehab placement. Will continue to monitor  Problem: Adult Inpatient Plan of Care  Goal: Absence of Hospital-Acquired Illness or Injury  Intervention: Identify and Manage Fall Risk  Recent Flowsheet Documentation  Taken 6/12/2021 1638 by Kathia Laura RN  Safety Promotion/Fall Prevention: safety round/check completed  Taken 6/12/2021 1400 by Kathia Laura RN  Safety Promotion/Fall Prevention: safety round/check completed  Taken 6/12/2021 1220 by Kathia Laura RN  Safety Promotion/Fall Prevention: safety round/check completed  Taken 6/12/2021 1035 by Kathia Laura RN  Safety Promotion/Fall Prevention: safety round/check completed  Taken 6/12/2021 0845 by Kathia Laura RN  Safety Promotion/Fall Prevention: safety round/check completed     Problem: Fall Injury Risk  Goal: Absence of Fall and Fall-Related Injury  Intervention: Promote Injury-Free Environment  Recent Flowsheet Documentation  Taken 6/12/2021 1638 by Kathia Laura RN  Safety Promotion/Fall Prevention: safety round/check completed  Taken 6/12/2021 1400 by Kathia Laura RN  Safety Promotion/Fall Prevention: safety round/check completed  Taken 6/12/2021 1220 by Kathia Laura RN  Safety Promotion/Fall Prevention: safety round/check completed  Taken 6/12/2021 1035 by Kathia Laura RN  Safety Promotion/Fall Prevention: safety round/check completed  Taken 6/12/2021 0845 by Kathia Laura RN  Safety Promotion/Fall Prevention: safety round/check completed

## 2021-06-12 NOTE — PROGRESS NOTES
[facility] Medicine Services Daily Progress Note      Hospitalist Team  LOS 8 days      Patient Care Team:  Marizol Puentes MD as PCP - General (Family Medicine)    Patient Location: 2119/1      Subjective   Subjective     Chief Complaint / Subjective  F/u elevated bp and troponin     Brief Synopsis of Hospital Course/HPI  Isaias Molina is a 65 y.o. male who presented to [facility] from the ED at [facility] in [place]. He reports that he was having uncontrolled blood pressure at home.      Patient has a history of coronary artery disease with previous PCI to the left circumflex and RCA in 2011.     The patient reports in the last month he has stopped many of his medications including antihypertensive agents, his statin and Plavix.     He reports that he was having periods of lightheadedness and dizziness and thought his blood pressure was running low so he stopped these medications.     He reports with Covid he has not been out to the doctor and missed several follow-ups.     He denies any exertional chest pain or dyspnea but reports yesterday he was feeling unwell which prompted his visit to the emergency room.     Yesterday he was unaware of having a seizure. However this morning he reports he thinks he had a seizure yesterday because his muscles feel sore and tight and he bit his tongue.     He denies any current chest pain  In the emergency room at [facility] it was reported that his troponin was 1.0       6/5- Patient denies any further chest pain. Reports he thinks he might have had a seizure yesterday due to muscle cramping today.     6/6- Patient denies any complaint.    6/7- Patient reports no specific complaints at this time.  He had multiple questions related to his current meds and plan of treatment and all questions were answered.    6/8- Patient pt reports uncontrolled symptoms of depression (the bedside nurse also alerted me to this concern).  " The patient reports lexapro was working for the last 3 years, but effectiveness had declined prior to stopping it 2 months ago.  He is willing to try it again.    6/9- Patient reports no complaints. He has multiple concerns and was extensively counseled.    6/10-patient was seen prior to heart cath and he denies current complaints.  He was counseled and all questions were answered.    6/11-patient was advised by the surgeon that he is not a surgical candidate.  He is awaiting cardiac cath with stents.  He denies current complaints.  He was counseled and all questions were answered.    6/12-patient reports doing well after his heart cath yesterday evening.  He denies current complaints.      ROS12 point review of systems was reviewed and was negative except as above.      Objective   Objective      Vital Signs  Temp:  [97.4 °F (36.3 °C)-98.1 °F (36.7 °C)] 98.1 °F (36.7 °C)  Heart Rate:  [58-75] 67  Resp:  [16-20] 16  BP: (146-176)/(72-96) 146/74  Oxygen Therapy  SpO2: 97 %  Pulse Oximetry Type: Intermittent  Device (Oxygen Therapy): room air  Flowsheet Rows        First Filed Value   Admission Height  175.3 cm (69\") Documented at 06/04/2021 2120   Admission Weight  107 kg (234 lb 12.6 oz) Documented at 06/04/2021 2120          Intake & Output (last 3 days)       06/09 0701 - 06/10 0700 06/10 0701 - 06/11 0700 06/11 0701 - 06/12 0700 06/12 0701 - 06/13 0700    P.O. 600 1200 680     Total Intake(mL/kg) 600 (5.6) 1200 (11.2) 680 (6.4)     Urine (mL/kg/hr) 525 (0.2) 1000 (0.4) 2000 (0.8)     Total Output 525 1000 2000     Net +75 +200 -1320             Urine Unmeasured Occurrence 3 x 2 x          Lines, Drains & Airways      Active LDAs       Name:   Placement date:   Placement time:   Site:   Days:    Peripheral IV 06/04/21  Posterior;Right Wrist   06/04/21    -- unknown    Wrist   1    Peripheral IV 06/05/21 0514 Left;Posterior Hand   06/05/21 0514    Hand   less than 1                      Physical " Exam:    Physical Exam  Vital signs and nurses notes reviewed.  Exam unchanged from 6/11/2021. well-developed well-nourished gentleman in no acute distress sitting up in bed awake and alert; mucous membranes moist; sclerae anicteric; lungs clear to auscultation bilaterally; CV regular rate and rhythm; abdomen soft nontender nondistended with active bowel sounds; extremities with no edema, cyanosis or calf tenderness; palpable pedal pulses bilaterally; right groin soft with no ecchymosis and clean dry dressing in place not removed; no Flowers catheter.       Wounds (last 24 hours)      LDA Wound     Row Name 06/12/21 1638 06/12/21 1220 06/12/21 0845       Wound 06/08/21 0800 Left medial great toe Diabetic Ulcer    Wound - Properties Group Placement Date: 06/08/21 -BB Placement Time: 0800 -BB Side: Left  -BB Orientation: medial  -BB Location: great toe  -BB Primary Wound Type: Diabetic ulc  -BB    Base  scab  -CW  scab  -CW  scab  -CW    Periwound  blanchable;redness  -CW  blanchable;redness  -CW  blanchable;redness  -CW    Periwound Temperature  warm  -CW  warm  -CW  warm  -CW    Retired Wound - Properties Group Date first assessed: 06/08/21 -BB Time first assessed: 0800 -BB Side: Left  -BB Location: great toe  -BB Primary Wound Type: Diabetic ulc  -BB    Row Name 06/12/21 0400 06/12/21 0000 06/11/21 2000       Wound 06/08/21 0800 Left medial great toe Diabetic Ulcer    Wound - Properties Group Placement Date: 06/08/21 -BB Placement Time: 0800 -BB Side: Left  -BB Orientation: medial  -BB Location: great toe  -BB Primary Wound Type: Diabetic ulc  -BB    Dressing Appearance  open to air  -MS  open to air  -MS  open to air  -MS    Base  scab  -MS  scab  -MS  scab  -MS    Periwound  blanchable;redness  -MS  blanchable;redness  -MS  blanchable;redness  -MS    Periwound Temperature  warm  -MS  warm  -MS  warm  -MS    Retired Wound - Properties Group Date first assessed: 06/08/21 -BB Time first assessed: 0800 -BB  Side: Left  -BB Location: great toe  -BB Primary Wound Type: Diabetic ulc  -BB      User Key  (r) = Recorded By, (t) = Taken By, (c) = Cosigned By    Initials Name Provider Type    BB Brunner, Brittany, RN Registered Nurse    Becca Garcia, RN Registered Nurse    Kathia Tafoya RN Registered Nurse          Procedures:              Results Review:     I reviewed the patient's new clinical results.      Lab Results (last 24 hours)     Procedure Component Value Units Date/Time    POC Glucose Once [[id number]]  (Abnormal) Collected: 06/12/21 1623    Specimen: Blood Updated: 06/12/21 1624     Glucose 207 mg/dL      Comment: Serial Number: [id number]:  [id number]       POC Glucose Once [[id number]]  (Abnormal) Collected: 06/12/21 1201    Specimen: Blood Updated: 06/12/21 1202     Glucose 185 mg/dL      Comment: Serial Number: [id number]:  [id number]       POC Glucose Once [[id number]]  (Abnormal) Collected: 06/12/21 0818    Specimen: Blood Updated: 06/12/21 0818     Glucose 224 mg/dL      Comment: Serial Number: [id number]:  [id number]       Renal Function Panel [[id number]]  (Abnormal) Collected: 06/12/21 0720    Specimen: Blood Updated: 06/12/21 0750     Glucose 230 mg/dL      BUN 36 mg/dL      Creatinine 1.82 mg/dL      Sodium 136 mmol/L      Potassium 4.7 mmol/L      Chloride 106 mmol/L      CO2 20.0 mmol/L      Calcium 8.3 mg/dL      Albumin 3.10 g/dL      Phosphorus 4.2 mg/dL      Anion Gap 10.0 mmol/L      BUN/Creatinine Ratio 19.8     eGFR Non African Amer 38 mL/min/1.73     Narrative:      GFR Normal >60  Chronic Kidney Disease <60  Kidney Failure <15      CBC & Differential [[id number]]  (Abnormal) Collected: 06/12/21 0212    Specimen: Blood Updated: 06/12/21 0248    Narrative:      The following orders were created for panel order CBC & Differential.  Procedure                               Abnormality         Status                     ---------                                -----------         ------                     CBC Auto Differential[[id number]]        Abnormal            Final result                 Please view results for these tests on the individual orders.    CBC Auto Differential [[id number]]  (Abnormal) Collected: 06/12/21 0212    Specimen: Blood Updated: 06/12/21 0248     WBC 14.30 10*3/mm3      RBC 4.70 10*6/mm3      Hemoglobin 13.0 g/dL      Hematocrit 37.8 %      MCV 80.4 fL      MCH 27.7 pg      MCHC 34.5 g/dL      RDW 14.9 %      RDW-SD 42.4 fl      MPV 9.9 fL      Platelets 206 10*3/mm3      Neutrophil % 93.9 %      Lymphocyte % 4.8 %      Monocyte % 0.9 %      Eosinophil % 0.1 %      Basophil % 0.3 %      Neutrophils, Absolute 13.40 10*3/mm3      Lymphocytes, Absolute 0.70 10*3/mm3      Monocytes, Absolute 0.10 10*3/mm3      Eosinophils, Absolute 0.00 10*3/mm3      Basophils, Absolute 0.00 10*3/mm3      nRBC 0.0 /100 WBC         No results found for: HGBA1C  Results from last 7 days   Lab Units 06/09/21  0640   INR  0.97           No results found for: LIPASE  Lab Results   Component Value Date    CHOL 166 06/05/2021    CHLPL 278 (H) 12/31/2019    TRIG 308 (H) 06/05/2021    HDL 22 (L) 06/05/2021    LDL 92 06/05/2021       No results found for: INTRAOP, PREDX, FINALDX, COMDX    Microbiology Results (last 10 days)     Procedure Component Value - Date/Time    COVID PRE-OP / PRE-PROCEDURE SCREENING ORDER (NO ISOLATION) - Swab, Nasopharynx [[id number]]  (Normal) Collected: 06/07/21 0633    Lab Status: Final result Specimen: Swab from Nasopharynx Updated: 06/07/21 0731    Narrative:      The following orders were created for panel order COVID PRE-OP / PRE-PROCEDURE SCREENING ORDER (NO ISOLATION) - Swab, Nasopharynx.  Procedure                               Abnormality         Status                     ---------                               -----------         ------                     COVID-19,CEPHEID,COR/ERVIN...[[id number]]  Normal              Final result                  Please view results for these tests on the individual orders.    COVID-19,CEPHEID,COR/ERVIN/PAD/YANIV IN-HOUSE(OR EMERGENT/ADD-ON),NP SWAB IN TRANSPORT MEDIA 3-4 HR TAT, RT-PCR - Swab, Nasopharynx [[id number]]  (Normal) Collected: 06/07/21 0633    Lab Status: Final result Specimen: Swab from Nasopharynx Updated: 06/07/21 0731     COVID19 Not Detected    Narrative:      Fact sheet for providers: https://www.fda.gov/media/699980/download     Fact sheet for patients: https://www.fda.gov/media/440857/download  Fact sheet for providers: https://www.fda.gov/media/423436/download     Fact sheet for patients: https://www.fda.gov/media/639511/download          ECG/EMG Results (most recent)     Procedure Component Value Units Date/Time    SCANNED EKG [[id number]] Resulted: 06/04/21     Updated: 06/08/21 2201    Adult Transthoracic Echo Complete W/ Cont if Necessary Per Protocol [[id number]] Collected: 06/09/21 1505     Updated: 06/10/21 2120     BSA 2.2 m^2      RVIDd 2.5 cm      IVSd 1.3 cm      LVIDd 3.7 cm      LVIDs 2.6 cm      LVPWd 1.2 cm      IVS/LVPW 1.1     FS 30.8 %      EDV(Teich) 59.9 ml      ESV(Teich) 24.5 ml      EF(Teich) 59.2 %      EDV(cubed) 52.6 ml      ESV(cubed) 17.4 ml      EF(cubed) 66.8 %      LV mass(C)d 154.2 grams      LV mass(C)dI 69.6 grams/m^2      SV(Teich) 35.4 ml      SI(Teich) 16.0 ml/m^2      SV(cubed) 35.1 ml      SI(cubed) 15.9 ml/m^2      Ao root diam 3.2 cm      Ao root area 8.0 cm^2      ACS 2.3 cm      LVOT diam 2.3 cm      LVOT area 4.0 cm^2      EDV(MOD-sp4) 99.3 ml      ESV(MOD-sp4) 28.9 ml      EF(MOD-sp4) 70.9 %      SV(MOD-sp4) 70.3 ml      SI(MOD-sp4) 31.7 ml/m^2      Ao root area (BSA corrected) 1.4     LV Emery Vol (BSA corrected) 44.8 ml/m^2      LV Sys Vol (BSA corrected) 13.1 ml/m^2      MV E max radha 83.9 cm/sec      MV A max radha 114.7 cm/sec      MV E/A 0.73     MV V2 max 120.1 cm/sec      MV max PG 5.8 mmHg      MV V2 mean 72.7 cm/sec      MV mean PG 2.4 mmHg      MV V2 VTI  32.5 cm      MVA(VTI) 2.9 cm^2      MV dec slope 371.3 cm/sec^2      MV dec time 0.23 sec      Ao pk tal 138.7 cm/sec      Ao max PG 7.7 mmHg      Ao max PG (full) 2.0 mmHg      Ao V2 mean 90.6 cm/sec      Ao mean PG 3.8 mmHg      Ao mean PG (full) 0.73 mmHg      Ao V2 VTI 23.8 cm      PATTI(I,A) 3.9 cm^2      PATTI(I,D) 3.9 cm^2      PATTI(V,A) 3.4 cm^2      PATTI(V,D) 3.4 cm^2      LV V1 max PG 5.7 mmHg      LV V1 mean PG 3.1 mmHg      LV V1 max 119.2 cm/sec      LV V1 mean 80.5 cm/sec      LV V1 VTI 23.4 cm      SV(Ao) 190.8 ml      SI(Ao) 86.1 ml/m^2      SV(LVOT) 93.5 ml      SI(LVOT) 42.2 ml/m^2      PA V2 max 76.6 cm/sec      PA max PG 2.3 mmHg      PA max PG (full) 0.3 mmHg      PA V2 mean 55.8 cm/sec      PA mean PG 1.4 mmHg      PA mean PG (full) 0.38 mmHg      PA V2 VTI 15.2 cm      PA acc time 0.16 sec      RV V1 max PG 2.1 mmHg      RV V1 mean PG 1.1 mmHg      RV V1 max 71.6 cm/sec      RV V1 mean 47.4 cm/sec      RV V1 VTI 18.4 cm      TR max tal 131.0 cm/sec      RVSP(TR) 9.9 mmHg      RAP systole 3.0 mmHg      PA pr(Accel) 6.3 mmHg      Pulm Sys Tal 41.1 cm/sec      Pulm Emery Tal 27.0 cm/sec      Pulm S/D 1.5     Pulm A Revs Dur 0.12 sec      Pulm A Revs Tal 25.3 cm/sec       CV ECHO HANNAH - BZI_BMI 34.9 kilograms/m^2       CV ECHO HANNAH - BSA(HAYCOCK) 2.3 m^2       CV ECHO HANNAH - BZI_METRIC_WEIGHT 107.0 kg       CV ECHO HANNAH - BZI_METRIC_HEIGHT 175.3 cm      LA dimension(2D) 4.1 cm     Narrative:      · Left ventricular ejection fraction appears to be 61 - 65%.  · No pericardial effusion noted             Results for orders placed during the hospital encounter of 06/04/21    Duplex Vein Mapping Lower Extremity - Bilateral CAR    Interpretation Summary  · Right greater saphenous vein above knee: with adequate size. • Right greater saphenous vein below knee: with adequate size. • Left greater saphenous vein above knee: with inadequate size. • Left greater saphenous vein below knee: with inadequate  size.      Results for orders placed during the hospital encounter of 06/04/21    Adult Transthoracic Echo Complete W/ Cont if Necessary Per Protocol    Interpretation Summary  · Left ventricular ejection fraction appears to be 61 - 65%.  · No pericardial effusion noted      US Renal Bilateral    Result Date: 6/5/2021  1. No evidence of acute urinary obstruction. 2. Enlarged prostate. Electronically signed by:  Jesu Spicer M.D.  6/5/2021 6:34 PM          Xrays, labs reviewed personally by physician.    Medication Review:   I have reviewed the patient's current medication list      Scheduled Meds  Acetylcysteine, 600 mg, Oral, BID  amLODIPine, 10 mg, Oral, Daily  aspirin, 81 mg, Oral, Daily  atorvastatin, 40 mg, Oral, Daily  clopidogrel, 75 mg, Oral, Daily  docusate sodium, 100 mg, Oral, Daily  escitalopram, 10 mg, Oral, Daily  heparin (porcine), 5,000 Units, Subcutaneous, Q12H  hydrALAZINE, 50 mg, Oral, BID  insulin glargine, 10 Units, Subcutaneous, Daily With Dinner  insulin lispro, 0-14 Units, Subcutaneous, TID AC  insulin lispro, 5 Units, Subcutaneous, TID With Meals  levETIRAcetam, 500 mg, Oral, BID  levothyroxine, 25 mcg, Oral, Q AM  losartan, 50 mg, Oral, Q24H  metoprolol succinate XL, 50 mg, Oral, Q24H  povidone-iodine, , Topical, Daily  sodium chloride, 10 mL, Intravenous, Q12H  tamsulosin, 0.4 mg, Oral, Nightly        Meds Infusions  sodium chloride, 1-3 mL/kg/hr        Meds PRN  •  acetaminophen  •  acetaminophen  •  atropine  •  dextrose  •  dextrose  •  glucagon (human recombinant)  •  hydrALAZINE  •  insulin lispro **AND** insulin lispro  •  ondansetron  •  sodium chloride  •  sodium chloride  •  sodium chloride        Assessment/Plan   Assessment/Plan     Active Hospital Problems    Diagnosis  POA   • **Acute kidney injury superimposed on chronic kidney disease (CMS/HCC) [N17.9, N18.9]  Yes   • Hypertensive emergency [I16.1]  Yes   • Elevated troponin level [R77.8]  Yes   • Abnormal nuclear stress  test [R94.39]  Unknown      Resolved Hospital Problems   No resolved problems to display.       MEDICAL DECISION MAKING COMPLEXITY BY PROBLEM:     Coronary artery disease status post PCI and stents  -Cath performed 6/9/2021 showed multivessel coronary artery disease  -CV surgery consulted and patient is not felt to be a candidate for surgery  -Dr. Robles cardiology performed PCI and stent to the LAD and balloon angioplasty of the diagonal branch on 6/11/2021  - Continue aspirin and Plavix    Hypertensive emergency, resolved  Essential HTN, chronic and controlled  - patient reports noncompliance with home meds due to reported hypotension   - continue amlodipine, metoprolol and hydralazine  -Benazepril and HCTZ held due to DYLON  -Nephrology consulted and added losartan 6/7     Acute Kidney Injury due to prerenal azotemia from medication (ACE inhibitor and HCTZ)  CKD stage 3b due to hypertension  - baseline Cr 1.3-1.6  - Cr trending down with IV fluids   - Avoid nephrotoxic medications  - nephrology consulting    Leukocytosis, likely reactive secondary to above  -Monitor     Uncontrolled IDDM with hyperglycemia and retinopathy  - Hold Metformin  - Hgb a1c 6.4  - continue basal and bolus insulin, dosage increased 6/12/2021  - SSI    Hyperlipidemia  - continue statin  - HDL 22, LDL 92, Triglycerides 308      Hypothyroidism  - pt states he stopped taking thyroid hormone  - TSH 3.74, free T4 0.98     Hx of CVA with with residual vision loss  -CT head negative for evidence of acute intracranial injury;  encephalomalacia in the right occipital and temporal  lobes consistent with old infarct  -Continue aspirin, Plavix and atorvastatin     Seizure disorder   - continue Keppra  - pt reports he may have had a seizure prior to admission, no seizure activity since admission      Depression, chronic and uncontrolled  -Patient reportedly stopped taking home medications 2 months ago   (pt reports lexapro was working for the last 3  years, but effectiveness had declined prior to stopping it 2 months ago)  -Resume Lexapro monitor    BPH with bladder outlet obstruction  -Patient had discontinued flomax due to lightheadedness  - resume Flomax    DVT Prophylaxis  - heparin         VTE Prophylaxis -   Mechanical Order History:       None          Pharmalogical Order History:        Ordered     Dose Route Frequency Stop    06/05/21 0027  heparin (porcine) 5000 UNIT/ML injection 5,000 Units  Status:  Discontinued      5,000 Units SC Every 8 Hours Scheduled 06/05/21 0944    06/05/21 0944  heparin (porcine) 5000 UNIT/ML injection 5,000 Units      5,000 Units SC Every 12 Hours Scheduled --    06/04/21 2311  heparin (porcine) 5000 UNIT/ML injection 5,000 Units  Status:  Discontinued      5,000 Units SC Every 8 Hours Scheduled 06/05/21 0027                      Code Status -   Code Status and Medical Interventions:   Ordered at: 06/04/21 2311     Code Status:    CPR     Medical Interventions (Level of Support Prior to Arrest):    Full             Discharge Planning  Pending progress         Electronically signed by Caty Marroquin MD, 06/12/21, 18:29 EDT.  [facility] Hospitalist Team

## 2021-06-12 NOTE — PLAN OF CARE
Subjective: Pt agreeable to therapeutic plan of care.    Objective:     Bed mobility - Supervision  Transfers - Supervision  Ambulation - 60 feet CGA    Pain: 0 VAS  Education: Provided education on importance of mobility and skilled verbal / tactile cueing throughout intervention.     Assessment: Isaias Molina presents with functional mobility impairments which indicate the need for skilled intervention. Tolerating session today without incident.Patient on 3.5 L O2 Sats 94% and HR 64>71. Presented with shuffle gait due to legally blind.  Will continue to follow and progress as tolerated.     Plan/Recommendations:   Pt would benefit from Inpatient Rehabilitation placement at discharge from facility and requires no DME at discharge.   Pt desires Inpatient Rehabilitation placement at discharge. Pt cooperative; agreeable to therapeutic recommendations and plan of care.     Basic Mobility 6-click:  Rollin = Total, A lot = 2, A little = 3; 4 = None  Supine>Sit:   1 = Total, A lot = 2, A little = 3; 4 = None   Sit>Stand with arms:  1 = Total, A lot = 2, A little = 3; 4 = None  Bed>Chair:   1 = Total, A lot = 2, A little = 3; 4 = None  Ambulate in room:  1 = Total, A lot = 2, A little = 3; 4 = None  3-5 Steps with railin = Total, A lot = 2, A little = 3; 4 = None  Score: 19      Post-Tx Position: Supine with HOB Elevated, Alarms activated and Call light and personal items within reach  PPE: gloves, surgical mask, eyewear protection

## 2021-06-13 VITALS
TEMPERATURE: 98.1 F | BODY MASS INDEX: 35.1 KG/M2 | RESPIRATION RATE: 18 BRPM | OXYGEN SATURATION: 97 % | WEIGHT: 236.99 LBS | HEART RATE: 68 BPM | DIASTOLIC BLOOD PRESSURE: 80 MMHG | HEIGHT: 69 IN | SYSTOLIC BLOOD PRESSURE: 159 MMHG

## 2021-06-13 PROBLEM — N18.31 STAGE 3A CHRONIC KIDNEY DISEASE (HCC): Status: ACTIVE | Noted: 2021-06-13

## 2021-06-13 PROBLEM — R77.8 ELEVATED TROPONIN LEVEL: Status: RESOLVED | Noted: 2021-06-04 | Resolved: 2021-06-13

## 2021-06-13 PROBLEM — I21.4 NON-ST ELEVATION MI (NSTEMI) (HCC): Status: ACTIVE | Noted: 2021-06-13

## 2021-06-13 PROBLEM — I16.1 HYPERTENSIVE EMERGENCY: Status: RESOLVED | Noted: 2021-06-04 | Resolved: 2021-06-13

## 2021-06-13 PROBLEM — R79.89 ELEVATED TROPONIN LEVEL: Status: RESOLVED | Noted: 2021-06-04 | Resolved: 2021-06-13

## 2021-06-13 PROBLEM — N18.32 CHRONIC KIDNEY DISEASE, STAGE 3B (HCC): Status: ACTIVE | Noted: 2021-06-13

## 2021-06-13 LAB
ANION GAP SERPL CALCULATED.3IONS-SCNC: 9 MMOL/L (ref 5–15)
BUN SERPL-MCNC: 35 MG/DL (ref 8–23)
BUN/CREAT SERPL: 19.8 (ref 7–25)
CALCIUM SPEC-SCNC: 8.2 MG/DL (ref 8.6–10.5)
CHLORIDE SERPL-SCNC: 107 MMOL/L (ref 98–107)
CO2 SERPL-SCNC: 20 MMOL/L (ref 22–29)
CREAT SERPL-MCNC: 1.77 MG/DL (ref 0.76–1.27)
GFR SERPL CREATININE-BSD FRML MDRD: 39 ML/MIN/1.73
GLUCOSE BLDC GLUCOMTR-MCNC: 114 MG/DL (ref 70–105)
GLUCOSE BLDC GLUCOMTR-MCNC: 133 MG/DL (ref 70–105)
GLUCOSE BLDC GLUCOMTR-MCNC: 261 MG/DL (ref 70–105)
GLUCOSE SERPL-MCNC: 116 MG/DL (ref 65–99)
POTASSIUM SERPL-SCNC: 4.4 MMOL/L (ref 3.5–5.2)
SODIUM SERPL-SCNC: 136 MMOL/L (ref 136–145)

## 2021-06-13 PROCEDURE — 99239 HOSP IP/OBS DSCHRG MGMT >30: CPT | Performed by: HOSPITALIST

## 2021-06-13 PROCEDURE — 63710000001 INSULIN GLARGINE PER 5 UNITS: Performed by: HOSPITALIST

## 2021-06-13 PROCEDURE — 63710000001 INSULIN LISPRO (HUMAN) PER 5 UNITS: Performed by: INTERNAL MEDICINE

## 2021-06-13 PROCEDURE — 80048 BASIC METABOLIC PNL TOTAL CA: CPT | Performed by: INTERNAL MEDICINE

## 2021-06-13 PROCEDURE — 82962 GLUCOSE BLOOD TEST: CPT

## 2021-06-13 PROCEDURE — 25010000002 HEPARIN (PORCINE) PER 1000 UNITS: Performed by: INTERNAL MEDICINE

## 2021-06-13 PROCEDURE — 99232 SBSQ HOSP IP/OBS MODERATE 35: CPT | Performed by: INTERNAL MEDICINE

## 2021-06-13 PROCEDURE — 63710000001 INSULIN LISPRO (HUMAN) PER 5 UNITS: Performed by: HOSPITALIST

## 2021-06-13 RX ORDER — METOPROLOL SUCCINATE 50 MG/1
50 TABLET, EXTENDED RELEASE ORAL
Start: 2021-06-14

## 2021-06-13 RX ORDER — ACETAMINOPHEN 650 MG
TABLET, EXTENDED RELEASE ORAL DAILY
Start: 2021-06-14

## 2021-06-13 RX ORDER — CYANOCOBALAMIN, ISOPROPYL ALCOHOL 1000MCG/ML
1000 KIT INJECTION
Qty: 1 KIT
Start: 2021-06-13

## 2021-06-13 RX ORDER — ACETAMINOPHEN 325 MG/1
650 TABLET ORAL EVERY 4 HOURS PRN
Start: 2021-06-13

## 2021-06-13 RX ORDER — LOSARTAN POTASSIUM 50 MG/1
100 TABLET ORAL
Status: DISCONTINUED | OUTPATIENT
Start: 2021-06-14 | End: 2021-06-13 | Stop reason: HOSPADM

## 2021-06-13 RX ORDER — HYDRALAZINE HYDROCHLORIDE 50 MG/1
50 TABLET, FILM COATED ORAL 2 TIMES DAILY
Start: 2021-06-13

## 2021-06-13 RX ORDER — INSULIN GLARGINE 100 [IU]/ML
12 INJECTION, SOLUTION SUBCUTANEOUS
Refills: 12
Start: 2021-06-13

## 2021-06-13 RX ORDER — AMLODIPINE BESYLATE 10 MG/1
10 TABLET ORAL DAILY
Start: 2021-06-14

## 2021-06-13 RX ORDER — LOSARTAN POTASSIUM 50 MG/1
50 TABLET ORAL ONCE
Status: COMPLETED | OUTPATIENT
Start: 2021-06-13 | End: 2021-06-13

## 2021-06-13 RX ORDER — LOSARTAN POTASSIUM 100 MG/1
100 TABLET ORAL
Start: 2021-06-14

## 2021-06-13 RX ORDER — INSULIN LISPRO 100 [IU]/ML
6 INJECTION, SOLUTION INTRAVENOUS; SUBCUTANEOUS
Refills: 12
Start: 2021-06-13

## 2021-06-13 RX ADMIN — AMLODIPINE BESYLATE 10 MG: 5 TABLET ORAL at 08:40

## 2021-06-13 RX ADMIN — HEPARIN SODIUM 5000 UNITS: 5000 INJECTION INTRAVENOUS; SUBCUTANEOUS at 19:26

## 2021-06-13 RX ADMIN — LEVOTHYROXINE SODIUM 25 MCG: 0.03 TABLET ORAL at 05:31

## 2021-06-13 RX ADMIN — HYDRALAZINE HYDROCHLORIDE 50 MG: 25 TABLET, FILM COATED ORAL at 19:28

## 2021-06-13 RX ADMIN — METOPROLOL SUCCINATE 50 MG: 50 TABLET, EXTENDED RELEASE ORAL at 08:39

## 2021-06-13 RX ADMIN — Medication 10 ML: at 19:28

## 2021-06-13 RX ADMIN — ATORVASTATIN CALCIUM 40 MG: 40 TABLET, FILM COATED ORAL at 08:40

## 2021-06-13 RX ADMIN — CLOPIDOGREL BISULFATE 75 MG: 75 TABLET ORAL at 08:40

## 2021-06-13 RX ADMIN — INSULIN LISPRO 8 UNITS: 100 INJECTION, SOLUTION INTRAVENOUS; SUBCUTANEOUS at 12:37

## 2021-06-13 RX ADMIN — Medication 600 MG: at 19:28

## 2021-06-13 RX ADMIN — LEVETIRACETAM 500 MG: 500 TABLET ORAL at 19:28

## 2021-06-13 RX ADMIN — INSULIN LISPRO 6 UNITS: 100 INJECTION, SOLUTION INTRAVENOUS; SUBCUTANEOUS at 08:40

## 2021-06-13 RX ADMIN — LEVETIRACETAM 500 MG: 500 TABLET ORAL at 08:39

## 2021-06-13 RX ADMIN — ESCITALOPRAM OXALATE 10 MG: 10 TABLET ORAL at 08:40

## 2021-06-13 RX ADMIN — ASPIRIN 81 MG: 81 TABLET, COATED ORAL at 08:40

## 2021-06-13 RX ADMIN — LOSARTAN POTASSIUM 50 MG: 50 TABLET, FILM COATED ORAL at 17:40

## 2021-06-13 RX ADMIN — DOCUSATE SODIUM 100 MG: 100 CAPSULE, LIQUID FILLED ORAL at 08:40

## 2021-06-13 RX ADMIN — INSULIN GLARGINE 12 UNITS: 100 INJECTION, SOLUTION SUBCUTANEOUS at 17:40

## 2021-06-13 RX ADMIN — LOSARTAN POTASSIUM 50 MG: 50 TABLET, FILM COATED ORAL at 08:40

## 2021-06-13 RX ADMIN — INSULIN LISPRO 6 UNITS: 100 INJECTION, SOLUTION INTRAVENOUS; SUBCUTANEOUS at 17:40

## 2021-06-13 RX ADMIN — Medication 10 ML: at 08:40

## 2021-06-13 RX ADMIN — HEPARIN SODIUM 5000 UNITS: 5000 INJECTION INTRAVENOUS; SUBCUTANEOUS at 08:39

## 2021-06-13 RX ADMIN — INSULIN LISPRO 6 UNITS: 100 INJECTION, SOLUTION INTRAVENOUS; SUBCUTANEOUS at 12:37

## 2021-06-13 RX ADMIN — POVIDONE-IODINE: 10 SOLUTION TOPICAL at 08:41

## 2021-06-13 RX ADMIN — Medication 600 MG: at 08:40

## 2021-06-13 RX ADMIN — TAMSULOSIN HYDROCHLORIDE 0.4 MG: 0.4 CAPSULE ORAL at 19:28

## 2021-06-13 RX ADMIN — HYDRALAZINE HYDROCHLORIDE 50 MG: 25 TABLET, FILM COATED ORAL at 08:40

## 2021-06-13 NOTE — DISCHARGE SUMMARY
[facility]  DISCHARGE SUMMARY        Prepared For PCP:  Marizol Puentes MD    Patient Name: Isaias Molina  : 1955  MRN: [id number]      Date of Admission:   2021    Date of Discharge:  2021    Length of stay:  LOS: 9 days     Hospital Course     Presenting Problem:   Hypertensive emergency [I16.1]      Active Hospital Problems    Diagnosis  POA   • Non-ST elevation MI (NSTEMI) (CMS/[facility]) [I21.4]  Yes     Priority: High   • Coronary artery disease [I25.10]  Yes     Priority: High   • Chronic kidney disease, stage 3b (CMS/[facility]) [N18.32]  Yes   • Acute kidney injury superimposed on chronic kidney disease (CMS/[facility]) [N17.9, N18.9]  Yes   • Abnormal nuclear stress test [R94.39]  Yes   • Diabetic retinopathy (CMS/[facility]) [E11.319]  Yes   • Seizure as late effect of cerebrovascular accident (CVA) (CMS/[facility]) [I69.398, R56.9]  Not Applicable   • Acquired hypothyroidism [E03.9]  Yes   • Mixed hyperlipidemia [E78.2]  Yes   • Obesity [E66.9]  Yes   • Type 2 diabetes mellitus (CMS/[facility]) [E11.9]  Yes   • Benign essential hypertension [I10]  Yes   • Vitamin B12 deficiency [E53.8]  Yes   • Cerebrovascular accident (CVA) (CMS/[facility]) [I63.9]  Yes   • Homonymous hemianopia [H53.469]  Yes      Resolved Hospital Problems    Diagnosis Date Resolved POA   • **Hypertensive emergency [I16.1] 2021 Yes     Priority: High   • Elevated troponin level [R77.8] 2021 Yes     Coronary artery disease status post PCI and stents  -Cath performed 2021 showed multivessel coronary artery disease  -CV surgery consulted and patient is not felt to be a candidate for surgery  -Dr. Robles cardiology performed PCI and stent to the LAD and balloon angioplasty of the diagonal branch on 2021  - Continue aspirin and Plavix     Hypertensive emergency, resolved  Essential HTN, chronic and controlled  - patient reports noncompliance with home meds due to reported hypotension   -  continue amlodipine, metoprolol and hydralazine  -Benazepril and HCTZ held due to DYLON  -Nephrology consulted and added losartan 6/7     Acute Kidney Injury due to prerenal azotemia from medication (ACE inhibitor and HCTZ)  CKD stage 3b due to hypertension  - baseline Cr 1.3-1.6  - Cr trending down with IV fluids   - Avoid nephrotoxic medications  - nephrology consulting     Leukocytosis, likely reactive secondary to above     Uncontrolled IDDM with hyperglycemia and retinopathy  - Hold Metformin  - Hgb a1c 6.4  - continue basal and bolus insulin, dosage increased 6/12/2021     Hyperlipidemia  - continue statin  - HDL 22, LDL 92, Triglycerides 308      Hypothyroidism  - pt states he stopped taking thyroid hormone  -Current levels normal: TSH 3.74, free T4 0.98  -No need to resume thyroid replacement therapy     Hx of CVA with with residual vision loss  -CT head negative for evidence of acute intracranial injury;  encephalomalacia in the right occipital and temporal  lobes consistent with old infarct  -Continue aspirin, Plavix and atorvastatin     Seizure disorder   - continue Keppra  - pt reports he may have had a seizure prior to admission, no seizure activity since admission      Depression, chronic and uncontrolled  -Patient reportedly stopped taking home medications 2 months ago   (pt reports lexapro was working for the last 3 years, but effectiveness had declined prior to stopping it 2 months ago)  -Resume Lexapro     BPH with bladder outlet obstruction  -Patient had discontinued flomax due to lightheadedness  - resume Flomax    Medical noncompliance  -Compliance with medical care discussed with the patient    Hospital Course:  Isaias Molina is a 65 y.o. male who presented to [facility] from the ED at [facility] in [place]. He reports that he was having uncontrolled blood pressure at home.      Patient has a history of coronary artery disease with previous PCI to the left circumflex and  RCA in 2011.     The patient reports in the last month he has stopped many of his medications including antihypertensive agents, his statin and Plavix.     He reports that he was having periods of lightheadedness and dizziness and thought his blood pressure was running low so he stopped these medications.     He reports with Covid he has not been out to the doctor and missed several follow-ups.     He denies any exertional chest pain or dyspnea but reports yesterday he was feeling unwell which prompted his visit to the emergency room.     Yesterday he was unaware of having a seizure. However this morning he reports he thinks he had a seizure yesterday because his muscles feel sore and tight and he bit his tongue.     He denies any current chest pain  In the emergency room at [facility] it was reported that his troponin was 1.0.        6/5- Patient denies any further chest pain. Reports he thinks he might have had a seizure yesterday due to muscle cramping today.      6/6- Patient denies any complaint.     6/7- Patient reports no specific complaints at this time.  He had multiple questions related to his current meds and plan of treatment and all questions were answered.     6/8- Patient pt reports uncontrolled symptoms of depression (the bedside nurse also alerted me to this concern).  The patient reports lexapro was working for the last 3 years, but effectiveness had declined prior to stopping it 2 months ago.  He is willing to try it again.     6/9- Patient reports no complaints. He has multiple concerns and was extensively counseled.     6/10-patient was seen prior to heart cath and he denies current complaints.  He was counseled and all questions were answered.     6/11-patient was advised by the surgeon that he is not a surgical candidate.  He is awaiting cardiac cath with stents.  He denies current complaints.  He was counseled and all questions were answered.     6/12-patient reports doing well after his  heart cath yesterday evening.  He denies current complaints.     6/13-patient denies any current complaints.  No shortness of breath, chest pain, groin pain or other problems.  Nephrology and cardiology follow the patient during the hospital stay.  The patient should continue dual antiplatelet therapy.  Need for compliance with medical therapy discussed with the patient.  Renal function is stable.  Losartan was increased because of uncontrolled hypertension.  The patient is now felt to be stable for discharge.      Day of Discharge     Vital Signs:   Temp:  [98 °F (36.7 °C)-98.1 °F (36.7 °C)] 98 °F (36.7 °C)  Heart Rate:  [57-69] 69  Resp:  [14-18] 18  BP: (123-168)/(64-89) 123/64     Physical Exam:  Physical Exam  Vital signs and nurses notes reviewed.  Well-developed well-nourished in no acute distress sitting up in bed awake and alert; mucous membranes moist; sclerae anicteric; neck supple; lungs clear to auscultation bilaterally; CV regular rate and rhythm; abdomen soft nontender nondistended with active bowel sounds; extremities with no edema, cyanosis or calf tenderness; palpable pedal pulses bilaterally; right groin soft with no ecchymosis; no Flowers catheter.      Pertinent  and/or Most Recent Results     Results from last 7 days   Lab Units 06/13/21  0743 06/12/21  0720 06/12/21  0212 06/11/21  0917 06/11/21  0755 06/10/21  0228 06/09/21  0640 06/09/21  0246 06/08/21  1501 06/08/21  0250   WBC 10*3/mm3  --   --  14.30*  --  10.40 12.20* 14.40*  --  10.10 11.20*   HEMOGLOBIN g/dL  --   --  13.0  --  12.5* 12.4* 13.0  --  12.5* 12.9*   HEMATOCRIT %  --   --  37.8  --  35.9* 35.4* 37.0*  --  36.5* 37.4*   PLATELETS 10*3/mm3  --   --  206  --  214 241 230  --  222 190   SODIUM mmol/L 136 136  --  137  --  133*  --  136 136 136   POTASSIUM mmol/L 4.4 4.7  --  4.5  --  5.0  --  4.7 4.5 4.2   CHLORIDE mmol/L 107 106  --  106  --  104  --  103 106 106   CO2 mmol/L 20.0* 20.0*  --  21.0*  --  20.0*  --  22.0 22.0  18.0*   BUN mg/dL 35* 36*  --  34*  --  35*  --  25* 22 22   CREATININE mg/dL 1.77* 1.82*  --  1.82*  --  2.22*  --  1.97* 1.76* 1.66*   GLUCOSE mg/dL 116* 230*  --  123*  --  174*  --  92 143* 98   CALCIUM mg/dL 8.2* 8.3*  --  8.1*  --  8.4*  --  8.5* 8.2* 8.3*     Results from last 7 days   Lab Units 06/11/21  0917 06/09/21  0640   BILIRUBIN mg/dL 0.3  --    ALK PHOS U/L 100  --    ALT (SGPT) U/L 21  --    AST (SGOT) U/L 17  --    PROTIME Seconds  --  10.7   INR   --  0.97   APTT seconds  --  26.2           Invalid input(s): TG, LDLCALC, LDLREALC        Brief Urine Lab Results  (Last result in the past 365 days)      Color   Clarity   Blood   Leuk Est   Nitrite   Protein   CREAT   Urine HCG        06/05/21 1359             227.3       06/05/21 1359 Yellow Cloudy  Comment:  Result checked  Moderate (2+) Negative Negative >=300 mg/dL (3+)               Microbiology Results Abnormal     Procedure Component Value - Date/Time    COVID PRE-OP / PRE-PROCEDURE SCREENING ORDER (NO ISOLATION) - Swab, Nasopharynx [[id number]]  (Normal) Collected: 06/07/21 0633    Lab Status: Final result Specimen: Swab from Nasopharynx Updated: 06/07/21 0731    Narrative:      The following orders were created for panel order COVID PRE-OP / PRE-PROCEDURE SCREENING ORDER (NO ISOLATION) - Swab, Nasopharynx.  Procedure                               Abnormality         Status                     ---------                               -----------         ------                     COVID-19,CEPHEID,COR/ERVIN...[[id number]]  Normal              Final result                 Please view results for these tests on the individual orders.    COVID-19,CEPHEID,COR/ERVIN/PAD/[facility] IN-HOUSE(OR EMERGENT/ADD-ON),NP SWAB IN TRANSPORT MEDIA 3-4 HR TAT, RT-PCR - Swab, Nasopharynx [[id number]]  (Normal) Collected: 06/07/21 0633    Lab Status: Final result Specimen: Swab from Nasopharynx Updated: 06/07/21 0731     COVID19 Not Detected    Narrative:      Fact sheet for  providers: https://www.fda.gov/media/708152/download     Fact sheet for patients: https://www.fda.gov/media/156746/download  Fact sheet for providers: https://www.fda.gov/media/378764/download     Fact sheet for patients: https://www.fda.gov/media/962735/download          US Renal Bilateral    Result Date: 6/5/2021  Impression: 1. No evidence of acute urinary obstruction. 2. Enlarged prostate. Electronically signed by:  Jesu Spicer M.D.  6/5/2021 6:34 PM      Results for orders placed during the hospital encounter of 06/04/21    Duplex Vein Mapping Lower Extremity - Bilateral CAR    Interpretation Summary  · Right greater saphenous vein above knee: with adequate size. • Right greater saphenous vein below knee: with adequate size. • Left greater saphenous vein above knee: with inadequate size. • Left greater saphenous vein below knee: with inadequate size.      Results for orders placed during the hospital encounter of 06/04/21    Duplex Vein Mapping Lower Extremity - Bilateral CAR    Interpretation Summary  · Right greater saphenous vein above knee: with adequate size. • Right greater saphenous vein below knee: with adequate size. • Left greater saphenous vein above knee: with inadequate size. • Left greater saphenous vein below knee: with inadequate size.      Results for orders placed during the hospital encounter of 06/04/21    Adult Transthoracic Echo Complete W/ Cont if Necessary Per Protocol    Interpretation Summary  · Left ventricular ejection fraction appears to be 61 - 65%.  · No pericardial effusion noted              Test Results Pending at Discharge        Procedures Performed  Procedure(s):  Percutaneous Coronary Intervention  Stent TEJAL coronary         Consults:   Consults     Date and Time Order Name Status Description    6/9/2021 12:20 PM Inpatient Cardiothoracic Surgery Consult Completed     6/5/2021  9:43 AM Inpatient Nephrology Consult      6/4/2021 11:11 PM Inpatient Cardiology Consult  Completed             Discharge Details        Discharge Medications      New Medications      Instructions Start Date   acetaminophen 325 MG tablet  Commonly known as: TYLENOL   650 mg, Oral, Every 4 Hours PRN      hydrALAZINE 50 MG tablet  Commonly known as: APRESOLINE   50 mg, Oral, 2 Times Daily      insulin glargine 100 UNIT/ML injection  Commonly known as: LANTUS, SEMGLEE   12 Units, Subcutaneous, Daily With Dinner      insulin lispro 100 UNIT/ML injection  Commonly known as: ADMELOG   6 Units, Subcutaneous, 3 Times Daily With Meals      losartan 100 MG tablet  Commonly known as: COZAAR   100 mg, Oral, Every 24 Hours Scheduled   Start Date: June 14, 2021     metoprolol succinate XL 50 MG 24 hr tablet  Commonly known as: TOPROL-XL   50 mg, Oral, Every 24 Hours Scheduled   Start Date: June 14, 2021     povidone-iodine 10 % external solution  Commonly known as: BETADINE   Topical, Daily, Do not get Betadine on surrounding normal skin   Start Date: June 14, 2021        Changes to Medications      Instructions Start Date   amLODIPine 10 MG tablet  Commonly known as: NORVASC  What changed:   · medication strength  · how much to take   10 mg, Oral, Daily   Start Date: June 14, 2021     B-12 Compliance Injection 1000 MCG/ML kit  Generic drug: Cyanocobalamin  What changed: See the new instructions.   1,000 mcg, Subcutaneous, Every 30 Days         Continue These Medications      Instructions Start Date   accu-chek soft touch lancets   Use with device and strips to check blood glucose      aspirin 81 MG EC tablet   81 mg, Oral, Daily      clopidogrel 75 MG tablet  Commonly known as: PLAVIX   75 mg, Oral, Daily      docusate sodium 100 MG capsule  Commonly known as: Colace   100 mg, Oral, Daily      escitalopram 10 MG tablet  Commonly known as: LEXAPRO   TAKE ONE TABLET BY MOUTH EVERY DAY      glucose blood test strip  Commonly known as: Accu-Chek Latonia   1 each, Other, Daily, Use as instructed      levETIRAcetam 500 MG  "tablet  Commonly known as: Keppra   500 mg, Oral, 2 Times Daily      levothyroxine 25 MCG tablet  Commonly known as: Synthroid   25 mcg, Oral, Daily      pravastatin 40 MG tablet  Commonly known as: Pravachol   40 mg, Oral, Daily      tamsulosin 0.4 MG capsule 24 hr capsule  Commonly known as: FLOMAX   TAKE ONE CAPSULE BY MOUTH TWICE DAILY      UltiCare Insulin Syringe 31G X 5/16\" 0.5 ML misc  Generic drug: Insulin Syringe-Needle U-100   ULTICARE INSULIN SYRINGE 31G X 5/16\" 0.5 ML         Stop These Medications    benazepril 40 MG tablet  Commonly known as: LOTENSIN     hydroCHLOROthiazide 25 MG tablet  Commonly known as: HYDRODIURIL     ketoconazole 2 % cream  Commonly known as: NIZORAL     metFORMIN 500 MG tablet  Commonly known as: GLUCOPHAGE     NovoLIN 70/30 (70-30) 100 UNIT/ML injection  Generic drug: insulin NPH-insulin regular            Allergies   Allergen Reactions   • Shellfish Allergy Nausea And Vomiting         Discharge Disposition:  Rehab Facility or Unit (DC - External)    Diet:  Hospital:  Diet Order   Procedures   • Diet Diabetic/Consistent Carbs; Diabetic - Consistent Carb         Discharge Activity:   Activity Instructions     Activity as Tolerated              CODE STATUS:    Code Status and Medical Interventions:   Ordered at: 06/04/21 1154     Code Status:    CPR     Medical Interventions (Level of Support Prior to Arrest):    Full         Follow-up Appointments  No future appointments.    Additional Instructions for the Follow-ups that You Need to Schedule     Ambulatory Referral to Home Health   As directed      Face to Face Visit Date: 6/8/2021    Follow-up provider for Plan of Care?: I treated the patient in an acute care facility and will not continue treatment after discharge.    Follow-up provider: JUAN ALVAREZ [[id number]]    Reason/Clinical Findings: Post-hospital evaluation    Describe mobility limitations that make leaving home difficult: Tires easily    Nursing/Therapeutic " Services Requested: Other (HH to evaluate)    Frequency: 1 Week 1         Call MD With Problems / Concerns   As directed      Instructions: Call [phone] or email hospital[email or web address] for problems or concerns.    Order Comments: Instructions: Call [phone] or email [email or web address] for problems or concerns.          Discharge Follow-up with PCP   As directed       Currently Documented PCP:    Marziol Puentes MD    PCP Phone Number:    [phone]     Follow Up Details: After released from rehab                 Condition on Discharge:      Stable      This patient has been examined wearing appropriate Personal Protective Equipment. 06/13/21      Electronically signed by Caty Marroquin MD, 06/13/21, 3:06 PM EDT.      Time: I spent  35  minutes on this discharge activity which included face-to-face encounter with the patient/reviewing the data in the system/coordination of the care with the nursing staff as well as consultants/documentation/entering orders.

## 2021-06-13 NOTE — PROGRESS NOTES
CARDIOLOGY FOLLOW-UP PROGRESS NOTE      Reason for follow-up:    Elevated troponin    -1.0 at outlying facility, 0.233 here  CAD with prior PCI to LCx, RCA 2011  Uncontrolled HTN:  Pt has stopped all antihypertensive agents for last month  DM  Dyslipidemia  Hx Seizure disorder with possible Seizure  Non compliance with medical therapy     Attending: Caty Marroquin MD      SUBJECTIVE:    No chest pain or shortness of breath.  Lying in bed comfortably     Review of Systems   General: denies fever, chills, anorexia, weight loss  Eyes: denies blurring, diplopia  Ear/Nose/Throat: denies ear pain, nosebleeds, hoarseness  Cardiovascular: See HPI  Respiratory: denies excessive sputum, hemoptysis, wheezing  Gastrointestinal: denies nausea, vomiting, change in bowel habits, abdominal pain  Genitourinary: denies dysuria and hematuria  Musculoskeletal: denies back pain, joint pain, joint swelling, muscle cramps, weakness  Skin: denies rashes, itching, suspicious lesions  Neurologic: denies focal neuro deficits  Psychiatric: denies depression, anxiety  Endocrine: denies cold intolerance, heat intolerance  Hematologic/Lymphatic: denies abnormal bruising, bleeding  Allergic/Immunologic: denies urticaria or persistent infections      Allergies: Shellfish allergy    Scheduled Meds:Acetylcysteine, 600 mg, Oral, BID  amLODIPine, 10 mg, Oral, Daily  aspirin, 81 mg, Oral, Daily  atorvastatin, 40 mg, Oral, Daily  clopidogrel, 75 mg, Oral, Daily  docusate sodium, 100 mg, Oral, Daily  escitalopram, 10 mg, Oral, Daily  heparin (porcine), 5,000 Units, Subcutaneous, Q12H  hydrALAZINE, 50 mg, Oral, BID  insulin glargine, 12 Units, Subcutaneous, Daily With Dinner  insulin lispro, 0-14 Units, Subcutaneous, TID AC  insulin lispro, 6 Units, Subcutaneous, TID With Meals  levETIRAcetam, 500 mg, Oral, BID  levothyroxine, 25 mcg, Oral, Q AM  [START ON 6/14/2021] losartan, 100 mg, Oral, Q24H  metoprolol succinate XL, 50 mg, Oral, Q24H  povidone-iodine,  , Topical, Daily  sodium chloride, 10 mL, Intravenous, Q12H  tamsulosin, 0.4 mg, Oral, Nightly        Continuous Infusions:sodium chloride, 1-3 mL/kg/hr        PRN Meds:.•  acetaminophen  •  acetaminophen  •  atropine  •  dextrose  •  dextrose  •  glucagon (human recombinant)  •  hydrALAZINE  •  insulin lispro **AND** insulin lispro  •  ondansetron  •  sodium chloride  •  sodium chloride  •  sodium chloride    Objective     Vital Signs  Vitals:    06/13/21 0534 06/13/21 1029 06/13/21 1456 06/13/21 1824   BP: 168/89 157/81 123/64 168/79   BP Location: Right arm      Patient Position: Lying      Pulse:  62 69 65   Resp: 18 14 18 18   Temp:  98 °F (36.7 °C) 98 °F (36.7 °C) 98.1 °F (36.7 °C)   TempSrc: Oral      SpO2: 99% 98% 94% 97%   Weight:       Height:         Wt Readings from Last 1 Encounters:   06/12/21 107 kg (236 lb 15.9 oz)       Physical Exam:     General Appearance:    Alert, oriented, no acute distress   Neck:  Supple without JVD or bruit   Lungs:    Grossly clear with adequate airflow bilaterally    Heart:    Regular rate and rhythm, normal S1 and S2, no            murmur, no gallop, no rub, no click   Chest Wall/Thorax:    No abnormalities observed   Abdomen:     Normal bowel sounds, no masses, no organomegaly, soft        non-tender, non-distended, no guarding, no rebound                tenderness   Extremities:  Range of motion adequate, no edema, no cyanosis, no             redness   Pulses:   Pulses palpable and equal bilaterally   Skin:   No bleeding, bruising or rash   Neurologic:  No focal deficits               Results Review:     CBC    Results from last 7 days   Lab Units 06/12/21  0212 06/11/21  0755 06/10/21  0228 06/09/21  0640 06/08/21  1501 06/08/21  0250   WBC 10*3/mm3 14.30* 10.40 12.20* 14.40* 10.10 11.20*   HEMOGLOBIN g/dL 13.0 12.5* 12.4* 13.0 12.5* 12.9*   PLATELETS 10*3/mm3 206 214 241 230 222 190     BMP   Results from last 7 days   Lab Units 06/13/21  0743 06/12/21  0720  06/11/21  0917 06/10/21  0228 06/09/21  0246 06/08/21  1501 06/08/21  0250   SODIUM mmol/L 136 136 137 133* 136 136 136   POTASSIUM mmol/L 4.4 4.7 4.5 5.0 4.7 4.5 4.2   CHLORIDE mmol/L 107 106 106 104 103 106 106   CO2 mmol/L 20.0* 20.0* 21.0* 20.0* 22.0 22.0 18.0*   BUN mg/dL 35* 36* 34* 35* 25* 22 22   CREATININE mg/dL 1.77* 1.82* 1.82* 2.22* 1.97* 1.76* 1.66*   GLUCOSE mg/dL 116* 230* 123* 174* 92 143* 98   MAGNESIUM mg/dL  --   --   --   --  1.7  --   --    PHOSPHORUS mg/dL  --  4.2  --  3.5 4.1  --  3.8     HbA1C   Lab Results   Component Value Date    HGBA1C 6.4 (H) 06/05/2021    HGBA1C 7.1 (H) 12/31/2019    HGBA1C 7.6 (H) 07/08/2019     CMP   Results from last 7 days   Lab Units 06/13/21  0743 06/12/21  0720 06/11/21  0917 06/10/21  0228 06/09/21  0246 06/08/21  1501 06/08/21  0250   SODIUM mmol/L 136 136 137 133* 136 136 136   POTASSIUM mmol/L 4.4 4.7 4.5 5.0 4.7 4.5 4.2   CHLORIDE mmol/L 107 106 106 104 103 106 106   CO2 mmol/L 20.0* 20.0* 21.0* 20.0* 22.0 22.0 18.0*   BUN mg/dL 35* 36* 34* 35* 25* 22 22   CREATININE mg/dL 1.77* 1.82* 1.82* 2.22* 1.97* 1.76* 1.66*   GLUCOSE mg/dL 116* 230* 123* 174* 92 143* 98   ALBUMIN g/dL  --  3.10* 2.90* 3.10* 2.80*  --  2.70*   BILIRUBIN mg/dL  --   --  0.3  --   --   --   --    ALK PHOS U/L  --   --  100  --   --   --   --    AST (SGOT) U/L  --   --  17  --   --   --   --    ALT (SGPT) U/L  --   --  21  --   --   --   --      Radiology(recent) No radiology results for the last day    Cardiac Studies:  No new studies; telemetry shows sinus rhythm    Medication Review:   Scheduled Meds:Acetylcysteine, 600 mg, Oral, BID  amLODIPine, 10 mg, Oral, Daily  aspirin, 81 mg, Oral, Daily  atorvastatin, 40 mg, Oral, Daily  clopidogrel, 75 mg, Oral, Daily  docusate sodium, 100 mg, Oral, Daily  escitalopram, 10 mg, Oral, Daily  heparin (porcine), 5,000 Units, Subcutaneous, Q12H  hydrALAZINE, 50 mg, Oral, BID  insulin glargine, 12 Units, Subcutaneous, Daily With Dinner  insulin  lispro, 0-14 Units, Subcutaneous, TID AC  insulin lispro, 6 Units, Subcutaneous, TID With Meals  levETIRAcetam, 500 mg, Oral, BID  levothyroxine, 25 mcg, Oral, Q AM  [START ON 6/14/2021] losartan, 100 mg, Oral, Q24H  metoprolol succinate XL, 50 mg, Oral, Q24H  povidone-iodine, , Topical, Daily  sodium chloride, 10 mL, Intravenous, Q12H  tamsulosin, 0.4 mg, Oral, Nightly      Continuous Infusions:sodium chloride, 1-3 mL/kg/hr      PRN Meds:.•  acetaminophen  •  acetaminophen  •  atropine  •  dextrose  •  dextrose  •  glucagon (human recombinant)  •  hydrALAZINE  •  insulin lispro **AND** insulin lispro  •  ondansetron  •  sodium chloride  •  sodium chloride  •  sodium chloride    Assessment:  levated troponin    -1.0 at outlying facility, 0.233 here  CAD with prior PCI to LCx, RCA 2011  Uncontrolled HTN:  Pt has stopped all antihypertensive agents for last month  DM  Dyslipidemia  Hx Seizure disorder with possible Seizure  Non compliance with medical therapy  Combined hyperlipidemia      Plan:  Patient presented after a seizure  Patient did not have any chest pain but had elevated troponin consistent with is a non-STEMI or versus renal insufficiency  Patient had previous stent placement to the circumflex artery and RCA  Patient blood pressure very high and his medications have been adjusted  Patient sugar levels and lipid levels are followed by the primary care doctor  Patient is noncompliant with medications also.  Patient had a stress test which was abnormal  Patient had a cardiac catheterization which showed severe three-vessel coronary artery disease  Patient's films are reviewed by surgeons.  Patient is felt not to be a candidate for surgery  Discussed with patient and I will discuss with his son also about performing stents to probably 1 or 2 arteries  Discussed with patient about the risks and benefits including the risk of heart attack stroke and death and arrhythmias during the procedure.    Patient had  renal insufficiency and was cleared by the nephrologist for the procedure and renal function will be followed.  Immanuel Field MD  06/13/21  19:25 EDT      This report was generated using the Dragon voice recognition system.  Patient denies any new cardiac symptoms  Denies any chest pain  Need for compliance with medical therapy reviewed and discussed with patient  Status post PCI and stenting of the LAD and also balloon angioplasty of the diagonal branch  Findings reviewed and discussed the patient  Nephrology following for renal failure  Renal function is stable for now  Labs and medications reviewed  Continue dual antiplatelet therapy with aspirin and Plavix  Discussed with patient at bedside    Renal function is stable  Increase the dose of losartan for better control of blood pressure  Need for close monitoring and follow-up reviewed and discussed with patient  Likely discharge home soon

## 2021-06-13 NOTE — PROGRESS NOTES
"   LOS: 9 days    Patient Care Team:  Marizol Puentes MD as PCP - General (Family Medicine)      Subjective     Patient resting comfortably.    Denies any chest pain, shortness of breath, nausea or vomiting    Objective     Vital Sign Min/Max for last 24 hours  Temp:  [98 °F (36.7 °C)-98.1 °F (36.7 °C)] 98 °F (36.7 °C)  Heart Rate:  [57-67] 62  Resp:  [14-18] 14  BP: (146-168)/(73-89) 157/81                       Flowsheet Rows      First Filed Value   Admission Height  175.3 cm (69\") Documented at 06/04/2021 2120   Admission Weight  107 kg (234 lb 12.6 oz) Documented at 06/04/2021 2120          I/O this shift:  In: -   Out: 450 [Urine:450]  I/O last 3 completed shifts:  In: 1120 [P.O.:1120]  Out: 1700 [Urine:1700]    Physical Exam:  Physical Exam    General.  Awake, alert  Head.  Atraumatic, normocephalic  Neck.  Supple.  No JVD  Respiratory.  Clear to auscultation bilaterally.  No wheezing or rhonchi  Cardiovascular.  Normal rate.  Regular rhythm.  S1, S2  Abdomen.  Soft, obese, nontender.  No organomegaly.  Bowel sounds present  Extremities.  No edema.  Pulses palpable  Neurological.  No focal deficit       LABS:  Lab Results   Component Value Date    CALCIUM 8.2 (L) 06/13/2021    PHOS 4.2 06/12/2021     Results from last 7 days   Lab Units 06/13/21  0743 06/12/21  0720 06/12/21  0212 06/11/21  0917 06/11/21  0755 06/10/21  0228 06/09/21  0246   MAGNESIUM mg/dL  --   --   --   --   --   --  1.7   SODIUM mmol/L 136 136  --  137  --  133* 136   POTASSIUM mmol/L 4.4 4.7  --  4.5  --  5.0 4.7   CHLORIDE mmol/L 107 106  --  106  --  104 103   CO2 mmol/L 20.0* 20.0*  --  21.0*  --  20.0* 22.0   BUN mg/dL 35* 36*  --  34*  --  35* 25*   CREATININE mg/dL 1.77* 1.82*  --  1.82*  --  2.22* 1.97*   GLUCOSE mg/dL 116* 230*  --  123*  --  174* 92   CALCIUM mg/dL 8.2* 8.3*  --  8.1*  --  8.4* 8.5*   WBC 10*3/mm3  --   --  14.30*  --  10.40 12.20*  --    HEMOGLOBIN g/dL  --   --  13.0  --  12.5* 12.4*  --    PLATELETS " 10*3/mm3  --   --  206  --  214 241  --    ALT (SGPT) U/L  --   --   --  21  --   --   --    AST (SGOT) U/L  --   --   --  17  --   --   --      Lab Results   Component Value Date    CKTOTAL 253 08/23/2018    TROPONINT 0.366 (C) 06/05/2021     Estimated Creatinine Clearance: 50.4 mL/min (A) (by C-G formula based on SCr of 1.77 mg/dL (H)).      Brief Urine Lab Results  (Last result in the past 365 days)      Color   Clarity   Blood   Leuk Est   Nitrite   Protein   CREAT   Urine HCG        06/05/21 1359             227.3       06/05/21 1359 Yellow Cloudy  Comment:  Result checked  Moderate (2+) Negative Negative >=300 mg/dL (3+)             WEIGHTS:     Wt Readings from Last 1 Encounters:   06/12/21 0536 107 kg (236 lb 15.9 oz)   06/10/21 0453 107 kg (236 lb 8.9 oz)   06/09/21 1502 107 kg (236 lb)   06/09/21 0516 107 kg (236 lb 1.8 oz)   06/08/21 0559 107 kg (235 lb 7.2 oz)   06/07/21 0500 110 kg (242 lb 8.1 oz)   06/06/21 0553 107 kg (235 lb 10.8 oz)   06/04/21 2120 107 kg (234 lb 12.6 oz)       Acetylcysteine, 600 mg, Oral, BID  amLODIPine, 10 mg, Oral, Daily  aspirin, 81 mg, Oral, Daily  atorvastatin, 40 mg, Oral, Daily  clopidogrel, 75 mg, Oral, Daily  docusate sodium, 100 mg, Oral, Daily  escitalopram, 10 mg, Oral, Daily  heparin (porcine), 5,000 Units, Subcutaneous, Q12H  hydrALAZINE, 50 mg, Oral, BID  insulin glargine, 12 Units, Subcutaneous, Daily With Dinner  insulin lispro, 0-14 Units, Subcutaneous, TID AC  insulin lispro, 6 Units, Subcutaneous, TID With Meals  levETIRAcetam, 500 mg, Oral, BID  levothyroxine, 25 mcg, Oral, Q AM  losartan, 50 mg, Oral, Q24H  metoprolol succinate XL, 50 mg, Oral, Q24H  povidone-iodine, , Topical, Daily  sodium chloride, 10 mL, Intravenous, Q12H  tamsulosin, 0.4 mg, Oral, Nightly      sodium chloride, 1-3 mL/kg/hr        Assessment/Plan       1.Acute kidney injury on CKD 3  Patient's creatinine stable.  Patient has underlying chronic kidney disease with proteinuria due to  diabetic glomerulosclerosis  S/p PCI .  Creatinine remained stable  off IV fluids    2.Hypertension  Blood pressure Acceptable  Continue current antihypertensives    3.  Proteinuria  Secondary to diabetic glomerulosclerosis  Continue ARB    4.Coronary artery disease  S/p PCI to mid LAD and diagonal branch of LAD  Cardiology following    Follow-up with me in 1 to 2 weeks on discharge      Sherman Will MD  06/13/21  11:15 EDT

## 2021-06-14 NOTE — CASE MANAGEMENT/SOCIAL WORK
Case Management Discharge Note      Final Note: milijoana hernan    Provided Post Acute Provider List?: Yes  Post Acute Provider List: Home Health  Provided Post Acute Provider Quality & Resource List?: Yes  Post Acute Provider Quality and Resource List: Home Health  Delivered To: Patient  Method of Delivery: In person    Selected Continued Care - Discharged on 6/13/2021 Admission date: 6/4/2021 - Discharge disposition: Rehab Facility or Unit (DC - External)    Destination Coordination complete    Service Provider Selected Services Address Phone Fax Patient Preferred    [facility] [address], [place] [id number] [phone]               Final Discharge Disposition Code: 03 - skilled nursing facility (SNF)

## 2021-07-15 ENCOUNTER — TELEPHONE (OUTPATIENT)
Dept: FAMILY MEDICINE CLINIC | Facility: CLINIC | Age: 66
End: 2021-07-15

## 2021-07-15 NOTE — TELEPHONE ENCOUNTER
JENNIFER WITH [facility] CALLED IN REGARDS TO A REQUISITION FORM   THAT WAS FAX ON 7/13/21  WANTS TO KNOW IF WAS RECEIVED     PLEASE CALL   [phone]

## 2021-07-19 NOTE — TELEPHONE ENCOUNTER
I spoke with Harshil. He voiced understanding and is refaxing over that requisition to my attention so I can watch for it.

## 2021-11-30 ENCOUNTER — LAB REQUISITION (OUTPATIENT)
Dept: LAB | Facility: HOSPITAL | Age: 66
End: 2021-11-30

## 2021-11-30 DIAGNOSIS — I11.0 HYPERTENSIVE HEART DISEASE WITH HEART FAILURE (HCC): ICD-10-CM

## 2021-11-30 DIAGNOSIS — R26.81 UNSTEADINESS ON FEET: ICD-10-CM

## 2021-11-30 DIAGNOSIS — R27.9 UNSPECIFIED LACK OF COORDINATION: ICD-10-CM

## 2021-11-30 PROCEDURE — U0004 COV-19 TEST NON-CDC HGH THRU: HCPCS | Performed by: INTERNAL MEDICINE

## 2021-11-30 PROCEDURE — U0005 INFEC AGEN DETEC AMPLI PROBE: HCPCS | Performed by: INTERNAL MEDICINE

## 2021-12-01 LAB — SARS-COV-2 ORF1AB RESP QL NAA+PROBE: NOT DETECTED

## 2024-02-13 ENCOUNTER — APPOINTMENT (OUTPATIENT)
Dept: CT IMAGING | Facility: HOSPITAL | Age: 69
DRG: 563 | End: 2024-02-13
Payer: MEDICARE

## 2024-02-13 ENCOUNTER — APPOINTMENT (OUTPATIENT)
Dept: GENERAL RADIOLOGY | Facility: HOSPITAL | Age: 69
DRG: 563 | End: 2024-02-13
Payer: MEDICARE

## 2024-02-13 ENCOUNTER — HOSPITAL ENCOUNTER (INPATIENT)
Facility: HOSPITAL | Age: 69
LOS: 4 days | Discharge: NURSING, REHAB OR LONG-TERM CARE | DRG: 563 | End: 2024-02-17
Attending: EMERGENCY MEDICINE | Admitting: STUDENT IN AN ORGANIZED HEALTH CARE EDUCATION/TRAINING PROGRAM
Payer: MEDICARE
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
## 2024-02-13 DIAGNOSIS — D72.829 LEUKOCYTOSIS, UNSPECIFIED TYPE: ICD-10-CM

## 2024-02-13 DIAGNOSIS — S42.295A OTHER CLOSED NONDISPLACED FRACTURE OF PROXIMAL END OF LEFT HUMERUS, INITIAL ENCOUNTER: Primary | ICD-10-CM

## 2024-02-13 PROBLEM — R26.81 UNSTEADY GAIT: Status: ACTIVE | Noted: 2017-12-04

## 2024-02-13 PROBLEM — S42.302A LEFT HUMERAL FRACTURE: Status: ACTIVE | Noted: 2024-02-13

## 2024-02-13 LAB
ANION GAP SERPL CALCULATED.3IONS-SCNC: 12 MMOL/L (ref 5–15)
ANISOCYTOSIS BLD QL: ABNORMAL
BASOPHILS # BLD MANUAL: 0.22 10*3/MM3 (ref 0–0.2)
BASOPHILS NFR BLD MANUAL: 1 % (ref 0–1.5)
BUN SERPL-MCNC: 38 MG/DL (ref 8–23)
BUN/CREAT SERPL: 14.3 (ref 7–25)
CALCIUM SPEC-SCNC: 9 MG/DL (ref 8.6–10.5)
CHLORIDE SERPL-SCNC: 109 MMOL/L (ref 98–107)
CK SERPL-CCNC: 77 U/L (ref 20–200)
CO2 SERPL-SCNC: 22 MMOL/L (ref 22–29)
CREAT SERPL-MCNC: 2.65 MG/DL (ref 0.76–1.27)
DEPRECATED RDW RBC AUTO: 44.2 FL (ref 37–54)
EGFRCR SERPLBLD CKD-EPI 2021: 25.5 ML/MIN/1.73
ERYTHROCYTE [DISTWIDTH] IN BLOOD BY AUTOMATED COUNT: 14.5 % (ref 12.3–15.4)
GLUCOSE BLDC GLUCOMTR-MCNC: 202 MG/DL (ref 70–105)
GLUCOSE SERPL-MCNC: 156 MG/DL (ref 65–99)
HCT VFR BLD AUTO: 38.4 % (ref 37.5–51)
HGB BLD-MCNC: 12.7 G/DL (ref 13–17.7)
HOLD SPECIMEN: NORMAL
LARGE PLATELETS: ABNORMAL
LYMPHOCYTES # BLD MANUAL: 4.66 10*3/MM3 (ref 0.7–3.1)
LYMPHOCYTES NFR BLD MANUAL: 2 % (ref 5–12)
MCH RBC QN AUTO: 28.9 PG (ref 26.6–33)
MCHC RBC AUTO-ENTMCNC: 33.2 G/DL (ref 31.5–35.7)
MCV RBC AUTO: 87.2 FL (ref 79–97)
MONOCYTES # BLD: 0.44 10*3/MM3 (ref 0.1–0.9)
NEUTROPHILS # BLD AUTO: 16.87 10*3/MM3 (ref 1.7–7)
NEUTROPHILS NFR BLD MANUAL: 70 % (ref 42.7–76)
NEUTS BAND NFR BLD MANUAL: 6 % (ref 0–5)
PLATELET # BLD AUTO: 253 10*3/MM3 (ref 140–450)
PMV BLD AUTO: 9.8 FL (ref 6–12)
POTASSIUM SERPL-SCNC: 5.5 MMOL/L (ref 3.5–5.2)
RBC # BLD AUTO: 4.4 10*6/MM3 (ref 4.14–5.8)
SCAN SLIDE: NORMAL
SODIUM SERPL-SCNC: 143 MMOL/L (ref 136–145)
VARIANT LYMPHS NFR BLD MANUAL: 21 % (ref 19.6–45.3)
WBC MORPH BLD: NORMAL
WBC NRBC COR # BLD AUTO: 22.2 10*3/MM3 (ref 3.4–10.8)
WHOLE BLOOD HOLD COAG: NORMAL

## 2024-02-13 PROCEDURE — 25810000003 SODIUM CHLORIDE 0.9 % SOLUTION

## 2024-02-13 PROCEDURE — 73030 X-RAY EXAM OF SHOULDER: CPT

## 2024-02-13 PROCEDURE — 80048 BASIC METABOLIC PNL TOTAL CA: CPT

## 2024-02-13 PROCEDURE — 70450 CT HEAD/BRAIN W/O DYE: CPT

## 2024-02-13 PROCEDURE — 82550 ASSAY OF CK (CPK): CPT

## 2024-02-13 PROCEDURE — 99285 EMERGENCY DEPT VISIT HI MDM: CPT

## 2024-02-13 PROCEDURE — 74176 CT ABD & PELVIS W/O CONTRAST: CPT

## 2024-02-13 PROCEDURE — 63710000001 INSULIN GLARGINE PER 5 UNITS: Performed by: STUDENT IN AN ORGANIZED HEALTH CARE EDUCATION/TRAINING PROGRAM

## 2024-02-13 PROCEDURE — 71045 X-RAY EXAM CHEST 1 VIEW: CPT

## 2024-02-13 PROCEDURE — 25010000002 CEFTRIAXONE PER 250 MG: Performed by: STUDENT IN AN ORGANIZED HEALTH CARE EDUCATION/TRAINING PROGRAM

## 2024-02-13 PROCEDURE — 36415 COLL VENOUS BLD VENIPUNCTURE: CPT

## 2024-02-13 PROCEDURE — 87040 BLOOD CULTURE FOR BACTERIA: CPT

## 2024-02-13 PROCEDURE — 25010000002 ENOXAPARIN PER 10 MG: Performed by: STUDENT IN AN ORGANIZED HEALTH CARE EDUCATION/TRAINING PROGRAM

## 2024-02-13 PROCEDURE — 82948 REAGENT STRIP/BLOOD GLUCOSE: CPT

## 2024-02-13 PROCEDURE — 85025 COMPLETE CBC W/AUTO DIFF WBC: CPT

## 2024-02-13 PROCEDURE — 96361 HYDRATE IV INFUSION ADD-ON: CPT

## 2024-02-13 PROCEDURE — 99284 EMERGENCY DEPT VISIT MOD MDM: CPT

## 2024-02-13 PROCEDURE — 25010000002 HYDROMORPHONE 1 MG/ML SOLUTION

## 2024-02-13 PROCEDURE — 25010000002 ONDANSETRON PER 1 MG

## 2024-02-13 PROCEDURE — 85007 BL SMEAR W/DIFF WBC COUNT: CPT

## 2024-02-13 PROCEDURE — 96372 THER/PROPH/DIAG INJ SC/IM: CPT

## 2024-02-13 PROCEDURE — 25810000003 LACTATED RINGERS PER 1000 ML: Performed by: STUDENT IN AN ORGANIZED HEALTH CARE EDUCATION/TRAINING PROGRAM

## 2024-02-13 PROCEDURE — 96375 TX/PRO/DX INJ NEW DRUG ADDON: CPT

## 2024-02-13 PROCEDURE — 63710000001 INSULIN LISPRO (HUMAN) PER 5 UNITS: Performed by: STUDENT IN AN ORGANIZED HEALTH CARE EDUCATION/TRAINING PROGRAM

## 2024-02-13 PROCEDURE — 25010000002 MORPHINE PER 10 MG: Performed by: STUDENT IN AN ORGANIZED HEALTH CARE EDUCATION/TRAINING PROGRAM

## 2024-02-13 PROCEDURE — 96376 TX/PRO/DX INJ SAME DRUG ADON: CPT

## 2024-02-13 PROCEDURE — 25010000002 MORPHINE PER 10 MG

## 2024-02-13 RX ORDER — AMOXICILLIN 250 MG
2 CAPSULE ORAL 2 TIMES DAILY PRN
Status: DISCONTINUED | OUTPATIENT
Start: 2024-02-13 | End: 2024-02-17 | Stop reason: HOSPADM

## 2024-02-13 RX ORDER — METOPROLOL SUCCINATE 50 MG/1
50 TABLET, EXTENDED RELEASE ORAL
Status: DISCONTINUED | OUTPATIENT
Start: 2024-02-14 | End: 2024-02-17 | Stop reason: HOSPADM

## 2024-02-13 RX ORDER — POLYETHYLENE GLYCOL 3350 17 G/17G
17 POWDER, FOR SOLUTION ORAL DAILY PRN
Status: DISCONTINUED | OUTPATIENT
Start: 2024-02-13 | End: 2024-02-17 | Stop reason: HOSPADM

## 2024-02-13 RX ORDER — ONDANSETRON 2 MG/ML
4 INJECTION INTRAMUSCULAR; INTRAVENOUS ONCE
Status: COMPLETED | OUTPATIENT
Start: 2024-02-13 | End: 2024-02-13

## 2024-02-13 RX ORDER — ATORVASTATIN CALCIUM 80 MG/1
80 TABLET, FILM COATED ORAL DAILY
COMMUNITY

## 2024-02-13 RX ORDER — SODIUM CHLORIDE 0.9 % (FLUSH) 0.9 %
10 SYRINGE (ML) INJECTION EVERY 12 HOURS SCHEDULED
Status: DISCONTINUED | OUTPATIENT
Start: 2024-02-13 | End: 2024-02-17 | Stop reason: HOSPADM

## 2024-02-13 RX ORDER — AMMONIUM LACTATE 12 G/100G
LOTION TOPICAL 2 TIMES DAILY PRN
COMMUNITY

## 2024-02-13 RX ORDER — AMLODIPINE BESYLATE 5 MG/1
10 TABLET ORAL DAILY
Status: DISCONTINUED | OUTPATIENT
Start: 2024-02-14 | End: 2024-02-17 | Stop reason: HOSPADM

## 2024-02-13 RX ORDER — ACETAMINOPHEN 325 MG/1
650 TABLET ORAL EVERY 4 HOURS PRN
Status: DISCONTINUED | OUTPATIENT
Start: 2024-02-13 | End: 2024-02-17 | Stop reason: HOSPADM

## 2024-02-13 RX ORDER — POLYETHYLENE GLYCOL 3350 17 G/17G
17 POWDER, FOR SOLUTION ORAL DAILY PRN
COMMUNITY

## 2024-02-13 RX ORDER — SODIUM CHLORIDE, SODIUM LACTATE, POTASSIUM CHLORIDE, CALCIUM CHLORIDE 600; 310; 30; 20 MG/100ML; MG/100ML; MG/100ML; MG/100ML
100 INJECTION, SOLUTION INTRAVENOUS CONTINUOUS
Status: DISCONTINUED | OUTPATIENT
Start: 2024-02-13 | End: 2024-02-16

## 2024-02-13 RX ORDER — CLONIDINE HYDROCHLORIDE 0.1 MG/1
0.2 TABLET ORAL 2 TIMES DAILY
Status: DISCONTINUED | OUTPATIENT
Start: 2024-02-13 | End: 2024-02-17 | Stop reason: HOSPADM

## 2024-02-13 RX ORDER — NICOTINE POLACRILEX 4 MG
15 LOZENGE BUCCAL
Status: DISCONTINUED | OUTPATIENT
Start: 2024-02-13 | End: 2024-02-17 | Stop reason: HOSPADM

## 2024-02-13 RX ORDER — TAMSULOSIN HYDROCHLORIDE 0.4 MG/1
0.4 CAPSULE ORAL 2 TIMES DAILY
Status: DISCONTINUED | OUTPATIENT
Start: 2024-02-13 | End: 2024-02-17 | Stop reason: HOSPADM

## 2024-02-13 RX ORDER — SODIUM CHLORIDE 9 MG/ML
40 INJECTION, SOLUTION INTRAVENOUS AS NEEDED
Status: DISCONTINUED | OUTPATIENT
Start: 2024-02-13 | End: 2024-02-17 | Stop reason: HOSPADM

## 2024-02-13 RX ORDER — NITROGLYCERIN 0.4 MG/1
0.4 TABLET SUBLINGUAL
COMMUNITY

## 2024-02-13 RX ORDER — TRAMADOL HYDROCHLORIDE 50 MG/1
25 TABLET ORAL EVERY 6 HOURS PRN
COMMUNITY

## 2024-02-13 RX ORDER — CLOPIDOGREL BISULFATE 75 MG/1
75 TABLET ORAL DAILY
Status: DISCONTINUED | OUTPATIENT
Start: 2024-02-14 | End: 2024-02-17 | Stop reason: HOSPADM

## 2024-02-13 RX ORDER — BISACODYL 5 MG/1
5 TABLET, DELAYED RELEASE ORAL DAILY PRN
Status: DISCONTINUED | OUTPATIENT
Start: 2024-02-13 | End: 2024-02-17 | Stop reason: HOSPADM

## 2024-02-13 RX ORDER — DEXTROSE MONOHYDRATE 25 G/50ML
25 INJECTION, SOLUTION INTRAVENOUS
Status: DISCONTINUED | OUTPATIENT
Start: 2024-02-13 | End: 2024-02-17 | Stop reason: HOSPADM

## 2024-02-13 RX ORDER — SODIUM CHLORIDE 0.9 % (FLUSH) 0.9 %
10 SYRINGE (ML) INJECTION AS NEEDED
Status: DISCONTINUED | OUTPATIENT
Start: 2024-02-13 | End: 2024-02-17 | Stop reason: HOSPADM

## 2024-02-13 RX ORDER — CLONIDINE HYDROCHLORIDE 0.2 MG/1
0.2 TABLET ORAL 2 TIMES DAILY
COMMUNITY

## 2024-02-13 RX ORDER — LEVOTHYROXINE SODIUM 0.03 MG/1
25 TABLET ORAL
Status: DISCONTINUED | OUTPATIENT
Start: 2024-02-14 | End: 2024-02-17 | Stop reason: HOSPADM

## 2024-02-13 RX ORDER — ESCITALOPRAM OXALATE 10 MG/1
10 TABLET ORAL DAILY
Status: DISCONTINUED | OUTPATIENT
Start: 2024-02-14 | End: 2024-02-17 | Stop reason: HOSPADM

## 2024-02-13 RX ORDER — ATORVASTATIN CALCIUM 40 MG/1
80 TABLET, FILM COATED ORAL DAILY
Status: DISCONTINUED | OUTPATIENT
Start: 2024-02-14 | End: 2024-02-17 | Stop reason: HOSPADM

## 2024-02-13 RX ORDER — IBUPROFEN 600 MG/1
1 TABLET ORAL
Status: DISCONTINUED | OUTPATIENT
Start: 2024-02-13 | End: 2024-02-17 | Stop reason: HOSPADM

## 2024-02-13 RX ORDER — ERGOCALCIFEROL 1.25 MG/1
50000 CAPSULE ORAL WEEKLY
COMMUNITY

## 2024-02-13 RX ORDER — ASPIRIN 81 MG/1
81 TABLET ORAL DAILY
Status: DISCONTINUED | OUTPATIENT
Start: 2024-02-14 | End: 2024-02-17 | Stop reason: HOSPADM

## 2024-02-13 RX ORDER — INSULIN ASPART 100 [IU]/ML
30 INJECTION, SUSPENSION SUBCUTANEOUS 2 TIMES DAILY
COMMUNITY

## 2024-02-13 RX ORDER — AMMONIUM LACTATE 12 G/100G
CREAM TOPICAL 2 TIMES DAILY PRN
Status: DISCONTINUED | OUTPATIENT
Start: 2024-02-13 | End: 2024-02-17 | Stop reason: HOSPADM

## 2024-02-13 RX ORDER — BISACODYL 10 MG
10 SUPPOSITORY, RECTAL RECTAL DAILY PRN
Status: DISCONTINUED | OUTPATIENT
Start: 2024-02-13 | End: 2024-02-17 | Stop reason: HOSPADM

## 2024-02-13 RX ORDER — LEVETIRACETAM 500 MG/1
500 TABLET ORAL 2 TIMES DAILY
Status: DISCONTINUED | OUTPATIENT
Start: 2024-02-13 | End: 2024-02-17 | Stop reason: HOSPADM

## 2024-02-13 RX ORDER — ENOXAPARIN SODIUM 100 MG/ML
40 INJECTION SUBCUTANEOUS DAILY
Status: DISCONTINUED | OUTPATIENT
Start: 2024-02-13 | End: 2024-02-17 | Stop reason: HOSPADM

## 2024-02-13 RX ORDER — MULTIPLE VITAMINS W/ MINERALS TAB 9MG-400MCG
1 TAB ORAL DAILY
COMMUNITY

## 2024-02-13 RX ORDER — INSULIN LISPRO 100 [IU]/ML
2-7 INJECTION, SOLUTION INTRAVENOUS; SUBCUTANEOUS
Status: DISCONTINUED | OUTPATIENT
Start: 2024-02-13 | End: 2024-02-17 | Stop reason: HOSPADM

## 2024-02-13 RX ORDER — LOSARTAN POTASSIUM 50 MG/1
100 TABLET ORAL
Status: DISCONTINUED | OUTPATIENT
Start: 2024-02-14 | End: 2024-02-16

## 2024-02-13 RX ADMIN — SODIUM ZIRCONIUM CYCLOSILICATE 10 G: 10 POWDER, FOR SUSPENSION ORAL at 20:13

## 2024-02-13 RX ADMIN — SODIUM CHLORIDE, POTASSIUM CHLORIDE, SODIUM LACTATE AND CALCIUM CHLORIDE 100 ML/HR: 600; 310; 30; 20 INJECTION, SOLUTION INTRAVENOUS at 20:13

## 2024-02-13 RX ADMIN — INSULIN LISPRO 3 UNITS: 100 INJECTION, SOLUTION INTRAVENOUS; SUBCUTANEOUS at 20:13

## 2024-02-13 RX ADMIN — Medication 10 ML: at 20:15

## 2024-02-13 RX ADMIN — SODIUM CHLORIDE 1000 ML: 9 INJECTION, SOLUTION INTRAVENOUS at 15:06

## 2024-02-13 RX ADMIN — MORPHINE SULFATE 4 MG: 4 INJECTION, SOLUTION INTRAMUSCULAR; INTRAVENOUS at 13:35

## 2024-02-13 RX ADMIN — HYDROMORPHONE HYDROCHLORIDE 0.5 MG: 1 INJECTION, SOLUTION INTRAMUSCULAR; INTRAVENOUS; SUBCUTANEOUS at 15:09

## 2024-02-13 RX ADMIN — MORPHINE SULFATE 4 MG: 4 INJECTION, SOLUTION INTRAMUSCULAR; INTRAVENOUS at 21:00

## 2024-02-13 RX ADMIN — TAMSULOSIN HYDROCHLORIDE 0.4 MG: 0.4 CAPSULE ORAL at 20:13

## 2024-02-13 RX ADMIN — CEFTRIAXONE 2000 MG: 2 INJECTION, POWDER, FOR SOLUTION INTRAMUSCULAR; INTRAVENOUS at 20:12

## 2024-02-13 RX ADMIN — INSULIN GLARGINE 25 UNITS: 100 INJECTION, SOLUTION SUBCUTANEOUS at 20:13

## 2024-02-13 RX ADMIN — CLONIDINE HYDROCHLORIDE 0.2 MG: 0.1 TABLET ORAL at 20:13

## 2024-02-13 RX ADMIN — ONDANSETRON 4 MG: 2 INJECTION INTRAMUSCULAR; INTRAVENOUS at 13:34

## 2024-02-13 RX ADMIN — ENOXAPARIN SODIUM 40 MG: 100 INJECTION SUBCUTANEOUS at 18:49

## 2024-02-13 RX ADMIN — LEVETIRACETAM 500 MG: 500 TABLET, FILM COATED ORAL at 20:13

## 2024-02-13 NOTE — LETTER
EMS Transport Request  For use at [facility], [place], [place], [place], and [place] only   Patient Name: Isaias Molina : 1955   Weight:118 kg (259 lb 0.7 oz) Pick-up Location: [facility] BLS/ALS: BLS/ALS: BLS   Insurance: MEDICARE Auth End Date: 24   Pre-Cert #: D/C Summary complete:    Destination: [facility]   Contact Precautions: None   Equipment (O2, Fluids, etc.): None   Arrive By Date/Time: 24 Stretcher/WC: Wheelchair   CM Requesting: Ivana Meadows RN Ext:    Notes/Medical Necessity:      ______________________________________________________________________    *Only 2 patient bags OR 1 carry-on size bag are permitted.  Wheelchairs and walkers CANNOT transported with the patient. Acknowledge: Yes

## 2024-02-13 NOTE — ED PROVIDER NOTES
Subjective   History of Present Illness  Chief Complaint: Fall, left shoulder pain      HPI: Patient is a 68-year-old male who presents by EMS from local extended care facility.  He states he was attempting to sit on his bed when he missed the bed falling to his left shoulder.  Increased pain to the left shoulder he did not have prolonged downtime states he was able to yell out and have staff assist him upright.  He did not hit his head or lose consciousness.    PCP: Deloris    History provided by:  Patient      Review of Systems   Musculoskeletal:  Positive for arthralgias.       Past Medical History:   Diagnosis Date    Cellulitis 10/15/2013    Encounter for screening for malignant neoplasm of prostate 12/4/2017    Encounter for screening for other disorder 12/4/2017    Need for prophylactic vaccination against Streptococcus pneumoniae (pneumococcus) 10/19/2018    Onychomycosis 10/19/2018    Pain in right shoulder 10/3/2018    TIA (transient ischemic attack) 01/05/2020       Allergies   Allergen Reactions    Shellfish Allergy Nausea And Vomiting       Past Surgical History:   Procedure Laterality Date    CARDIAC CATHETERIZATION N/A 6/9/2021    Procedure: Left Heart Cath and coronary angiogram;  Surgeon: Eligio Robles MD;  Location: [facility] CATH INVASIVE LOCATION;  Service: Cardiovascular;  Laterality: N/A;    CARDIAC CATHETERIZATION N/A 6/11/2021    Procedure: Percutaneous Coronary Intervention;  Surgeon: Eligio Robles MD;  Location: [facility] INVASIVE LOCATION;  Service: Cardiovascular;  Laterality: N/A;   PTCA- LAD    CARDIAC CATHETERIZATION N/A 6/11/2021    Procedure: Stent TEJAL coronary;  Surgeon: Eligio Robles MD;  Location: [facility] CATH INVASIVE LOCATION;  Service: Cardiovascular;  Laterality: N/A;   LAD    CATARACT EXTRACTION WITH INTRAOCULAR LENS IMPLANT Bilateral     CORONARY ANGIOPLASTY WITH STENT PLACEMENT  2011    PTCA - LCx & RCA with stent    ROTATOR CUFF REPAIR Right     TOTAL SHOULDER REVERSE  "ARTHROPLASTY Right     after fracture 2018       Family History   Problem Relation Age of Onset    Cerebral aneurysm Father     Peripheral vascular disease Brother     Diabetes type II Brother     Nephrotic syndrome Grandson     Nephrotic syndrome Granddaughter        Social History     Socioeconomic History    Marital status:    Tobacco Use    Smoking status: Never    Smokeless tobacco: Never   Substance and Sexual Activity    Alcohol use: Never    Drug use: Never    Sexual activity: Defer           Objective   Physical Exam  Vitals reviewed.   Constitutional:       General: He is in acute distress.      Appearance: He is obese.      Comments: Visibly uncomfortable   HENT:      Head: Normocephalic.   Eyes:      Extraocular Movements: Extraocular movements intact.      Pupils: Pupils are equal, round, and reactive to light.   Cardiovascular:      Rate and Rhythm: Normal rate.      Pulses: Normal pulses.   Pulmonary:      Effort: Pulmonary effort is normal.      Breath sounds: Normal breath sounds.   Musculoskeletal:         General: Swelling, tenderness, deformity and signs of injury present.      Left shoulder: Tenderness present. Decreased range of motion.   Skin:     General: Skin is warm and dry.      Capillary Refill: Capillary refill takes less than 2 seconds.   Neurological:      General: No focal deficit present.      Mental Status: He is alert and oriented to person, place, and time. Mental status is at baseline.         Procedures           ED Course  ED Course as of 02/13/24 1705   Tue Feb 13, 2024   1530 Spoke with Dr. Hadley of the hospitalist group who requested CT of the head be ordered, orders placed and pending at time of admission. [BH]      ED Course User Index  [BH] Maribeth Hyatt APRN      /67 (BP Location: Right arm, Patient Position: Lying)   Pulse 73   Temp 97.8 °F (36.6 °C) (Oral)   Resp 17   Ht 175.3 cm (69\")   Wt 107 kg (236 lb)   SpO2 90%   BMI 34.85 kg/m² "   Labs Reviewed   BASIC METABOLIC PANEL - Abnormal; Notable for the following components:       Result Value    Glucose 156 (*)     BUN 38 (*)     Creatinine 2.65 (*)     Potassium 5.5 (*)     Chloride 109 (*)     eGFR 25.5 (*)     All other components within normal limits    Narrative:     GFR Normal >60  Chronic Kidney Disease <60  Kidney Failure <15     CBC WITH AUTO DIFFERENTIAL - Abnormal; Notable for the following components:    WBC 22.20 (*)     Hemoglobin 12.7 (*)     All other components within normal limits    Narrative:     The previously reported component NRBC is no longer being reported. Previous result was 0.0 /100 WBC (Reference Range: 0.0-0.2 /100 WBC) on 2/13/2024 at 1342 EST.   MANUAL DIFFERENTIAL - Abnormal; Notable for the following components:    Monocyte % 2.0 (*)     Bands %  6.0 (*)     Neutrophils Absolute 16.87 (*)     Lymphocytes Absolute 4.66 (*)     Basophils Absolute 0.22 (*)     All other components within normal limits   CK - Normal   BLOOD CULTURE   BLOOD CULTURE   SCAN SLIDE   URINALYSIS W/ CULTURE IF INDICATED   BASIC METABOLIC PANEL   MAGNESIUM   CBC WITH AUTO DIFFERENTIAL   CBC AND DIFFERENTIAL    Narrative:     The following orders were created for panel order CBC & Differential.  Procedure                               Abnormality         Status                     ---------                               -----------         ------                     CBC Auto Differential[[id number]]        Abnormal            Final result               Scan Slide[[id number]]                                       Final result                 Please view results for these tests on the individual orders.   EXTRA TUBES    Narrative:     The following orders were created for panel order Extra Tubes.  Procedure                               Abnormality         Status                     ---------                               -----------         ------                     Gold Top - [facility][[id number]]                                    Final result               Light Blue Top[[id number]]                                   Final result                 Please view results for these tests on the individual orders.   [facility]   LIGHT BLUE TOP   CBC AND DIFFERENTIAL    Narrative:     The following orders were created for panel order CBC & Differential.  Procedure                               Abnormality         Status                     ---------                               -----------         ------                     CBC Auto Differential[[id number]]                                                         Please view results for these tests on the individual orders.     Medications   sodium chloride 0.9 % flush 10 mL (has no administration in time range)   sodium chloride 0.9 % flush 10 mL (has no administration in time range)   sodium chloride 0.9 % flush 10 mL (has no administration in time range)   sodium chloride 0.9 % infusion 40 mL (has no administration in time range)   Enoxaparin Sodium (LOVENOX) syringe 40 mg (has no administration in time range)   sennosides-docusate (PERICOLACE) 8.6-50 MG per tablet 2 tablet (has no administration in time range)     And   polyethylene glycol (MIRALAX) packet 17 g (has no administration in time range)     And   bisacodyl (DULCOLAX) EC tablet 5 mg (has no administration in time range)     And   bisacodyl (DULCOLAX) suppository 10 mg (has no administration in time range)   morphine injection 4 mg (4 mg Intravenous Given 2/13/24 1335)   ondansetron (ZOFRAN) injection 4 mg (4 mg Intravenous Given 2/13/24 1334)   sodium chloride 0.9 % bolus 1,000 mL (1,000 mL Intravenous New Bag 2/13/24 1506)   HYDROmorphone (DILAUDID) injection 0.5 mg (0.5 mg Intravenous Given 2/13/24 1509)     CT Abdomen Pelvis Without Contrast    Result Date: 2/13/2024  Impression: Cholelithiasis. Mild gallbladder distention. This may be secondary to prolonged fasting state. If there is concern for  cholecystitis, correlate with ultrasound and/or HIDA. Marked prostatomegaly. Electronically Signed: Jordan Ortiz MD  2/13/2024 4:12 PM EST  Workstation ID: [id number]    CT Head Without Contrast    Result Date: 2/13/2024  Impression: 1.Old insult to the right of subdural/temporal area 2.Interval insult right cerebellum that appears more chronic 3.Diffuse white matter changes involving the cerebral hemispheres which could reflect more chronic small vessel ischemic change. 4.Global atrophy 5.Minimal right ethmoid mucosal thickening. Electronically Signed: Dinh Freire MD  2/13/2024 4:08 PM EST  Workstation ID: [id number]    XR Chest 1 View    Result Date: 2/13/2024  Impression: Somewhat limited study. No acute process identified. Electronically Signed: Amanda Mccarthy MD  2/13/2024 2:08 PM EST  Workstation ID: [id number]    XR Shoulder 2+ View Left    Result Date: 2/13/2024  Impression: Comminuted mildly displaced left humeral neck fracture with impaction and varus angulation of the distal fracture fragment, as well as extension to the tuberosities. Electronically Signed: Garrett Jarvis MD  2/13/2024 2:07 PM EST  Workstation ID: [id number]                                          Medical Decision Making  Patient presented with above complaints on physical exam notable discomfort an IV was established she was initially treated with morphine and Zofran x-ray obtained noted a proximal fracture of the left humerus distal neurovascular intact.  Chest x-ray obtained was negative for pneumonia as patient has significant leukocytosis with a white blood cell count of 22, blood cultures added and pending.  He is afebrile without hypoxia or hypotension.  CMP notes a creatinine of 2.65 with a potassium of 5.5 he has a known history of renal insufficiency but this is significantly elevated from his baseline.  CT of the abdomen pelvis was pending at time of admission as well as a CT of the head that was requested by the hospitalist after  speaking with Dr. Hadley who agreed to patient admission.  At reevaluation patient's pain was readdressed with additional pain medication.  Discussed plan for admission with the patient who is given verbal understanding denies further questions or complaints.  He was placed in a sling for comfort.    Chart review: 11/3/2023 outpatient visit with nephrology related to CKD stage III      Note Disclaimer: At [facility], we believe that sharing information builds trust and better  relationships. You are receiving this note because you recently visited [facility]. It is possible you will see health information before a provider has talked with you about it. This kind of information can be easy to misunderstand. To help you fully understand what it means for your health, we urge you to discuss this note with your provider.       Part of this note may be an electronic transcription/translation of spoken language to printed text using the Dragon Dictation System.    Appropriate PPE worn during exam.    Problems Addressed:  Fall, initial encounter: complicated acute illness or injury  Leukocytosis, unspecified type: complicated acute illness or injury  Other closed nondisplaced fracture of proximal end of left humerus, initial encounter: complicated acute illness or injury    Amount and/or Complexity of Data Reviewed  Labs: ordered.  Radiology: ordered.    Risk  Prescription drug management.  Decision regarding hospitalization.        Final diagnoses:   Other closed nondisplaced fracture of proximal end of left humerus, initial encounter   Fall, initial encounter   Leukocytosis, unspecified type       ED Disposition  ED Disposition       ED Disposition   Decision to Admit    Condition   --    Comment   Level of Care: Med/Surg [1]   Diagnosis: Left humeral fracture [461307]   Admitting Physician: ALVAREZ, CARLOS LLANES [[id number]]   Attending Physician: ALVAREZ, CARLOS LLANES [[id number]]   Certification: I Certify That  Inpatient Hospital Services Are Medically Necessary For Greater Than 2 Midnights                 No follow-up provider specified.       Medication List      No changes were made to your prescriptions during this visit.            Maribeth Hyatt, APRN  02/13/24 8475

## 2024-02-13 NOTE — DISCHARGE PLACEMENT REQUEST
"Isaias Holly P (68 y.o. Male)       Date of Birth   1955    Social Security Number       Address   [address] [place] [address]    Home Phone   [phone]    MRN   [id number]       [place]   Protestant    Marital Status                               Admission Date   2/13/24    Admission Type   Emergency    Admitting Provider       Attending Provider   Rahat Swaant MD    Department, Room/Bed   [facility] EMERGENCY DEPARTMENT, 28/28       Discharge Date       Discharge Disposition       Discharge Destination                                 Attending Provider: Rahat Sawant MD    Allergies: Shellfish Allergy    Isolation: None   Infection: None   Code Status: Prior    Ht: 175.3 cm (69\")   Wt: 107 kg (236 lb)    Admission Cmt: None   Principal Problem: None                  Active Insurance as of 2/13/2024       Primary Coverage       Payor Plan Insurance Group Employer/Plan Group    MEDICARE MEDICARE A & B        Payor Plan Address Payor Plan Phone Number Payor Plan Fax Number Effective Dates    [address] [phone]  11/1/2020 - None Entered    [facility] [id number]         Subscriber Name Subscriber Birth Date Member ID       ISAIAS HOLLY P 1955 [id number]               Secondary Coverage       Payor Plan Insurance Group Employer/Plan Group    [place]        Payor Plan Address Payor Plan Phone Number Payor Plan Fax Number Effective Dates    [address]   4/1/2021 - None Entered    [place] [address]         Subscriber Name Subscriber Birth Date Member ID       ISAIAS HOLLY P 1955 [id number]                     Emergency Contacts        (Rel.) Home Phone Work Phone Mobile Phone    EMERY HOLLY (Son) -- -- [phone]    KATARZYNA HOLLY (Son) -- -- [phone]                "

## 2024-02-13 NOTE — CASE MANAGEMENT/SOCIAL WORK
Discharge Planning Assessment  [facility]     Patient Name: Isaias Molina  MRN: [id number]  Today's Date: 2/13/2024    Admit Date: 2/13/2024    Plan: Return to [facility] bed(has been resident for 2 yrs)   Discharge Needs Assessment       Row Name 02/13/24 1522       Living Environment    People in Home other (see comments)    Current Living Arrangements extended care facility  [facility]    Potentially Unsafe Housing Conditions none    Primary Care Provided by other (see comments);child(pranav)  staff at [facility]    Provides Primary Care For no one    Family Caregiver if Needed child(pranav), adult    Quality of Family Relationships helpful;supportive    Able to Return to Prior Arrangements yes       Resource/Environmental Concerns    Transportation Concerns none  will need  assistance       Transition Planning    Patient/Family Anticipates Transition to long-term care facility  [facility]       Discharge Needs Assessment    Readmission Within the Last 30 Days no previous admission in last 30 days    Equipment Currently Used at Home wheelchair    Concerns to be Addressed discharge planning                   Discharge Plan       Row Name 02/13/24 2351       Plan    Plan Return to [facility] bed(has been resident for 2 yrs)    Plan Comments Spoke wtih patient at bedside, states has resided at [facility] for 2 yrs and plans to return. Son assist as needed. He was not sure of son could take him back to facility. May need our transportation service.                  Continued Care and Services - Admitted Since 2/13/2024       Destination       Service Provider Request Status Selected Services Address Phone Fax Patient Preferred    [facility] Pending - Request Sent N/A [address] [place] [address] [phone] --                     Demographic Summary       Row Name 02/13/24 1521       General Information    Admission Type other (see comments)  currently no  status    Arrived From long-term [facility]    Referral Source patient    Reason for Consult discharge planning    Preferred Language English                   Functional Status       Row Name 02/13/24 1522       Functional Status    Usual Activity Tolerance moderate    Current Activity Tolerance moderate       Functional Status, IADL    Medications completely dependent    Meal Preparation completely dependent    Housekeeping completely dependent    Laundry completely dependent    Shopping completely dependent       Mental Status    General Appearance WDL WDL           Met with patient in room wearing PPE: mask, face shield/goggles, gloves, gown.      Maintained distance greater than six feet and spent less than 15 minutes in the room.       Zainab Razo RN

## 2024-02-13 NOTE — Clinical Note
Level of Care: Med/Surg [1]   Diagnosis: Left humeral fracture [[id number]]   Admitting Physician: ALVAREZ, CARLOS LLANES [[id number]]   Attending Physician: ALVAREZ, CARLOS LLANES [[id number]]   Certification: I Certify That Inpatient Hospital Services Are Medically Necessary For Greater Than 2 Midnights

## 2024-02-14 LAB
ANION GAP SERPL CALCULATED.3IONS-SCNC: 11 MMOL/L (ref 5–15)
ANION GAP SERPL CALCULATED.3IONS-SCNC: 13 MMOL/L (ref 5–15)
ANISOCYTOSIS BLD QL: ABNORMAL
BACTERIA UR QL AUTO: ABNORMAL /HPF
BASOPHILS # BLD AUTO: 0.1 10*3/MM3 (ref 0–0.2)
BASOPHILS NFR BLD AUTO: 0.5 % (ref 0–1.5)
BILIRUB UR QL STRIP: NEGATIVE
BILIRUB UR QL STRIP: NEGATIVE
BUN SERPL-MCNC: 35 MG/DL (ref 8–23)
BUN SERPL-MCNC: 44 MG/DL (ref 8–23)
BUN/CREAT SERPL: 15.2 (ref 7–25)
BUN/CREAT SERPL: 16 (ref 7–25)
CALCIUM SPEC-SCNC: 7.5 MG/DL (ref 8.6–10.5)
CALCIUM SPEC-SCNC: 8.1 MG/DL (ref 8.6–10.5)
CHLORIDE SERPL-SCNC: 106 MMOL/L (ref 98–107)
CHLORIDE SERPL-SCNC: 108 MMOL/L (ref 98–107)
CLARITY UR: ABNORMAL
CLARITY UR: ABNORMAL
CO2 SERPL-SCNC: 17 MMOL/L (ref 22–29)
CO2 SERPL-SCNC: 22 MMOL/L (ref 22–29)
COLOR UR: ABNORMAL
COLOR UR: ABNORMAL
CREAT SERPL-MCNC: 2.3 MG/DL (ref 0.76–1.27)
CREAT SERPL-MCNC: 2.75 MG/DL (ref 0.76–1.27)
D-LACTATE SERPL-SCNC: 1.1 MMOL/L (ref 0.5–2)
DEPRECATED RDW RBC AUTO: 45.9 FL (ref 37–54)
DEPRECATED RDW RBC AUTO: 46.8 FL (ref 37–54)
EGFRCR SERPLBLD CKD-EPI 2021: 24.4 ML/MIN/1.73
EGFRCR SERPLBLD CKD-EPI 2021: 30.2 ML/MIN/1.73
EOSINOPHIL # BLD AUTO: 0.5 10*3/MM3 (ref 0–0.4)
EOSINOPHIL # BLD MANUAL: 0.24 10*3/MM3 (ref 0–0.4)
EOSINOPHIL NFR BLD AUTO: 3.8 % (ref 0.3–6.2)
EOSINOPHIL NFR BLD MANUAL: 2 % (ref 0.3–6.2)
ERYTHROCYTE [DISTWIDTH] IN BLOOD BY AUTOMATED COUNT: 14.9 % (ref 12.3–15.4)
ERYTHROCYTE [DISTWIDTH] IN BLOOD BY AUTOMATED COUNT: 15 % (ref 12.3–15.4)
GLUCOSE BLDC GLUCOMTR-MCNC: 100 MG/DL (ref 70–105)
GLUCOSE BLDC GLUCOMTR-MCNC: 121 MG/DL (ref 70–105)
GLUCOSE BLDC GLUCOMTR-MCNC: 131 MG/DL (ref 70–105)
GLUCOSE BLDC GLUCOMTR-MCNC: 138 MG/DL (ref 70–105)
GLUCOSE SERPL-MCNC: 123 MG/DL (ref 65–99)
GLUCOSE SERPL-MCNC: 139 MG/DL (ref 65–99)
GLUCOSE UR STRIP-MCNC: NEGATIVE MG/DL
GLUCOSE UR STRIP-MCNC: NEGATIVE MG/DL
HCT VFR BLD AUTO: 28.5 % (ref 37.5–51)
HCT VFR BLD AUTO: 29.9 % (ref 37.5–51)
HGB BLD-MCNC: 10.2 G/DL (ref 13–17.7)
HGB BLD-MCNC: 9.6 G/DL (ref 13–17.7)
HGB UR QL STRIP.AUTO: ABNORMAL
HGB UR QL STRIP.AUTO: ABNORMAL
HYALINE CASTS UR QL AUTO: ABNORMAL /LPF
KETONES UR QL STRIP: NEGATIVE
KETONES UR QL STRIP: NEGATIVE
LEUKOCYTE ESTERASE UR QL STRIP.AUTO: ABNORMAL
LEUKOCYTE ESTERASE UR QL STRIP.AUTO: ABNORMAL
LYMPHOCYTES # BLD AUTO: 3.2 10*3/MM3 (ref 0.7–3.1)
LYMPHOCYTES # BLD MANUAL: 3.42 10*3/MM3 (ref 0.7–3.1)
LYMPHOCYTES NFR BLD AUTO: 24.8 % (ref 19.6–45.3)
LYMPHOCYTES NFR BLD MANUAL: 2 % (ref 5–12)
MAGNESIUM SERPL-MCNC: 1.6 MG/DL (ref 1.6–2.4)
MAGNESIUM SERPL-MCNC: 1.7 MG/DL (ref 1.6–2.4)
MCH RBC QN AUTO: 30 PG (ref 26.6–33)
MCH RBC QN AUTO: 30.2 PG (ref 26.6–33)
MCHC RBC AUTO-ENTMCNC: 33.7 G/DL (ref 31.5–35.7)
MCHC RBC AUTO-ENTMCNC: 34 G/DL (ref 31.5–35.7)
MCV RBC AUTO: 88.9 FL (ref 79–97)
MCV RBC AUTO: 89 FL (ref 79–97)
MONOCYTES # BLD AUTO: 1.2 10*3/MM3 (ref 0.1–0.9)
MONOCYTES # BLD: 0.24 10*3/MM3 (ref 0.1–0.9)
MONOCYTES NFR BLD AUTO: 9.4 % (ref 5–12)
MYELOCYTES NFR BLD MANUAL: 1 % (ref 0–0)
NEUTROPHILS # BLD AUTO: 8.17 10*3/MM3 (ref 1.7–7)
NEUTROPHILS NFR BLD AUTO: 61.5 % (ref 42.7–76)
NEUTROPHILS NFR BLD AUTO: 8 10*3/MM3 (ref 1.7–7)
NEUTROPHILS NFR BLD MANUAL: 65 % (ref 42.7–76)
NEUTS BAND NFR BLD MANUAL: 2 % (ref 0–5)
NITRITE UR QL STRIP: NEGATIVE
NITRITE UR QL STRIP: NEGATIVE
NRBC BLD AUTO-RTO: 0.1 /100 WBC (ref 0–0.2)
PH UR STRIP.AUTO: 5 [PH] (ref 5–8)
PH UR STRIP.AUTO: 5 [PH] (ref 5–8)
PLATELET # BLD AUTO: 167 10*3/MM3 (ref 140–450)
PLATELET # BLD AUTO: 173 10*3/MM3 (ref 140–450)
PMV BLD AUTO: 9.4 FL (ref 6–12)
PMV BLD AUTO: 9.5 FL (ref 6–12)
POTASSIUM SERPL-SCNC: 4.8 MMOL/L (ref 3.5–5.2)
POTASSIUM SERPL-SCNC: 4.9 MMOL/L (ref 3.5–5.2)
PROT UR QL STRIP: NEGATIVE
PROT UR QL STRIP: NEGATIVE
RBC # BLD AUTO: 3.21 10*6/MM3 (ref 4.14–5.8)
RBC # BLD AUTO: 3.36 10*6/MM3 (ref 4.14–5.8)
RBC # UR STRIP: ABNORMAL /HPF
REF LAB TEST METHOD: ABNORMAL
SCAN SLIDE: NORMAL
SMALL PLATELETS BLD QL SMEAR: ADEQUATE
SODIUM SERPL-SCNC: 138 MMOL/L (ref 136–145)
SODIUM SERPL-SCNC: 139 MMOL/L (ref 136–145)
SP GR UR STRIP: 1.03 (ref 1–1.03)
SP GR UR STRIP: 1.03 (ref 1–1.03)
SQUAMOUS #/AREA URNS HPF: ABNORMAL /HPF
UROBILINOGEN UR QL STRIP: ABNORMAL
UROBILINOGEN UR QL STRIP: ABNORMAL
VARIANT LYMPHS NFR BLD MANUAL: 28 % (ref 19.6–45.3)
WBC # UR STRIP: ABNORMAL /HPF
WBC MORPH BLD: NORMAL
WBC NRBC COR # BLD AUTO: 12.2 10*3/MM3 (ref 3.4–10.8)
WBC NRBC COR # BLD AUTO: 13 10*3/MM3 (ref 3.4–10.8)

## 2024-02-14 PROCEDURE — 25010000002 MORPHINE PER 10 MG: Performed by: STUDENT IN AN ORGANIZED HEALTH CARE EDUCATION/TRAINING PROGRAM

## 2024-02-14 PROCEDURE — 25010000002 ENOXAPARIN PER 10 MG: Performed by: STUDENT IN AN ORGANIZED HEALTH CARE EDUCATION/TRAINING PROGRAM

## 2024-02-14 PROCEDURE — 97165 OT EVAL LOW COMPLEX 30 MIN: CPT

## 2024-02-14 PROCEDURE — 82948 REAGENT STRIP/BLOOD GLUCOSE: CPT

## 2024-02-14 PROCEDURE — 85025 COMPLETE CBC W/AUTO DIFF WBC: CPT | Performed by: INTERNAL MEDICINE

## 2024-02-14 PROCEDURE — 96365 THER/PROPH/DIAG IV INF INIT: CPT

## 2024-02-14 PROCEDURE — 63710000001 INSULIN GLARGINE PER 5 UNITS: Performed by: STUDENT IN AN ORGANIZED HEALTH CARE EDUCATION/TRAINING PROGRAM

## 2024-02-14 PROCEDURE — 87086 URINE CULTURE/COLONY COUNT: CPT | Performed by: NURSE PRACTITIONER

## 2024-02-14 PROCEDURE — 96372 THER/PROPH/DIAG INJ SC/IM: CPT

## 2024-02-14 PROCEDURE — 25010000002 CEFTRIAXONE PER 250 MG: Performed by: INTERNAL MEDICINE

## 2024-02-14 PROCEDURE — 97162 PT EVAL MOD COMPLEX 30 MIN: CPT

## 2024-02-14 PROCEDURE — 96376 TX/PRO/DX INJ SAME DRUG ADON: CPT

## 2024-02-14 PROCEDURE — 25010000002 MORPHINE PER 10 MG

## 2024-02-14 PROCEDURE — 87102 FUNGUS ISOLATION CULTURE: CPT | Performed by: INTERNAL MEDICINE

## 2024-02-14 PROCEDURE — 83735 ASSAY OF MAGNESIUM: CPT | Performed by: STUDENT IN AN ORGANIZED HEALTH CARE EDUCATION/TRAINING PROGRAM

## 2024-02-14 PROCEDURE — 80048 BASIC METABOLIC PNL TOTAL CA: CPT | Performed by: INTERNAL MEDICINE

## 2024-02-14 PROCEDURE — 96361 HYDRATE IV INFUSION ADD-ON: CPT

## 2024-02-14 PROCEDURE — 83735 ASSAY OF MAGNESIUM: CPT | Performed by: INTERNAL MEDICINE

## 2024-02-14 PROCEDURE — 82948 REAGENT STRIP/BLOOD GLUCOSE: CPT | Performed by: STUDENT IN AN ORGANIZED HEALTH CARE EDUCATION/TRAINING PROGRAM

## 2024-02-14 PROCEDURE — 83605 ASSAY OF LACTIC ACID: CPT | Performed by: INTERNAL MEDICINE

## 2024-02-14 PROCEDURE — 25810000003 LACTATED RINGERS PER 1000 ML: Performed by: STUDENT IN AN ORGANIZED HEALTH CARE EDUCATION/TRAINING PROGRAM

## 2024-02-14 PROCEDURE — 80048 BASIC METABOLIC PNL TOTAL CA: CPT | Performed by: STUDENT IN AN ORGANIZED HEALTH CARE EDUCATION/TRAINING PROGRAM

## 2024-02-14 PROCEDURE — 85025 COMPLETE CBC W/AUTO DIFF WBC: CPT | Performed by: STUDENT IN AN ORGANIZED HEALTH CARE EDUCATION/TRAINING PROGRAM

## 2024-02-14 PROCEDURE — 81001 URINALYSIS AUTO W/SCOPE: CPT | Performed by: NURSE PRACTITIONER

## 2024-02-14 PROCEDURE — 85007 BL SMEAR W/DIFF WBC COUNT: CPT | Performed by: STUDENT IN AN ORGANIZED HEALTH CARE EDUCATION/TRAINING PROGRAM

## 2024-02-14 RX ORDER — HYDROCODONE BITARTRATE AND ACETAMINOPHEN 5; 325 MG/1; MG/1
1 TABLET ORAL EVERY 6 HOURS PRN
Qty: 12 TABLET | Refills: 0 | Status: SHIPPED | OUTPATIENT
Start: 2024-02-14 | End: 2024-02-17 | Stop reason: SDUPTHER

## 2024-02-14 RX ORDER — CEPHALEXIN 250 MG/1
250 CAPSULE ORAL 3 TIMES DAILY
Qty: 28 CAPSULE | Refills: 0 | Status: SHIPPED | OUTPATIENT
Start: 2024-02-14 | End: 2024-02-17 | Stop reason: SDUPTHER

## 2024-02-14 RX ADMIN — INSULIN GLARGINE 25 UNITS: 100 INJECTION, SOLUTION SUBCUTANEOUS at 20:32

## 2024-02-14 RX ADMIN — MORPHINE SULFATE 4 MG: 4 INJECTION, SOLUTION INTRAMUSCULAR; INTRAVENOUS at 01:48

## 2024-02-14 RX ADMIN — CLONIDINE HYDROCHLORIDE 0.2 MG: 0.1 TABLET ORAL at 20:31

## 2024-02-14 RX ADMIN — LEVOTHYROXINE SODIUM 25 MCG: 0.03 TABLET ORAL at 08:59

## 2024-02-14 RX ADMIN — MORPHINE SULFATE 4 MG: 4 INJECTION, SOLUTION INTRAMUSCULAR; INTRAVENOUS at 06:23

## 2024-02-14 RX ADMIN — MORPHINE SULFATE 4 MG: 4 INJECTION, SOLUTION INTRAMUSCULAR; INTRAVENOUS at 21:50

## 2024-02-14 RX ADMIN — LEVETIRACETAM 500 MG: 500 TABLET, FILM COATED ORAL at 20:31

## 2024-02-14 RX ADMIN — LEVETIRACETAM 500 MG: 500 TABLET, FILM COATED ORAL at 08:58

## 2024-02-14 RX ADMIN — Medication 10 ML: at 08:58

## 2024-02-14 RX ADMIN — ENOXAPARIN SODIUM 40 MG: 100 INJECTION SUBCUTANEOUS at 17:55

## 2024-02-14 RX ADMIN — SODIUM CHLORIDE, POTASSIUM CHLORIDE, SODIUM LACTATE AND CALCIUM CHLORIDE 100 ML/HR: 600; 310; 30; 20 INJECTION, SOLUTION INTRAVENOUS at 21:14

## 2024-02-14 RX ADMIN — AMLODIPINE BESYLATE 10 MG: 5 TABLET ORAL at 08:59

## 2024-02-14 RX ADMIN — ESCITALOPRAM OXALATE 10 MG: 10 TABLET ORAL at 08:58

## 2024-02-14 RX ADMIN — METOPROLOL SUCCINATE 50 MG: 50 TABLET, EXTENDED RELEASE ORAL at 08:58

## 2024-02-14 RX ADMIN — ATORVASTATIN CALCIUM 80 MG: 40 TABLET, FILM COATED ORAL at 08:58

## 2024-02-14 RX ADMIN — TAMSULOSIN HYDROCHLORIDE 0.4 MG: 0.4 CAPSULE ORAL at 08:58

## 2024-02-14 RX ADMIN — CLOPIDOGREL BISULFATE 75 MG: 75 TABLET ORAL at 08:58

## 2024-02-14 RX ADMIN — CLONIDINE HYDROCHLORIDE 0.2 MG: 0.1 TABLET ORAL at 08:58

## 2024-02-14 RX ADMIN — CEFTRIAXONE 2000 MG: 2 INJECTION, POWDER, FOR SOLUTION INTRAMUSCULAR; INTRAVENOUS at 10:50

## 2024-02-14 RX ADMIN — TAMSULOSIN HYDROCHLORIDE 0.4 MG: 0.4 CAPSULE ORAL at 20:31

## 2024-02-14 RX ADMIN — MORPHINE SULFATE 4 MG: 4 INJECTION, SOLUTION INTRAMUSCULAR; INTRAVENOUS at 17:55

## 2024-02-14 RX ADMIN — Medication 10 ML: at 20:32

## 2024-02-14 RX ADMIN — ASPIRIN 81 MG: 81 TABLET, COATED ORAL at 08:58

## 2024-02-14 NOTE — PROGRESS NOTES
[facility]     Progress Note    Patient Name: Isaias Molina  : 1955  MRN: [id number]  Primary Care Physician:  Marizol Puentes MD  Date of admission: 2024  Service date and time: 24 13:44 EST  Subjective   Subjective     Chief Complaint: fall    HPI:  Patient complaining of dysuria      Objective   Objective     Vitals:   Temp:  [97.4 °F (36.3 °C)-98.2 °F (36.8 °C)] 98 °F (36.7 °C)  Heart Rate:  [55-76] 55  Resp:  [12-18] 12  BP: (105-138)/(66-89) 105/68  Physical Exam    Constitutional: Awake, alert in no distress    Eyes: PERRLA, sclerae anicteric, no conjunctival injection   HENT: NCAT, mucous membranes moist   Neck: Supple, no thyromegaly, no lymphadenopathy, trachea midline   Respiratory: Clear to auscultation bilaterally, nonlabored respirations    Cardiovascular: RRR, no murmurs, rubs, or gallops, palpable pedal pulses bilaterally   Gastrointestinal: Positive bowel sounds, soft, nontender, nondistended   Musculoskeletal: No bilateral ankle edema, no clubbing or cyanosis to extremities   Psychiatric: Appropriate affect, cooperative   Neurologic: Oriented x 3, strength symmetric in all extremities, Cranial Nerves grossly intact to confrontation, speech clear   Skin: No rashes     Result Review    Result Review:  I have personally reviewed the results from the time of this admission to 2024 13:44 EST and agree with these findings:  [x]  Laboratory list / accordion  []  Microbiology  []  Radiology  []  EKG/Telemetry   []  Cardiology/Vascular   []  Pathology  []  Old records  []  Other:  Most notable findings include: BUN 44, creatinine 2.75, glucose 123, sodium 139, potassium 4.8, chloride 106, CO2 22, magnesium 1.7, WBC count 13, hemoglobin 10.2, hematocrit 29.9 platelet count 173  Urinalysis cloudy in appearance large positive blood 21-50 WBC too numerous to count RBC      Assessment & Plan   Assessment / Plan       Active Hospital Problems:  Active Hospital Problems     Diagnosis     **Left humeral fracture     Leukocytosis     DYLON (acute kidney injury)     Unsteady gait      Plan:    S/P fall with mildly displaced left humeral neck fracture  X ray left shoulder on admission showing comminuted displaced left humeral neck fracture.   Sling placed in ED  Tylenol 650 mg q 6 for mild pain  Morphine 4 mg q 4 as needed for severe pain  PT/OT evaluation  Fall precautions  Frequent neurovascular checks  Orthopedic evaluation: No surgical intervention at this time     DYLON, likely prerenal superimposed on CKD stage 3  Hyperkalemia  Creatinine on admission 2.6(baseline 1.7) on 6/3/21  S/P lokelma 10 g in ED  Start  ml/hr, reassess rate in am.   Avoid nephrotoxic medications        History of type 2 DM  Start lantus 25 units HS plus correction scale     Hypertension  Norvasc 10 mg once daily  Holding losartan given DYLON  Metoprolol 50 mg once daily     History of CVA  Continue antiplatelets and statin     History of Hypothyroidism  Continue Synthroid 25 mcg once daily     History of seizure  Continue Keppra      Acute cystitis: Patient was started on ceftriaxone    DVT prophylaxis:  Medical DVT prophylaxis orders are present.        CODE STATUS:   Level Of Support Discussed With: Patient  Code Status (Patient has no pulse and is not breathing): CPR (Attempt to Resuscitate)  Medical Interventions (Patient has pulse or is breathing): Full Support    Disposition:  I expect patient to be discharged in .    Jordan Cherry MD

## 2024-02-14 NOTE — NURSING NOTE
Pt has not voided overnight, no documented urine output. NP Brigitte Nunes notified. Bladder scan 0 mL. Pt in no discomfort or urge to void, RN did not assess distended abd/pelvis. Orders received. RN did straight cath pt, but met resistance with prostate, small amount of blood-tinged urine drained and sent to lab for UA. Dr. Will to be consulted for DYLON.

## 2024-02-14 NOTE — THERAPY EVALUATION
Patient Name: Isaias Molina  : 1955    MRN: [id number]                              Today's Date: 2024       Admit Date: 2024    Visit Dx:     ICD-10-CM ICD-9-CM   1. Other closed nondisplaced fracture of proximal end of left humerus, initial encounter  S42.295A 812.09   2. Fall, initial encounter  W19.XXXA E888.9   3. Leukocytosis, unspecified type  D72.829 288.60     Patient Active Problem List   Diagnosis    Unsteady gait    Benign essential hypertension    Cerebrovascular accident (CVA)    Coronary artery disease    Homonymous hemianopia    Mixed hyperlipidemia    Obesity    Resting tremor    Tremor, essential    Type 2 diabetes mellitus    Vitamin B12 deficiency    Episode of recurrent major depressive disorder    Elevated PSA    Acquired hypothyroidism    Seizure as late effect of cerebrovascular accident (CVA)    Diabetic retinopathy    DYLON (acute kidney injury)    Abnormal nuclear stress test    Chronic kidney disease, stage 3b    Non-ST elevation MI (NSTEMI)    Left humeral fracture    Leukocytosis     Past Medical History:   Diagnosis Date    Cellulitis 10/15/2013    Encounter for screening for malignant neoplasm of prostate 2017    Encounter for screening for other disorder 2017    Need for prophylactic vaccination against Streptococcus pneumoniae (pneumococcus) 10/19/2018    Onychomycosis 10/19/2018    Pain in right shoulder 10/03/2018    Stroke     TIA (transient ischemic attack) 2020     Past Surgical History:   Procedure Laterality Date    CARDIAC CATHETERIZATION N/A 2021    Procedure: Left Heart Cath and coronary angiogram;  Surgeon: Eligio Robles MD;  Location: [facility] CATH INVASIVE LOCATION;  Service: Cardiovascular;  Laterality: N/A;    CARDIAC CATHETERIZATION N/A 2021    Procedure: Percutaneous Coronary Intervention;  Surgeon: Eligio Robles MD;  Location: [facility] CATH INVASIVE LOCATION;  Service: Cardiovascular;  Laterality: N/A;   PTCA- LAD     CARDIAC CATHETERIZATION N/A 6/11/2021    Procedure: Stent TEJAL coronary;  Surgeon: Eligio Robles MD;  Location: [facility] CATH INVASIVE LOCATION;  Service: Cardiovascular;  Laterality: N/A;   LAD    CATARACT EXTRACTION WITH INTRAOCULAR LENS IMPLANT Bilateral     CORONARY ANGIOPLASTY WITH STENT PLACEMENT  2011    PTCA - LCx & RCA with stent    ROTATOR CUFF REPAIR Right     TOTAL SHOULDER REVERSE ARTHROPLASTY Right     after fracture 2018      General Information       Row Name 02/14/24 1710          OT Time and Intention    Document Type evaluation  -MP     Mode of Treatment occupational therapy  -       Row Name 02/14/24 1710          General Information    Patient Profile Reviewed yes  -MP     Prior Level of Function independent:;ADL's  -MP     Existing Precautions/Restrictions fall;brace on at all times  -       Row Name 02/14/24 1710          Cognition    Orientation Status (Cognition) oriented to;person;place;disoriented to;situation  -       Row Name 02/14/24 1710          Safety Issues, Functional Mobility    Impairments Affecting Function (Mobility) balance;cognition;pain;strength;range of motion (ROM)  -MP               User Key  (r) = Recorded By, (t) = Taken By, (c) = Cosigned By      Initials Name Provider Type    MP Xavier Ferrell OT Occupational Therapist                     Mobility/ADL's       Row Name 02/14/24 1711          Bed Mobility    Bed Mobility scooting/bridging;supine-sit;sit-supine  -MP     Scooting/Bridging [place] (Bed Mobility) dependent (less than 25% patient effort)  -MP     Supine-Sit [place] (Bed Mobility) minimum assist (75% patient effort);moderate assist (50% patient effort)  -MP     Sit-Supine [place] (Bed Mobility) moderate assist (50% patient effort);2 person assist  -MP     Assistive Device (Bed Mobility) head of bed elevated;draw sheet  -       Row Name 02/14/24 1711          Sit-Stand Transfer    Sit-Stand [place] (Transfers) minimum assist (75%  patient effort);2 person assist  -MP       Row Name 02/14/24 1711          Activities of Daily Living    BADL Assessment/Intervention lower body dressing  -MP       Row Name 02/14/24 1711          Mobility    Extremity Weight-bearing Status left upper extremity  -MP     Left Upper Extremity (Weight-bearing Status) non weight-bearing (NWB)  -MP       Row Name 02/14/24 1711          Lower Body Dressing Assessment/Training    [place] Level (Lower Body Dressing) lower body dressing skills;maximum assist (25% patient effort)  -MP               User Key  (r) = Recorded By, (t) = Taken By, (c) = Cosigned By      Initials Name Provider Type    Xavier Rivero OT Occupational Therapist                   Obj/Interventions       Row Name 02/14/24 1711          Range of Motion Comprehensive    Comment, General Range of Motion RUE WFL  -[facility] Name 02/14/24 1711          Strength Comprehensive (MMT)    Comment, General Manual Muscle Testing (MMT) Assessment RUE 4/5  -MP       [facility] Name 02/14/24 1711          Balance    Balance Interventions sitting;standing;sit to stand;supported;static;dynamic  -MP               User Key  (r) = Recorded By, (t) = Taken By, (c) = Cosigned By      Initials Name Provider Type    Xavier Rivero OT Occupational Therapist                   Goals/Plan       Row Name 02/14/24 1714          Bed Mobility Goal 1 (OT)    Activity/Assistive Device (Bed Mobility Goal 1, OT) bed mobility activities, all  -MP     [place] Level/Cues Needed (Bed Mobility Goal 1, OT) minimum assist (75% or more patient effort)  -MP     Time Frame (Bed Mobility Goal 1, OT) long term goal (LTG);2 weeks  -MP       Row Name 02/14/24 1714          Transfer Goal 1 (OT)    Activity/Assistive Device (Transfer Goal 1, OT) sit-to-stand/stand-to-sit;toilet  -MP     [place] Level/Cues Needed (Transfer Goal 1, OT) minimum assist (75% or more patient effort)  -MP     Time Frame (Transfer Goal 1, OT) long term  goal (LTG);2 weeks  -MP       Row Name 02/14/24 1714          Dressing Goal 1 (OT)    Activity/Device (Dressing Goal 1, OT) lower body dressing  -MP     [place]/Cues Needed (Dressing Goal 1, OT) moderate assist (50-74% patient effort)  -MP     Time Frame (Dressing Goal 1, OT) long term goal (LTG);2 weeks  -MP               User Key  (r) = Recorded By, (t) = Taken By, (c) = Cosigned By      Initials Name Provider Type    MP Xavier Ferrell, KAMERON Occupational Therapist                   Clinical Impression       Row Name 02/14/24 1711          Pain Assessment    Pretreatment Pain Rating 0/10 - no pain  -MP     Posttreatment Pain Rating 0/10 - no pain  -MP       Row Name 02/14/24 1711          Pain Scale: FACES Pre/Post-Treatment    Pain: FACES Scale, Pretreatment 4-->hurts little more  -MP     Posttreatment Pain Rating 6-->hurts even more  -MP     Pain Location - Side/Orientation Left  -MP     Pain Location upper  -MP     Pain Location - extremity;back  -MP       Row Name 02/14/24 1711          Plan of Care Review    Plan of Care Reviewed With patient  -MP     Progress no change  -MP     Outcome Evaluation 67 yo male admitted from [facility] faciltiy after fall OOB.  Pt with c/o L shoulder pain and found to have L humerus fx.  Ortho consulted and plans non op tx in sling/swathe.  Pt is R handed, has A for ADLs but states he walks without an AD in facility.  Pt. completes bed mobility and sit to stand transfers this date w/ mod A to complete, A x 2 sit to stand from EOB w/ limited standing tolerance secondary to fatigue and increased pain. Pt. provided complete assist for all LB ADLs this date, A to manage sling for comfortability. Pt. will require SNF placement at d/c to address aforementioned deficits and facilitate return to PLOF.  -MP       Row Name 02/14/24 1711          Therapy Assessment/Plan (OT)    Rehab Potential (OT) good, to achieve stated therapy goals  -MP     Criteria for Skilled Therapeutic Interventions  Met (OT) yes;meets criteria;skilled treatment is necessary  -MP     Therapy Frequency (OT) 3 times/wk  -MP       Row Name 02/14/24 1711          Therapy Plan Review/Discharge Plan (OT)    Anticipated Discharge Disposition (OT) skilled nursing facility  -MP       Row Name 02/14/24 1711          Vital Signs    Pre Patient Position Supine  -MP     Intra Patient Position Standing  -MP     Post Patient Position Supine  -MP       Row Name 02/14/24 1711          Positioning and Restraints    Pre-Treatment Position in bed  -MP     Post Treatment Position bed  -MP     In Bed supine;call light within reach;encouraged to call for assist;exit alarm on  -MP               User Key  (r) = Recorded By, (t) = Taken By, (c) = Cosigned By      Initials Name Provider Type    Xavier Rivero OT Occupational Therapist                   Outcome Measures       Row Name 02/14/24 1354 02/14/24 0915       How much help from another person do you currently need...    Turning from your back to your side while in flat bed without using bedrails? 2  - 3  -JE    Moving from lying on back to sitting on the side of a flat bed without bedrails? 2  - 3  -JE    Moving to and from a bed to a chair (including a wheelchair)? 2  - 3  -JE    Standing up from a chair using your arms (e.g., wheelchair, bedside chair)? 2  - 3  -JE    Climbing 3-5 steps with a railing? 1  - 2  -    To walk in hospital room? 2  - 3  -JE    AM-PAC 6 Clicks Score (PT) 11  - 17  -    Highest Level of Mobility Goal 4 --> Transfer to chair/commode  - 5 --> Static standing  -              User Key  (r) = Recorded By, (t) = Taken By, (c) = Cosigned By      Initials Name Provider Type    Riddhi Engle, PT Physical Therapist    Roxanne Landrum, RN Registered Nurse                    Occupational Therapy Education       Title: PT OT SLP Therapies (In Progress)       Topic: Occupational Therapy (In Progress)       Point: ADL training (Not Started)        Description:   Instruct learner(s) on proper safety adaptation and remediation techniques during self care or transfers.   Instruct in proper use of assistive devices.                  Learner Progress:  Not documented in this visit.              Point: Home exercise program (Not Started)       Description:   Instruct learner(s) on appropriate technique for monitoring, assisting and/or progressing therapeutic exercises/activities.                  Learner Progress:  Not documented in this visit.              Point: Precautions (Not Started)       Description:   Instruct learner(s) on prescribed precautions during self-care and functional transfers.                  Learner Progress:  Not documented in this visit.              Point: Body mechanics (Done)       Description:   Instruct learner(s) on proper positioning and spine alignment during self-care, functional mobility activities and/or exercises.                  Learning Progress Summary             Patient Acceptance, E,TB, VU by JET at 2/14/2024 0371                                         User Key       Initials Effective Dates Name Provider Type Discipline    JET 06/16/21 -  Xavier Ferrell OT Occupational Therapist OT                  OT Recommendation and Plan  Therapy Frequency (OT): 3 times/wk  Plan of Care Review  Plan of Care Reviewed With: patient  Progress: no change  Outcome Evaluation: 69 yo male admitted from [facility] fac[facility] after fall OOB.  Pt with c/o L shoulder pain and found to have L humerus fx.  Ortho consulted and plans non op tx in sling/swathe.  Pt is R handed, has A for ADLs but states he walks without an AD in facility.  Pt. completes bed mobility and sit to stand transfers this date w/ mod A to complete, A x 2 sit to stand from EOB w/ limited standing tolerance secondary to fatigue and increased pain. Pt. provided complete assist for all LB ADLs this date, A to manage sling for comfortability. Pt. will require SNF placement at  d/c to address aforementioned deficits and facilitate return to PLOF.     Time Calculation:         Time Calculation- OT       Row Name 02/14/24 1714             Time Calculation- OT    OT Start Time 1030  -MP      OT Stop Time 1044  -MP      OT Time Calculation (min) 14 min  -      Total Timed Code Minutes- OT 0 minute(s)  -MP      OT Received On 02/14/24  -      OT - Next Appointment 02/16/24  -      OT Goal Re-Cert Due Date 02/28/24  -                User Key  (r) = Recorded By, (t) = Taken By, (c) = Cosigned By      Initials Name Provider Type    MP Xavier Ferrell OT Occupational Therapist                  Therapy Charges for Today       Code Description Service Date Service Provider Modifiers Qty    [id number] HC OT EVAL LOW COMPLEXITY 3 2/14/2024 Xavier Ferrell OT GO 1                 Xavier Ferrell OT  2/14/2024

## 2024-02-14 NOTE — PLAN OF CARE
Goal Outcome Evaluation:              Outcome Evaluation: alert and oriented x4. turning patient liana 2 hours and as needed. pain controlled with pain medications orn. X ray left shoulder on admission showing comminuted displaced left humeral neck fracture.   Sling placed, instructed patient on use and reason for.Orthopedic evaluation: No surgical intervention at this time. creeat up 2.75 spoke with dr. ohara normal saline at 100ml/hr.

## 2024-02-14 NOTE — H&P
[facility] [address] Medicine Services  History & Physical    Patient Name: Isaias Molina  : 1955  MRN: [id number]  Primary Care Physician:  Marizol Puentes MD  Date of admission: 2024  Date and Time of Service: 2024 at 1700    Subjective      Chief Complaint: fall    History of Present Illness: Isaias Molina is a 68 y.o. male with a PMH of  a CVA, coronary artery disease, CKD stage III, diabetic, seizure disorder, hypertension, major depressive disorder, BPH presented to [facility] on 2024 S/P fall from assisted living while attempting to get up off his bed.  Patient reports he has been feeling weak for the past 2 days and fell to his left side.  He denies hitting his head, headache, focal weakness, tingling or numbness, chest pain, shortness of breath.  He does report having flulike symptoms in the past week that have since resolved. He does report constant severe left shoulder pain ever since he fell today, which aggravated with any type of movement.  Evaluation in ED x-ray showed left shoulder showed comminuted mildly displaced left humeral neck fracture with impaction and varus circulation of the distal fracture fragment as well as extension to the tuberosities.  CT head showed old insult to the right subdural temporal area, there is a new finding of old insult in the right cerebellar hemisphere, chronic ischemic changes, global atrophy.  This is notable for elevated white blood cell of 22.2, hemoglobin is 12.7, chemistry notable for mild hyperkalemia potassium 5.5, elevated BUN 38, creatinine 2.6 which is elevated from his baseline 1.7 on .      Review of Systems   Constitutional:  Positive for fatigue. Negative for fever.   HENT:  Negative for drooling.    Eyes:  Negative for pain.   Respiratory:  Negative for cough, choking and shortness of breath.    Cardiovascular:  Negative for chest pain.   Genitourinary:  Negative for flank pain and  hematuria.   Musculoskeletal:  Positive for back pain, gait problem and joint swelling.       Personal History     Past Medical History:   Diagnosis Date    Cellulitis 10/15/2013    Encounter for screening for malignant neoplasm of prostate 12/4/2017    Encounter for screening for other disorder 12/4/2017    Need for prophylactic vaccination against Streptococcus pneumoniae (pneumococcus) 10/19/2018    Onychomycosis 10/19/2018    Pain in right shoulder 10/3/2018    TIA (transient ischemic attack) 01/05/2020       Past Surgical History:   Procedure Laterality Date    CARDIAC CATHETERIZATION N/A 6/9/2021    Procedure: Left Heart Cath and coronary angiogram;  Surgeon: Eligio Robles MD;  Location: [facility] CATH INVASIVE LOCATION;  Service: Cardiovascular;  Laterality: N/A;    CARDIAC CATHETERIZATION N/A 6/11/2021    Procedure: Percutaneous Coronary Intervention;  Surgeon: Eligio Robles MD;  Location: [facility] CATH INVASIVE LOCATION;  Service: Cardiovascular;  Laterality: N/A;   PTCA- LAD    CARDIAC CATHETERIZATION N/A 6/11/2021    Procedure: Stent TEAJL coronary;  Surgeon: Eligio Robles MD;  Location: [facility] CATH INVASIVE LOCATION;  Service: Cardiovascular;  Laterality: N/A;   LAD    CATARACT EXTRACTION WITH INTRAOCULAR LENS IMPLANT Bilateral     CORONARY ANGIOPLASTY WITH STENT PLACEMENT  2011    PTCA - LCx & RCA with stent    ROTATOR CUFF REPAIR Right     TOTAL SHOULDER REVERSE ARTHROPLASTY Right     after fracture 2018       Family History: family history includes Cerebral aneurysm in his father; Diabetes type II in his brother; Nephrotic syndrome in his granddaughter and grandson; Peripheral vascular disease in his brother. Otherwise pertinent FHx was reviewed and not pertinent to current issue.    Social History:  reports that he has never smoked. He has never used smokeless tobacco. He reports that he does not drink alcohol and does not use drugs.    Home Medications:  Prior to Admission Medications        Prescriptions Last Dose Informant Patient Reported? Taking?    acetaminophen (TYLENOL) 325 MG tablet   No No    Take 2 tablets by mouth Every 4 (Four) Hours As Needed for Mild Pain  or Fever (temperature greater than 101F).    amLODIPine (NORVASC) 10 MG tablet   No Yes    Take 1 tablet by mouth Daily.    ammonium lactate (LAC-HYDRIN) 12 % lotion   Yes No    Apply  topically to the appropriate area as directed 2 (Two) Times a Day As Needed for Dry Skin.    aspirin 81 MG EC tablet   Yes Yes    Take 1 tablet by mouth Daily.    atorvastatin (LIPITOR) 80 MG tablet   Yes Yes    Take 1 tablet by mouth Daily.    cloNIDine (CATAPRES) 0.2 MG tablet   Yes Yes    Take 1 tablet by mouth 2 (Two) Times a Day.    clopidogrel (PLAVIX) 75 MG tablet   No Yes    Take 1 tablet by mouth Daily.    Cyanocobalamin (B-12 Compliance Injection) 1000 MCG/ML kit   No Yes    Inject 1,000 mcg under the skin into the appropriate area as directed Every 30 (Thirty) Days.    docusate sodium (COLACE) 100 MG capsule   No Yes    Take 1 capsule by mouth Daily.    escitalopram (LEXAPRO) 10 MG tablet   No Yes    TAKE ONE TABLET BY MOUTH EVERY DAY    insulin aspart prot-insulin aspart (novoLOG 70/30) (70-30) 100 UNIT/ML injection   Yes Yes    Inject 30 Units under the skin into the appropriate area as directed 2 (Two) Times a Day.    levETIRAcetam (Keppra) 500 MG tablet   No Yes    Take 1 tablet by mouth 2 (Two) Times a Day.    levothyroxine (SYNTHROID) 25 MCG tablet   No Yes    Take 1 tablet by mouth Daily.    losartan (COZAAR) 100 MG tablet   No Yes    Take 1 tablet by mouth Daily.    metoprolol succinate XL (TOPROL-XL) 50 MG 24 hr tablet   No Yes    Take 1 tablet by mouth Daily.    multivitamin with minerals tablet tablet   Yes Yes    Take 1 tablet by mouth Daily.    nitroglycerin (NITROSTAT) 0.4 MG SL tablet   Yes No    Place 1 tablet under the tongue Every 5 (Five) Minutes As Needed for Chest Pain. Take no more than 3 doses in 15 minutes.     polyethylene glycol (MiraLax) 17 g packet   Yes Yes    Take 17 g by mouth Daily As Needed.    tamsulosin (FLOMAX) 0.4 MG capsule 24 hr capsule   No Yes    TAKE ONE CAPSULE BY MOUTH TWICE DAILY    traMADol (ULTRAM) 50 MG tablet   Yes No    Take 0.5 tablets by mouth Every 6 (Six) Hours As Needed for Moderate Pain.    vitamin D (ERGOCALCIFEROL) 1.25 MG (37151 UT) capsule capsule   Yes Yes    Take 1 capsule by mouth 1 (One) Time Per Week. On Saturday              Allergies:  Allergies   Allergen Reactions    Shellfish Allergy Nausea And Vomiting       Objective      Vitals:   Temp:  [97.8 °F (36.6 °C)] 97.8 °F (36.6 °C)  Heart Rate:  [60-76] 69  Resp:  [17-18] 17  BP: (110-138)/(66-84) 135/84  Body mass index is 34.85 kg/m².  Physical Exam  Constitutional:       Appearance: Normal appearance.   HENT:      Head: Normocephalic.      Nose: Nose normal.      Mouth/Throat:      Mouth: Mucous membranes are dry.   Eyes:      Extraocular Movements: Extraocular movements intact.      Pupils: Pupils are equal, round, and reactive to light.   Cardiovascular:      Rate and Rhythm: Normal rate and regular rhythm.      Pulses: Normal pulses.      Heart sounds: Normal heart sounds.   Pulmonary:      Breath sounds: Normal breath sounds.   Abdominal:      General: Abdomen is flat.      Palpations: Abdomen is soft.   Musculoskeletal:         General: Tenderness present. Normal range of motion.      Comments: Left upper extremity: Left shoulder swelling, increased mild swelling and tenderness shoulder and left arm.  Restricted range of motion due to pain.  Radial pulse of left upper extremity 2+.  Sensation intact to light touch.   Skin:     Coloration: Skin is pale.   Neurological:      Mental Status: He is alert and oriented to person, place, and time.   Psychiatric:         Behavior: Behavior normal.         Diagnostic Data:  Lab Results (last 24 hours)       Procedure Component Value Units Date/Time    CK [[id number]]  (Normal)  Collected: 02/13/24 1331    Specimen: Blood Updated: 02/13/24 1524     Creatine Kinase 77 U/L     Blood Culture - Blood, Arm, Left [[id number]] Collected: 02/13/24 1505    Specimen: Blood from Arm, Left Updated: 02/13/24 1509    Blood Culture - Blood, Hand, Right [[id number]] Collected: 02/13/24 1505    Specimen: Blood from Hand, Right Updated: 02/13/24 1508    Extra Tubes [[id number]] Collected: 02/13/24 1331    Specimen: Blood, Venous Line Updated: 02/13/24 1446    Narrative:      The following orders were created for panel order Extra Tubes.  Procedure                               Abnormality         Status                     ---------                               -----------         ------                     Gold Top - SST[[id number]]                                   Final result               Light Blue Top[[id number]]                                   Final result                 Please view results for these tests on the individual orders.    Gold Top - SST [[id number]] Collected: 02/13/24 1331    Specimen: Blood Updated: 02/13/24 1446     Extra Tube Hold for add-ons.     Comment: Auto resulted.       Light Blue Top [[id number]] Collected: 02/13/24 1331    Specimen: Blood Updated: 02/13/24 1446     Extra Tube Hold for add-ons.     Comment: Auto resulted       Basic Metabolic Panel [[id number]]  (Abnormal) Collected: 02/13/24 1331    Specimen: Blood Updated: 02/13/24 1422     Glucose 156 mg/dL      BUN 38 mg/dL      Creatinine 2.65 mg/dL      Sodium 143 mmol/L      Potassium 5.5 mmol/L      Comment: Slight hemolysis detected by analyzer. Result may be falsely elevated.        Chloride 109 mmol/L      CO2 22.0 mmol/L      Calcium 9.0 mg/dL      BUN/Creatinine Ratio 14.3     Anion Gap 12.0 mmol/L      eGFR 25.5 mL/min/1.73     Narrative:      GFR Normal >60  Chronic Kidney Disease <60  Kidney Failure <15      CBC & Differential [[id number]]  (Abnormal) Collected: 02/13/24 1331    Specimen: Blood Updated: 02/13/24  135    Narrative:      The following orders were created for panel order CBC & Differential.  Procedure                               Abnormality         Status                     ---------                               -----------         ------                     CBC Auto Differential[[id number]]        Abnormal            Final result               Scan Slide[[id number]]                                       Final result                 Please view results for these tests on the individual orders.    CBC Auto Differential [[id number]]  (Abnormal) Collected: 02/13/24 1331    Specimen: Blood Updated: 02/13/24 1359     WBC 22.20 10*3/mm3      RBC 4.40 10*6/mm3      Hemoglobin 12.7 g/dL      Hematocrit 38.4 %      MCV 87.2 fL      MCH 28.9 pg      MCHC 33.2 g/dL      RDW 14.5 %      RDW-SD 44.2 fl      MPV 9.8 fL      Platelets 253 10*3/mm3     Narrative:      The previously reported component NRBC is no longer being reported. Previous result was 0.0 /100 WBC (Reference Range: 0.0-0.2 /100 WBC) on 2/13/2024 at 1342 EST.    Scan Slide [[id number]] Collected: 02/13/24 1331    Specimen: Blood Updated: 02/13/24 1359     Scan Slide --     Comment: See Manual Differential Results       Manual Differential [[id number]]  (Abnormal) Collected: 02/13/24 1331    Specimen: Blood Updated: 02/13/24 1359     Neutrophil % 70.0 %      Lymphocyte % 21.0 %      Monocyte % 2.0 %      Basophil % 1.0 %      Bands %  6.0 %      Neutrophils Absolute 16.87 10*3/mm3      Lymphocytes Absolute 4.66 10*3/mm3      Monocytes Absolute 0.44 10*3/mm3      Basophils Absolute 0.22 10*3/mm3      Anisocytosis Slight/1+     WBC Morphology Normal     Large Platelets Slight/1+             Imaging Results (Last 24 Hours)       Procedure Component Value Units Date/Time    CT Abdomen Pelvis Without Contrast [[id number]] Collected: 02/13/24 1607     Updated: 02/13/24 1614    Narrative:      CT ABDOMEN PELVIS WO CONTRAST    Date of Exam: 2/13/2024 3:53 PM  EST    Indication: Leukocytosis, renal insufficiency.    Comparison: None available.    Technique: Axial CT images were obtained of the abdomen and pelvis without the administration of contrast. Sagittal and coronal reconstructions were performed.  Automated exposure control and iterative reconstruction methods were used.      Findings:    Lung Bases:  The visualized lung bases and lower mediastinal structures are unremarkable.    Peritoneum:  No free intraperitoneal air or fluid.     Abdominal wall:  Unremarkable.    Liver:  Liver is normal in size and contour. No focal lesions.    Biliary/Gallbladder:  Small gallstone is visualized posteriorly measuring 1.1 cm. The gallbladder appears mildly distended. No pericholecystic inflammatory changes are visualized. The biliary tree is nondilated.    Pancreas:  Pancreas is within normal limits. There is no evidence of pancreatic mass or peripancreatic inflammatory changes.    Spleen:  Spleen is normal in size and contour.    Gastrointestinal/Mesentery:   No evidence of obstruction or gross inflammatory changes. The appendix appears within normal limits. No pericolonic inflammatory changes are visualized.    Adrenals:  Adrenal glands are unremarkable.    Kidneys:  The kidneys are in anatomic position. No evidence of nephrolithiasis. No evidence of hydronephrosis or perinephric fat stranding.    Bladder:   The urinary bladder is unremarkable.    Reproductive organs:    The prostate is markedly enlarged measuring 7 x 7.6 x 8 cm.    Lymph Nodes:  No significant adenopathy is identified.     Vasculature:  Moderate aortoiliac atheromatous changes. Mild ectasia of the bilateral internal iliac arteries. The aorta is normal in caliber.    Bony Structures:    No acute fracture or aggressive lesions. Mild chronic loss of height at T11 and T12 vertebral bodies.          Impression:      Impression:  Cholelithiasis. Mild gallbladder distention. This may be secondary to prolonged  fasting state. If there is concern for cholecystitis, correlate with ultrasound and/or HIDA.    Marked prostatomegaly.            Electronically Signed: Jordan Ortiz MD    2/13/2024 4:12 PM EST    Workstation ID: [id number]    CT Head Without Contrast [[id number]] Collected: 02/13/24 1605     Updated: 02/13/24 1610    Narrative:      CT HEAD WO CONTRAST    Date of Exam: 2/13/2024 3:53 PM EST    Indication: fall.    Comparison: August 21, 2018    Technique: Axial CT images were obtained of the head without contrast administration.  Coronal reconstructions were performed.  Automated exposure control and iterative reconstruction methods were used.      Findings:  There is evidence for an old insult in the right occipital/ temporal area. There is ex vacuo dilatation right lateral ventricle. There is evidence for old insult right cerebellar hemisphere which has occurred since prior study. There are periventricular   and deep white matter changes which could reflect more chronic small vessel ischemic change. There is global atrophy. There is no acute orbital abnormality. The paranasal sinuses reveal some mucosal thickening in the right ethmoid sinus. Mastoids are   clear.      Impression:      Impression:  1.Old insult to the right of subdural/temporal area  2.Interval insult right cerebellum that appears more chronic  3.Diffuse white matter changes involving the cerebral hemispheres which could reflect more chronic small vessel ischemic change.  4.Global atrophy  5.Minimal right ethmoid mucosal thickening.      Electronically Signed: Dinh Freire MD    2/13/2024 4:08 PM EST    Workstation ID: [id number]    XR Chest 1 View [[id number]] Collected: 02/13/24 1407     Updated: 02/13/24 1410    Narrative:      XR CHEST 1 VW    Date of Exam: 2/13/2024 1:50 PM EST    Indication: fracture, fall    Comparison: 8/21/2019.    Findings:  Heart and pulmonary vessels are within normal limits for AP portable supine technique. There is  somewhat low lung volumes. No acute infiltrates or effusions are detected. There is no pneumothorax.      Impression:      Impression:  Somewhat limited study. No acute process identified.      Electronically Signed: Amanda Mccarthy MD    2/13/2024 2:08 PM EST    Workstation ID: [id number]    XR Shoulder 2+ View Left [[id number]] Collected: 02/13/24 1406     Updated: 02/13/24 1410    Narrative:      XR SHOULDER 2+ VW LEFT    Date of Exam: 2/13/2024 1:50 PM EST    Indication: fall    Comparison: None available.    Findings:  There is a comminuted mildly displaced fracture of the humeral neck with impaction and varus angulation of the distal fracture fragment. There is involvement of the greater and lesser tuberosities. Mild to moderate glenohumeral and acromioclavicular   joint degenerative change. No suspicious osseous lesion. Soft tissues are unremarkable.      Impression:      Impression:  Comminuted mildly displaced left humeral neck fracture with impaction and varus angulation of the distal fracture fragment, as well as extension to the tuberosities.      Electronically Signed: Garrett Jarvis MD    2/13/2024 2:07 PM EST    Workstation ID: [id number]              Assessment & Plan        This is a 68 y.o. male with:    Active and Resolved Problems  Active Hospital Problems    Diagnosis  POA    **Left humeral fracture [S42.302A]  Yes    Leukocytosis [D72.829]  Unknown    DYLON (acute kidney injury) [N17.9]  Yes    Unsteady gait [R26.81]  Yes      Resolved Hospital Problems   No resolved problems to display.     68 years old male S/P fall with mildly displaced left humeral neck fracture  X ray left shoulder on admission showing comminuted displaced left humeral neck fracture.   Sling placed in ED  Tylenol 650 mg q 6 for mild pain  Morphine 4 mg q 4 as needed for severe pain  PT/OT evaluation  Fall precautions  Frequent neurovascular checks  Orthopedic evaluation    DYLON, likely prerenal superimposed on CKD stage  3  Hyperkalemia  Creatinine on admission 2.6(baseline 1.7) on 6/3/21  S/P lokelma 10 g in ED  Start  ml/hr, reassess rate in am.   Avoid nephrotoxic medications      History of type 2 DM  Start lantus 25 units HS plus correction scale    Hypertension  Norvasc 10 mg once daily  Holding losartan given DYLON  Metoprolol 50 mg once daily    History of CVA  Continue antiplatelets and statin    History of Hypothyroidism  Continue Synthroid 25 mcg once daily    History of seizure  Continue Keppra    Leukocytosis  No clear source of infection at present.  Abdominal exam benign   CXR does not show consolidations.  Checking UA  Monitor for fever  Given one dose ceftriaxone in ED    DVT prophylaxis:  Medical DVT prophylaxis orders are present.        The patient desires to be as follows:    CODE STATUS:         Full code      Admission Status:  I believe this patient meets inpatient status.    Expected Length of Stay: 2    PDMP and Medication Dispenses via Sidebar reviewed and consistent with patient reported medications.    I discussed the patient's findings and my recommendations with patient.      Signature:     This document has been electronically signed by Carlos Llanes Alvarez, MD on February 13, 2024 19:36 [facility] Hospitalist Team

## 2024-02-14 NOTE — PLAN OF CARE
Goal Outcome Evaluation:  Plan of Care Reviewed With: patient           Outcome Evaluation: 69 yo male admitted from LTC faciltiy after fall OOB.  Pt with c/o L shoulder pain and found to have L humerus fx.  Ortho consulted and plans non op tx in sling/swathe.  Pt is R handed, has A for ADLs but states he walks without an AD in facility.  This date, pt painful with movement and requires min/mod A x2 for bed mobility and standing EOB.  Will follow pt 3x/week and reocmmend skilled therapy services upon return to LTC facility.      Anticipated Discharge Disposition (PT): skilled nursing facility

## 2024-02-14 NOTE — PLAN OF CARE
Goal Outcome Evaluation:  Plan of Care Reviewed With: patient        Progress: no change  Outcome Evaluation: 69 yo male admitted from LTC faciltiy after fall OOB.  Pt with c/o L shoulder pain and found to have L humerus fx.  Ortho consulted and plans non op tx in sling/swathe.  Pt is R handed, has A for ADLs but states he walks without an AD in facility.  Pt. completes bed mobility and sit to stand transfers this date w/ mod A to complete, A x 2 sit to stand from EOB w/ limited standing tolerance secondary to fatigue and increased pain. Pt. provided complete assist for all LB ADLs this date, A to manage sling for comfortability. Pt. will require SNF placement at d/c to address aforementioned deficits and facilitate return to PLOF.      Anticipated Discharge Disposition (OT): skilled nursing facility

## 2024-02-14 NOTE — CONSULTS
ORTHOPEDIC SURGERY CONSULT      Patient: Isaias Molina  Date of Admission: 2/13/2024  1:13 PM  YOB: 1955  Medical Record Number: [id number]  Attending Physician: Jordan Cherry MD  Consulting Physician: Ryley Herrera MD    CHIEF COMPLAINT: Left shoulder pain    HISTORY OF PRESENT ILLNESS: Patient is a 68 y.o. year old male presents to [facility] with above complaints.  I was consulted for further evaluation and treatment.  He reports he fell out of bed 2 days ago.  He lives in a assisted living facility.  He is unable to lift his left arm due to pain and disability.  He is right-hand dominant.  He was brought to [facility], [place], operated by [facility] emergency room where x-rays were obtained.  This demonstrated a comminuted mildly displaced left proximal humerus fracture in varus malalignment.  Orthopedics was consulted for definitive management    ALLERGIES:   Allergies   Allergen Reactions    Shellfish Allergy Nausea And Vomiting       HOME MEDICATIONS:  (Not in a hospital admission)      CURRENT MEDICATIONS:  Scheduled Meds:amLODIPine, 10 mg, Oral, Daily  aspirin, 81 mg, Oral, Daily  atorvastatin, 80 mg, Oral, Daily  cloNIDine, 0.2 mg, Oral, BID  clopidogrel, 75 mg, Oral, Daily  enoxaparin, 40 mg, Subcutaneous, Daily  escitalopram, 10 mg, Oral, Daily  insulin glargine, 25 Units, Subcutaneous, Nightly  insulin lispro, 2-7 Units, Subcutaneous, 4x Daily AC & at Bedtime  levETIRAcetam, 500 mg, Oral, BID  levothyroxine, 25 mcg, Oral, Q AM  [Held by provider] losartan, 100 mg, Oral, Q24H  metoprolol succinate XL, 50 mg, Oral, Q24H  sodium chloride, 10 mL, Intravenous, Q12H  tamsulosin, 0.4 mg, Oral, BID      Continuous Infusions:lactated ringers, 100 mL/hr, Last Rate: 100 mL/hr (02/13/24 2013)      PRN Meds:.  acetaminophen    ammonium lactate    senna-docusate sodium **AND** polyethylene glycol **AND** bisacodyl **AND** bisacodyl    dextrose    dextrose    glucagon (human recombinant)     Morphine    [COMPLETED] Insert Peripheral IV **AND** sodium chloride    sodium chloride    sodium chloride    Past Medical History:   Diagnosis Date    Cellulitis 10/15/2013    Encounter for screening for malignant neoplasm of prostate 12/4/2017    Encounter for screening for other disorder 12/4/2017    Need for prophylactic vaccination against Streptococcus pneumoniae (pneumococcus) 10/19/2018    Onychomycosis 10/19/2018    Pain in right shoulder 10/3/2018    TIA (transient ischemic attack) 01/05/2020     Past Surgical History:   Procedure Laterality Date    CARDIAC CATHETERIZATION N/A 6/9/2021    Procedure: Left Heart Cath and coronary angiogram;  Surgeon: Eligio Robles MD;  Location: [facility] CATH INVASIVE LOCATION;  Service: Cardiovascular;  Laterality: N/A;    CARDIAC CATHETERIZATION N/A 6/11/2021    Procedure: Percutaneous Coronary Intervention;  Surgeon: Eligio Robles MD;  Location: [facility] CATH INVASIVE LOCATION;  Service: Cardiovascular;  Laterality: N/A;   PTCA- LAD    CARDIAC CATHETERIZATION N/A 6/11/2021    Procedure: Stent TEJAL coronary;  Surgeon: Eligio Robles MD;  Location: [facility] CATH INVASIVE LOCATION;  Service: Cardiovascular;  Laterality: N/A;   LAD    CATARACT EXTRACTION WITH INTRAOCULAR LENS IMPLANT Bilateral     CORONARY ANGIOPLASTY WITH STENT PLACEMENT  2011    PTCA - LCx & RCA with stent    ROTATOR CUFF REPAIR Right     TOTAL SHOULDER REVERSE ARTHROPLASTY Right     after fracture 2018     Social History     Occupational History    Not on file   Tobacco Use    Smoking status: Never    Smokeless tobacco: Never   Substance and Sexual Activity    Alcohol use: Never    Drug use: Never    Sexual activity: Defer      Social History     Social History Narrative    Not on file     Family History   Problem Relation Age of Onset    Cerebral aneurysm Father     Peripheral vascular disease Brother     Diabetes type II Brother     Nephrotic syndrome Grandson     Nephrotic syndrome Granddaughter         REVIEW OF SYSTEMS:    HEENT: Patient denies any headaches, vision changes, change in hearing, or tinnitus, Patient denies epistaxis, sinus pain, hoarseness, or dysphagia   Pulmonary: Patient denies any cough, congestion, acute change in SOA or wheezing.   Cardiovascular: Patient denies any change in chest pain, dyspnea, palpitations, weakness, intolerance of exercise, varicosities, change in murmur   Gastrointestinal:  Patient denies change in appetite, melena, change in bowel habits.   Genital/Urinary: Patient denies dysuria, change in color of urine, change in frequency of urination, pain with urgency, change in incontinence, retention.   Musculoskeletal: Patient denies complaints of acute changes in symptoms of other joints not mentioned above.   Neurological: Patient denies changes in dizziness, tremor, ataxia, or difficulty in speaking or changes in memory.   Endocrine system: Patient denies acute changes in tremors, palpitations, polyuria, polydipsia, polyphagia, diaphoresis, exophthalmos, or goiter.   Psychological: Patient denies thoughts/plans or harming self or other; denies acute changes in depression,  insomnia, night terrors, west, disorientation.   Skin: Patient denies any bruising, rashes, discoloration, pruritus,or wounds not mentioned in history of present illness or chief complaint above.   Hematopoietic: Patient denies current bleeding, epistaxis, hematuria, or melena.    PHYSICAL EXAM:   Vitals:  Vitals:    02/13/24 2200 02/13/24 2225 02/13/24 2300 02/14/24 0012   BP:  129/68  137/73   BP Location:    Right arm   Patient Position:    Lying   Pulse: 67 66 65    Resp:    12   Temp:    97.4 °F (36.3 °C)   TempSrc:    Oral   SpO2: 91% 91% 92%    Weight:       Height:           General:  68 y.o. male who appears about stated age.    Alert, cooperative, in no acute distress         Head:    Normocephalic, without obvious abnormality, atraumatic   Eyes:            Lids and lashes normal,  conjunctivae and sclerae normal, no         icterus, no pallor, corneas clear, PERRLA   Ears:    Ears appear intact with no abnormalities noted   Throat:   No oral lesions, no thrush, oral mucosa moist   Neck:   No adenopathy, supple, trachea midline, no JVD   Back:     Limited exam shows no severe kyphosis present,no visible           erythema, no excessive  tenderness to palpation.    Lungs:     Respirations regular, even and unlabored.     Heart:    Normal rate, Pulses palpable   Chest Wall:    No abnormalities observed.   Abdomen:     Normal bowel sounds, no masses, no organomegaly, soft              non-tender, non-distended, no guarding, no rebound                      tenderness   Rectal:     Deferred   Pulses:   Pulses palpable and equal bilaterally   Skin:   No bleeding, bruising or rash   Lymph nodes:   No palpable adenopathy   Extremities:     Left shoulder: Skin is intact.  It is in the sling.  He is neurovascular intact distally to deltoid radial ulnar medial distribution.  Sensations intact to light touch.  He has brisk cap refill distally.  Painful range of motion left glenohumeral joint.    DIAGNOSTIC TEST:  Admission on 02/13/2024   Component Date Value Ref Range Status    Glucose 02/13/2024 156 (H)  65 - 99 mg/dL Final    BUN 02/13/2024 38 (H)  8 - 23 mg/dL Final    Creatinine 02/13/2024 2.65 (H)  0.76 - 1.27 mg/dL Final    Sodium 02/13/2024 143  136 - 145 mmol/L Final    Potassium 02/13/2024 5.5 (H)  3.5 - 5.2 mmol/L Final    Slight hemolysis detected by analyzer. Result may be falsely elevated.    Chloride 02/13/2024 109 (H)  98 - 107 mmol/L Final    CO2 02/13/2024 22.0  22.0 - 29.0 mmol/L Final    Calcium 02/13/2024 9.0  8.6 - 10.5 mg/dL Final    BUN/Creatinine Ratio 02/13/2024 14.3  7.0 - 25.0 Final    Anion Gap 02/13/2024 12.0  5.0 - 15.0 mmol/L Final    eGFR 02/13/2024 25.5 (L)  >60.0 mL/min/1.73 Final    WBC 02/13/2024 22.20 (H)  3.40 - 10.80 10*3/mm3 Final    RBC 02/13/2024 4.40  4.14 -  5.80 10*6/mm3 Final    Hemoglobin 02/13/2024 12.7 (L)  13.0 - 17.7 g/dL Final    Hematocrit 02/13/2024 38.4  37.5 - 51.0 % Final    MCV 02/13/2024 87.2  79.0 - 97.0 fL Final    MCH 02/13/2024 28.9  26.6 - 33.0 pg Final    MCHC 02/13/2024 33.2  31.5 - 35.7 g/dL Final    RDW 02/13/2024 14.5  12.3 - 15.4 % Final    RDW-SD 02/13/2024 44.2  37.0 - 54.0 fl Final    MPV 02/13/2024 9.8  6.0 - 12.0 fL Final    Platelets 02/13/2024 253  140 - 450 10*3/mm3 Final    Extra Tube 02/13/2024 Hold for add-ons.   Final    Auto resulted.    Extra Tube 02/13/2024 Hold for add-ons.   Final    Auto resulted    Scan Slide 02/13/2024    Final    See Manual Differential Results    Neutrophil % 02/13/2024 70.0  42.7 - 76.0 % Final    Lymphocyte % 02/13/2024 21.0  19.6 - 45.3 % Final    Monocyte % 02/13/2024 2.0 (L)  5.0 - 12.0 % Final    Basophil % 02/13/2024 1.0  0.0 - 1.5 % Final    Bands %  02/13/2024 6.0 (H)  0.0 - 5.0 % Final    Neutrophils Absolute 02/13/2024 16.87 (H)  1.70 - 7.00 10*3/mm3 Final    Lymphocytes Absolute 02/13/2024 4.66 (H)  0.70 - 3.10 10*3/mm3 Final    Monocytes Absolute 02/13/2024 0.44  0.10 - 0.90 10*3/mm3 Final    Basophils Absolute 02/13/2024 0.22 (H)  0.00 - 0.20 10*3/mm3 Final    Anisocytosis 02/13/2024 Slight/1+  None Seen Final    WBC Morphology 02/13/2024 Normal  Normal Final    Large Platelets 02/13/2024 Slight/1+  None Seen Final    Creatine Kinase 02/13/2024 77  20 - 200 U/L Final    Glucose 02/14/2024 139 (H)  65 - 99 mg/dL Final    BUN 02/14/2024 35 (H)  8 - 23 mg/dL Final    Creatinine 02/14/2024 2.30 (H)  0.76 - 1.27 mg/dL Final    Sodium 02/14/2024 138  136 - 145 mmol/L Final    Potassium 02/14/2024 4.9  3.5 - 5.2 mmol/L Final    Chloride 02/14/2024 108 (H)  98 - 107 mmol/L Final    CO2 02/14/2024 17.0 (L)  22.0 - 29.0 mmol/L Final    Calcium 02/14/2024 7.5 (L)  8.6 - 10.5 mg/dL Final    BUN/Creatinine Ratio 02/14/2024 15.2  7.0 - 25.0 Final    Anion Gap 02/14/2024 13.0  5.0 - 15.0 mmol/L Final     eGFR 02/14/2024 30.2 (L)  >60.0 mL/min/1.73 Final    Glucose 02/13/2024 202 (H)  70 - 105 mg/dL Final    Serial Number: [id number]:  [id number]    Magnesium 02/14/2024 1.6  1.6 - 2.4 mg/dL Final    WBC 02/14/2024 12.20 (H)  3.40 - 10.80 10*3/mm3 Final    RBC 02/14/2024 3.21 (L)  4.14 - 5.80 10*6/mm3 Final    Hemoglobin 02/14/2024 9.6 (L)  13.0 - 17.7 g/dL Final    Hematocrit 02/14/2024 28.5 (L)  37.5 - 51.0 % Final    MCV 02/14/2024 88.9  79.0 - 97.0 fL Final    MCH 02/14/2024 30.0  26.6 - 33.0 pg Final    MCHC 02/14/2024 33.7  31.5 - 35.7 g/dL Final    RDW 02/14/2024 15.0  12.3 - 15.4 % Final    RDW-SD 02/14/2024 46.8  37.0 - 54.0 fl Final    MPV 02/14/2024 9.4  6.0 - 12.0 fL Final    Platelets 02/14/2024 167  140 - 450 10*3/mm3 Final    Scan Slide 02/14/2024    Final    See Manual Differential Results    Neutrophil % 02/14/2024 65.0  42.7 - 76.0 % Final    Lymphocyte % 02/14/2024 28.0  19.6 - 45.3 % Final    Monocyte % 02/14/2024 2.0 (L)  5.0 - 12.0 % Final    Eosinophil % 02/14/2024 2.0  0.3 - 6.2 % Final    Bands %  02/14/2024 2.0  0.0 - 5.0 % Final    Myelocyte % 02/14/2024 1.0 (H)  0.0 - 0.0 % Final    Neutrophils Absolute 02/14/2024 8.17 (H)  1.70 - 7.00 10*3/mm3 Final    Lymphocytes Absolute 02/14/2024 3.42 (H)  0.70 - 3.10 10*3/mm3 Final    Monocytes Absolute 02/14/2024 0.24  0.10 - 0.90 10*3/mm3 Final    Eosinophils Absolute 02/14/2024 0.24  0.00 - 0.40 10*3/mm3 Final    Anisocytosis 02/14/2024 Slight/1+  None Seen Final    WBC Morphology 02/14/2024 Normal  Normal Final    Platelet Estimate 02/14/2024 Adequate  Normal Final       Narrative & Impression   XR SHOULDER 2+ VW LEFT     Date of Exam: 2/13/2024 1:50 PM EST     Indication: fall     Comparison: None available.     Findings:  There is a comminuted mildly displaced fracture of the humeral neck with impaction and varus angulation of the distal fracture fragment. There is involvement of the greater and lesser tuberosities. Mild to moderate  glenohumeral and acromioclavicular   joint degenerative change. No suspicious osseous lesion. Soft tissues are unremarkable.     IMPRESSION:  Impression:  Comminuted mildly displaced left humeral neck fracture with impaction and varus angulation of the distal fracture fragment, as well as extension to the tuberosities.        Electronically Signed: Garrett Jarvis MD    2/13/2024 2:07 PM EST        ASSESSMENT:  Left proximal humerus fracture, acceptable alignment      Left humeral fracture    Unsteady gait    DYLON (acute kidney injury)    Leukocytosis      PLAN:    Recommend conservative treatment for his fracture.  Continue with the sling and swath.  He will follow-up in the office in 7 to 10 days for repeat x-rays.  Discussed will take likely up to 5 weeks before the pain starts to settle down.  He may come out of the sling and straighten his elbow couple times a day to help with elbow stiffness and help prevent cubital tunnel syndrome.    The above diagnosis and treatment plan was discussed with the patient.  They were educated in treatment options for their condition.   They were given the opportunity to ask questions and were answered to their satisfaction.  They agreed to proceed with the above treatment plan.      Call if questions.  Will sign off.      Ryley Herrera MD  Date: 2/14/2024

## 2024-02-14 NOTE — CASE MANAGEMENT/SOCIAL WORK
Continued Stay Note   [facility]     Patient Name: Isaias Molina  MRN: [id number]  Today's Date: 2/14/2024    Admit Date: 2/13/2024    Plan: Return to [facility]. No precert or PASRR needed   Discharge Plan       Row Name 02/14/24 1013       Plan    Plan Return to [facility]. No precert or PASRR needed    Plan Comments Confirmed with [facility] liasion that patient can return to facility at [place]. No precert/PASRR needed. DC Barriers: Nephrology consult; IV antibiotics, IV fluids                   Discharge Codes    No documentation.                   Riddhi Garcia RN, [facility]  Office: [phone]  Fax: [phone]  [email or web address]      Phone communication or documentation only - no physical contact with patient or family.    Riddhi Garcia RN

## 2024-02-14 NOTE — THERAPY EVALUATION
Patient Name: Isaias Molina  : 1955    MRN: [id number]                              Today's Date: 2024       Admit Date: 2024    Visit Dx:     ICD-10-CM ICD-9-CM   1. Other closed nondisplaced fracture of proximal end of left humerus, initial encounter  S42.295A 812.09   2. Fall, initial encounter  W19.XXXA E888.9   3. Leukocytosis, unspecified type  D72.829 288.60     Patient Active Problem List   Diagnosis    Unsteady gait    Benign essential hypertension    Cerebrovascular accident (CVA)    Coronary artery disease    Homonymous hemianopia    Mixed hyperlipidemia    Obesity    Resting tremor    Tremor, essential    Type 2 diabetes mellitus    Vitamin B12 deficiency    Episode of recurrent major depressive disorder    Elevated PSA    Acquired hypothyroidism    Seizure as late effect of cerebrovascular accident (CVA)    Diabetic retinopathy    DYLON (acute kidney injury)    Abnormal nuclear stress test    Chronic kidney disease, stage 3b    Non-ST elevation MI (NSTEMI)    Left humeral fracture    Leukocytosis     Past Medical History:   Diagnosis Date    Cellulitis 10/15/2013    Encounter for screening for malignant neoplasm of prostate 2017    Encounter for screening for other disorder 2017    Need for prophylactic vaccination against Streptococcus pneumoniae (pneumococcus) 10/19/2018    Onychomycosis 10/19/2018    Pain in right shoulder 10/03/2018    Stroke     TIA (transient ischemic attack) 2020     Past Surgical History:   Procedure Laterality Date    CARDIAC CATHETERIZATION N/A 2021    Procedure: Left Heart Cath and coronary angiogram;  Surgeon: Eligio Robles MD;  Location: [facility] CATH INVASIVE LOCATION;  Service: Cardiovascular;  Laterality: N/A;    CARDIAC CATHETERIZATION N/A 2021    Procedure: Percutaneous Coronary Intervention;  Surgeon: Eligio Robles MD;  Location: [facility] CATH INVASIVE LOCATION;  Service: Cardiovascular;  Laterality: N/A;   PTCA- LAD     CARDIAC CATHETERIZATION N/A 6/11/2021    Procedure: Stent TEJAL coronary;  Surgeon: Eligio Robles MD;  Location: [facility] CATH INVASIVE LOCATION;  Service: Cardiovascular;  Laterality: N/A;   LAD    CATARACT EXTRACTION WITH INTRAOCULAR LENS IMPLANT Bilateral     CORONARY ANGIOPLASTY WITH STENT PLACEMENT  2011    PTCA - LCx & RCA with stent    ROTATOR CUFF REPAIR Right     TOTAL SHOULDER REVERSE ARTHROPLASTY Right     after fracture 2018      General Information       [facility] Name 02/14/24 1347          Physical Therapy Time and Intention    Document Type evaluation  -     Mode of Treatment physical therapy  -       Row Name 02/14/24 1347          General Information    Patient Profile Reviewed yes  -     Prior Level of Function --  unknown, pt states she walks without AD, unsure what level of care he is in at [facility], states they help him dress, takes his own shower.  ? pt accuracy of report.   reports pt in LTC section at [facility]  -     Existing Precautions/Restrictions fall;brace on at all times  sling LUE, NWB LUE  -     Barriers to Rehab medically complex;previous functional deficit;cognitive status  -       Row Name 02/14/24 1347          Living Environment    People in Home facility resident  -       Row Name 02/14/24 1347          Cognition    Orientation Status (Cognition) oriented to;person;place;disoriented to;situation  -       Row Name 02/14/24 1347          Safety Issues, Functional Mobility    Safety Issues Affecting Function (Mobility) insight into deficits/self-awareness;judgment;problem-solving  -     Impairments Affecting Function (Mobility) balance;cognition;pain;strength;range of motion (ROM)  -               User Key  (r) = Recorded By, (t) = Taken By, (c) = Cosigned By      Initials Name Provider Type     Riddhi Acosta, PT Physical Therapist                   Mobility       Row Name 02/14/24 1340          Bed Mobility    Bed Mobility  scooting/bridging;supine-sit;sit-supine  -     Scooting/Bridging [place] (Bed Mobility) dependent (less than 25% patient effort)  -     Supine-Sit [place] (Bed Mobility) minimum assist (75% patient effort);moderate assist (50% patient effort)  -     Sit-Supine [place] (Bed Mobility) moderate assist (50% patient effort);2 person assist  -     Assistive Device (Bed Mobility) head of bed elevated;draw sheet  -       Row Name 02/14/24 1349          Sit-Stand Transfer    Sit-Stand [place] (Transfers) minimum assist (75% patient effort);2 person assist  -     Comment, (Sit-Stand Transfer) pt stood at EOB, side steps to HOB with min A x2  -[facility] Name 02/14/24 1349          Gait/Stairs (Locomotion)    [place] Level (Gait) not tested  -[facility] Name 02/14/24 1349          Mobility    Extremity Weight-bearing Status left upper extremity  -     Left Upper Extremity (Weight-bearing Status) non weight-bearing (NWB)  -               User Key  (r) = Recorded By, (t) = Taken By, (c) = Cosigned By      Initials Name Provider Type     Riddhi Acosta, PT Physical Therapist                   Obj/Interventions       [facility] Name 02/14/24 1350          Range of Motion Comprehensive    Comment, General Range of Motion BLE AROM WFLs.  LUE in sling, has active movement wrist/hand, pain with attempted elbow ROM  -[facility] Name 02/14/24 1350          Strength Comprehensive (MMT)    Comment, General Manual Muscle Testing (MMT) Assessment BLEs hips 3/5, other 4/5.  -       Row Name 02/14/24 1350          Balance    Balance Assessment sitting static balance;sitting dynamic balance;standing static balance  -     Static Sitting Balance standby assist  -     Dynamic Sitting Balance contact guard  -     Position, Sitting Balance sitting edge of bed  -     Static Standing Balance minimal assist;2-person assist  [facility]               User Key  (r) = Recorded By, (t) = Taken By, (c) = Cosigned  By      Initials Name Provider Type    Riddhi Engle, PT Physical Therapist                   Goals/Plan       Row Name 02/14/24 1351          Bed Mobility Goal 1 (PT)    Activity/Assistive Device (Bed Mobility Goal 1, PT) bed mobility activities, all  -     [place] Level/Cues Needed (Bed Mobility Goal 1, PT) minimum assist (75% or more patient effort)  -     Time Frame (Bed Mobility Goal 1, PT) 2 weeks  -[facility]       Row Name 02/14/24 1353          Transfer Goal 1 (PT)    Activity/Assistive Device (Transfer Goal 1, PT) bed-to-chair/chair-to-bed;sit-to-stand/stand-to-sit  -     [place] Level/Cues Needed (Transfer Goal 1, PT) minimum assist (75% or more patient effort)  -     Time Frame (Transfer Goal 1, PT) 2 weeks  -[facility]       Row Name 02/14/24 1353          Gait Training Goal 1 (PT)    Activity/Assistive Device (Gait Training Goal 1, PT) gait (walking locomotion);assistive device use;cane, quad  -     [place] Level (Gait Training Goal 1, PT) minimum assist (75% or more patient effort)  -     Distance (Gait Training Goal 1, PT) 50'  -     Time Frame (Gait Training Goal 1, PT) 2 weeks  -[facility]       Row Name 02/14/24 1383          Therapy Assessment/Plan (PT)    Planned Therapy Interventions (PT) balance training;bed mobility training;gait training;transfer training;strengthening;patient/family education  -               User Key  (r) = Recorded By, (t) = Taken By, (c) = Cosigned By      Initials Name Provider Type    Riddhi Engle, PT Physical Therapist                   Clinical Impression       Row Name 02/14/24 3201          Pain    Additional Documentation Pain Scale: FACES Pre/Post-Treatment (Group)  -[facility]       Row Name 02/14/24 8201          Pain Scale: FACES Pre/Post-Treatment    Pain: FACES Scale, Pretreatment 4-->hurts little more  -     Posttreatment Pain Rating 6-->hurts even more  -     Pain Location - Side/Orientation Left  -     Pain Location upper  -     Pain  Location - extremity;back  -       Row Name 02/14/24 1351          Plan of Care Review    Plan of Care Reviewed With patient  -     Outcome Evaluation 69 yo male admitted from LT faciltiy after fall OOB.  Pt with c/o L shoulder pain and found to have L humerus fx.  Ortho consulted and plans non op tx in sling/swathe.  Pt is R handed, has A for ADLs but states he walks without an AD in facility.  This date, pt painful with movement and requires min/mod A x2 for bed mobility and standing EOB.  Will follow pt 3x/week and reocmmend skilled therapy services upon return to LTC facility.  -       Row Name 02/14/24 1351          Therapy Assessment/Plan (PT)    Rehab Potential (PT) good, to achieve stated therapy goals  -     Criteria for Skilled Interventions Met (PT) yes;skilled treatment is necessary  -     Therapy Frequency (PT) 3 times/wk  -       Row Name 02/14/24 1351          Vital Signs    Pre Systolic BP Rehab 112  -     Pre Treatment Diastolic BP 66  -     Pretreatment Heart Rate (beats/min) 58  -     O2 Delivery Pre Treatment room air  -     O2 Delivery Intra Treatment room air  -     O2 Delivery Post Treatment room air  -       Row Name 02/14/24 1351          Positioning and Restraints    Pre-Treatment Position in bed  -     Post Treatment Position bed  -     In Bed notified nsg;call light within reach;encouraged to call for assist  -               User Key  (r) = Recorded By, (t) = Taken By, (c) = Cosigned By      Initials Name Provider Type     Riddhi Acosta, PT Physical Therapist                   Outcome Measures       Row Name 02/14/24 1354 02/14/24 0915       How much help from another person do you currently need...    Turning from your back to your side while in flat bed without using bedrails? 2  - 3  -JE    Moving from lying on back to sitting on the side of a flat bed without bedrails? 2  - 3  -JE    Moving to and from a bed to a chair (including a wheelchair)? 2   - 3  -    Standing up from a chair using your arms (e.g., wheelchair, bedside chair)? 2  - 3  -    Climbing 3-5 steps with a railing? 1  - 2  -    To walk in hospital room? 2  - 3  -JE    AM-PAC 6 Clicks Score (PT) 11  - 17  -    Highest Level of Mobility Goal 4 --> Transfer to chair/commode  - 5 --> Static standing  -              User Key  (r) = Recorded By, (t) = Taken By, (c) = Cosigned By      Initials Name Provider Type     Riddhi Acosta, PT Physical Therapist    Roxanne Landrum, RN Registered Nurse                                 Physical Therapy Education       Title: PT OT SLP Therapies (Done)       Topic: Physical Therapy (Done)       Point: Mobility training (Done)       Learning Progress Summary             Patient Acceptance, E,TB, VU by  at 2/14/2024 1354                         Point: Precautions (Done)       Learning Progress Summary             Patient Acceptance, E,TB, VU by  at 2/14/2024 1354                                         User Key       Initials Effective Dates Name Provider Type Discipline     06/16/21 -  Riddhi Acosta, PT Physical Therapist PT                  PT Recommendation and Plan  Planned Therapy Interventions (PT): balance training, bed mobility training, gait training, transfer training, strengthening, patient/family education  Plan of Care Reviewed With: patient  Outcome Evaluation: 69 yo male admitted from LTC faciltiy after fall OOB.  Pt with c/o L shoulder pain and found to have L humerus fx.  Ortho consulted and plans non op tx in sling/swathe.  Pt is R handed, has A for ADLs but states he walks without an AD in facility.  This date, pt painful with movement and requires min/mod A x2 for bed mobility and standing EOB.  Will follow pt 3x/week and reocmmend skilled therapy services upon return to LTC facility.     Time Calculation:         PT Charges       Row Name 02/14/24 3727             Time Calculation    Start Time 1030  -       Stop Time 1044  -      Time Calculation (min) 14 min  -      PT Received On 02/14/24  -      PT - Next Appointment 02/15/24  -      PT Goal Re-Cert Due Date 02/28/24  -         Time Calculation- PT    Total Timed Code Minutes- PT 0 minute(s)  -                User Key  (r) = Recorded By, (t) = Taken By, (c) = Cosigned By      Initials Name Provider Type     Riddhi Acosta, PT Physical Therapist                  Therapy Charges for Today       Code Description Service Date Service Provider Modifiers Qty    93872477954 HC PT EVAL MOD COMPLEXITY 3 2/14/2024 Riddhi Acosta, PT GP 1            PT G-Codes  AM-PAC 6 Clicks Score (PT): 11  PT Discharge Summary  Anticipated Discharge Disposition (PT): skilled nursing facility    Riddhi Acosta PT  2/14/2024

## 2024-02-14 NOTE — CASE MANAGEMENT/SOCIAL WORK
Continued Stay Note   Brendan     Patient Name: Isaias Molina  MRN: [id number]  Today's Date: 2/14/2024    Admit Date: 2/13/2024    Plan: Return to [facility]. No precert or PASRR needed   Discharge Plan       Row Name 02/14/24 1342       Plan    Plan Comments Notified by PT Riddhi that she recommends SNF for patient. I notified Maria Elena with [facility]. Pt is currently in long term care and rehab recommended upon return.She said that wouldn't be a problem.                   Discharge Codes    No documentation.                 Riddhi Garcia RN, [facility]  Office: [phone]  Fax: [phone]  [email or web address]      Phone communication or documentation only - no physical contact with patient or family.      Riddhi Garcia RN

## 2024-02-15 LAB
ANION GAP SERPL CALCULATED.3IONS-SCNC: 10 MMOL/L (ref 5–15)
BACTERIA SPEC AEROBE CULT: NO GROWTH
BASOPHILS # BLD AUTO: 0 10*3/MM3 (ref 0–0.2)
BASOPHILS NFR BLD AUTO: 0.4 % (ref 0–1.5)
BUN SERPL-MCNC: 43 MG/DL (ref 8–23)
BUN/CREAT SERPL: 18.2 (ref 7–25)
CALCIUM SPEC-SCNC: 8.2 MG/DL (ref 8.6–10.5)
CHLORIDE SERPL-SCNC: 106 MMOL/L (ref 98–107)
CO2 SERPL-SCNC: 20 MMOL/L (ref 22–29)
CREAT SERPL-MCNC: 2.36 MG/DL (ref 0.76–1.27)
DEPRECATED RDW RBC AUTO: 45.9 FL (ref 37–54)
EGFRCR SERPLBLD CKD-EPI 2021: 29.3 ML/MIN/1.73
EOSINOPHIL # BLD AUTO: 0.5 10*3/MM3 (ref 0–0.4)
EOSINOPHIL NFR BLD AUTO: 3.7 % (ref 0.3–6.2)
ERYTHROCYTE [DISTWIDTH] IN BLOOD BY AUTOMATED COUNT: 14.4 % (ref 12.3–15.4)
GLUCOSE BLDC GLUCOMTR-MCNC: 128 MG/DL (ref 70–105)
GLUCOSE BLDC GLUCOMTR-MCNC: 154 MG/DL (ref 70–105)
GLUCOSE BLDC GLUCOMTR-MCNC: 155 MG/DL (ref 70–105)
GLUCOSE BLDC GLUCOMTR-MCNC: 191 MG/DL (ref 70–105)
GLUCOSE SERPL-MCNC: 155 MG/DL (ref 65–99)
HCT VFR BLD AUTO: 30.4 % (ref 37.5–51)
HGB BLD-MCNC: 10.1 G/DL (ref 13–17.7)
LYMPHOCYTES # BLD AUTO: 2.1 10*3/MM3 (ref 0.7–3.1)
LYMPHOCYTES NFR BLD AUTO: 16 % (ref 19.6–45.3)
MAGNESIUM SERPL-MCNC: 1.7 MG/DL (ref 1.6–2.4)
MCH RBC QN AUTO: 28.3 PG (ref 26.6–33)
MCHC RBC AUTO-ENTMCNC: 33.2 G/DL (ref 31.5–35.7)
MCV RBC AUTO: 85.4 FL (ref 79–97)
MONOCYTES # BLD AUTO: 0.9 10*3/MM3 (ref 0.1–0.9)
MONOCYTES NFR BLD AUTO: 6.7 % (ref 5–12)
NEUTROPHILS NFR BLD AUTO: 73.2 % (ref 42.7–76)
NEUTROPHILS NFR BLD AUTO: 9.4 10*3/MM3 (ref 1.7–7)
NRBC BLD AUTO-RTO: 0 /100 WBC (ref 0–0.2)
PLATELET # BLD AUTO: 186 10*3/MM3 (ref 140–450)
PMV BLD AUTO: 9.9 FL (ref 6–12)
POTASSIUM SERPL-SCNC: 4.9 MMOL/L (ref 3.5–5.2)
RBC # BLD AUTO: 3.56 10*6/MM3 (ref 4.14–5.8)
SODIUM SERPL-SCNC: 136 MMOL/L (ref 136–145)
SODIUM UR-SCNC: 63 MMOL/L
WBC NRBC COR # BLD AUTO: 12.8 10*3/MM3 (ref 3.4–10.8)

## 2024-02-15 PROCEDURE — 25010000002 CEFTRIAXONE PER 250 MG: Performed by: INTERNAL MEDICINE

## 2024-02-15 PROCEDURE — 63710000001 INSULIN GLARGINE PER 5 UNITS: Performed by: STUDENT IN AN ORGANIZED HEALTH CARE EDUCATION/TRAINING PROGRAM

## 2024-02-15 PROCEDURE — 36415 COLL VENOUS BLD VENIPUNCTURE: CPT | Performed by: STUDENT IN AN ORGANIZED HEALTH CARE EDUCATION/TRAINING PROGRAM

## 2024-02-15 PROCEDURE — 96372 THER/PROPH/DIAG INJ SC/IM: CPT

## 2024-02-15 PROCEDURE — 82948 REAGENT STRIP/BLOOD GLUCOSE: CPT | Performed by: STUDENT IN AN ORGANIZED HEALTH CARE EDUCATION/TRAINING PROGRAM

## 2024-02-15 PROCEDURE — 85025 COMPLETE CBC W/AUTO DIFF WBC: CPT | Performed by: STUDENT IN AN ORGANIZED HEALTH CARE EDUCATION/TRAINING PROGRAM

## 2024-02-15 PROCEDURE — 25810000003 LACTATED RINGERS PER 1000 ML: Performed by: STUDENT IN AN ORGANIZED HEALTH CARE EDUCATION/TRAINING PROGRAM

## 2024-02-15 PROCEDURE — 96361 HYDRATE IV INFUSION ADD-ON: CPT

## 2024-02-15 PROCEDURE — 25010000002 ENOXAPARIN PER 10 MG: Performed by: STUDENT IN AN ORGANIZED HEALTH CARE EDUCATION/TRAINING PROGRAM

## 2024-02-15 PROCEDURE — 97530 THERAPEUTIC ACTIVITIES: CPT

## 2024-02-15 PROCEDURE — 96376 TX/PRO/DX INJ SAME DRUG ADON: CPT

## 2024-02-15 PROCEDURE — 80048 BASIC METABOLIC PNL TOTAL CA: CPT | Performed by: STUDENT IN AN ORGANIZED HEALTH CARE EDUCATION/TRAINING PROGRAM

## 2024-02-15 PROCEDURE — 82948 REAGENT STRIP/BLOOD GLUCOSE: CPT

## 2024-02-15 PROCEDURE — 25010000002 MORPHINE PER 10 MG

## 2024-02-15 PROCEDURE — 83735 ASSAY OF MAGNESIUM: CPT | Performed by: STUDENT IN AN ORGANIZED HEALTH CARE EDUCATION/TRAINING PROGRAM

## 2024-02-15 PROCEDURE — 97110 THERAPEUTIC EXERCISES: CPT

## 2024-02-15 PROCEDURE — 25010000002 MORPHINE PER 10 MG: Performed by: NURSE PRACTITIONER

## 2024-02-15 PROCEDURE — 84300 ASSAY OF URINE SODIUM: CPT | Performed by: INTERNAL MEDICINE

## 2024-02-15 RX ADMIN — METOPROLOL SUCCINATE 50 MG: 50 TABLET, EXTENDED RELEASE ORAL at 07:44

## 2024-02-15 RX ADMIN — TAMSULOSIN HYDROCHLORIDE 0.4 MG: 0.4 CAPSULE ORAL at 07:43

## 2024-02-15 RX ADMIN — ESCITALOPRAM OXALATE 10 MG: 10 TABLET ORAL at 07:44

## 2024-02-15 RX ADMIN — INSULIN GLARGINE 25 UNITS: 100 INJECTION, SOLUTION SUBCUTANEOUS at 21:03

## 2024-02-15 RX ADMIN — ASPIRIN 81 MG: 81 TABLET, COATED ORAL at 07:44

## 2024-02-15 RX ADMIN — CEFTRIAXONE 2000 MG: 2 INJECTION, POWDER, FOR SOLUTION INTRAMUSCULAR; INTRAVENOUS at 09:12

## 2024-02-15 RX ADMIN — CLONIDINE HYDROCHLORIDE 0.2 MG: 0.1 TABLET ORAL at 07:43

## 2024-02-15 RX ADMIN — CLONIDINE HYDROCHLORIDE 0.2 MG: 0.1 TABLET ORAL at 20:57

## 2024-02-15 RX ADMIN — TAMSULOSIN HYDROCHLORIDE 0.4 MG: 0.4 CAPSULE ORAL at 20:57

## 2024-02-15 RX ADMIN — MORPHINE SULFATE 4 MG: 4 INJECTION, SOLUTION INTRAMUSCULAR; INTRAVENOUS at 15:41

## 2024-02-15 RX ADMIN — ATORVASTATIN CALCIUM 80 MG: 40 TABLET, FILM COATED ORAL at 07:42

## 2024-02-15 RX ADMIN — LEVOTHYROXINE SODIUM 25 MCG: 0.03 TABLET ORAL at 05:19

## 2024-02-15 RX ADMIN — SODIUM CHLORIDE, POTASSIUM CHLORIDE, SODIUM LACTATE AND CALCIUM CHLORIDE 100 ML/HR: 600; 310; 30; 20 INJECTION, SOLUTION INTRAVENOUS at 20:58

## 2024-02-15 RX ADMIN — CLOPIDOGREL BISULFATE 75 MG: 75 TABLET ORAL at 07:43

## 2024-02-15 RX ADMIN — LEVETIRACETAM 500 MG: 500 TABLET, FILM COATED ORAL at 20:57

## 2024-02-15 RX ADMIN — AMLODIPINE BESYLATE 10 MG: 5 TABLET ORAL at 07:43

## 2024-02-15 RX ADMIN — ENOXAPARIN SODIUM 40 MG: 100 INJECTION SUBCUTANEOUS at 15:41

## 2024-02-15 RX ADMIN — LEVETIRACETAM 500 MG: 500 TABLET, FILM COATED ORAL at 07:43

## 2024-02-15 RX ADMIN — Medication 10 ML: at 07:44

## 2024-02-15 RX ADMIN — MORPHINE SULFATE 4 MG: 4 INJECTION, SOLUTION INTRAMUSCULAR; INTRAVENOUS at 22:24

## 2024-02-15 NOTE — CONSULTS
[facility] CONSULT NOTE    Referring Provider: Brigitte Nunes APRN   Reason for Consultation: Acute kidney injury chronic renal disease    Chief complaint.  Status post fall    History of present illness: Patient is 68 years old male well-known to me and has history of chronic kidney disease stage IIIb, hypertension, coronary artery disease, TIA, admitted to the hospital after fall at assisted living facility.  Patient fell on his left side, denies hitting his head.  Patient has been feeling tired for few days.  Denied any loss of consciousness, headache, fever, chest pain, nausea, vomiting, hematuria or dysuria.  Imaging showed left humeral neck fracture.  Patient's creatinine was around 2.7 yesterday from baseline of 1.7-1.8 Mg/DL.  Patient is receiving IV fluids.  Patient evaluated by orthopedic and recommended conservative treatment for his fracture    History  Past Medical History:   Diagnosis Date    Cellulitis 10/15/2013    Encounter for screening for malignant neoplasm of prostate 12/04/2017    Encounter for screening for other disorder 12/04/2017    Need for prophylactic vaccination against Streptococcus pneumoniae (pneumococcus) 10/19/2018    Onychomycosis 10/19/2018    Pain in right shoulder 10/03/2018    Stroke     TIA (transient ischemic attack) 01/05/2020     Past Surgical History:   Procedure Laterality Date    CARDIAC CATHETERIZATION N/A 6/9/2021    Procedure: Left Heart Cath and coronary angiogram;  Surgeon: Eligio Robles MD;  Location: [facility] CATH INVASIVE LOCATION;  Service: Cardiovascular;  Laterality: N/A;    CARDIAC CATHETERIZATION N/A 6/11/2021    Procedure: Percutaneous Coronary Intervention;  Surgeon: Eligio Robles MD;  Location: [facility] CATH INVASIVE LOCATION;  Service: Cardiovascular;  Laterality: N/A;   PTCA- LAD    CARDIAC CATHETERIZATION N/A 6/11/2021    Procedure: Stent TEJAL coronary;  Surgeon: Eligio Robles MD;  Location: [facility] CATH INVASIVE LOCATION;  Service:  Cardiovascular;  Laterality: N/A;   LAD    CATARACT EXTRACTION WITH INTRAOCULAR LENS IMPLANT Bilateral     CORONARY ANGIOPLASTY WITH STENT PLACEMENT  2011    PTCA - LCx & RCA with stent    ROTATOR CUFF REPAIR Right     TOTAL SHOULDER REVERSE ARTHROPLASTY Right     after fracture 2018     Social History     Tobacco Use    Smoking status: Never    Smokeless tobacco: Never   Vaping Use    Vaping Use: Never used   Substance Use Topics    Alcohol use: Never    Drug use: Never     Family History   Problem Relation Age of Onset    Cerebral aneurysm Father     Peripheral vascular disease Brother     Diabetes type II Brother     Nephrotic syndrome Grandson     Nephrotic syndrome Granddaughter        Review of Systems  ROS  As per HPI  Objective     Vital Signs  Temp:  [97.7 °F (36.5 °C)-99.2 °F (37.3 °C)] 99.2 °F (37.3 °C)  Heart Rate:  [66-76] 76  Resp:  [12-22] 17  BP: (105-151)/(68-81) 150/72    I/O this shift:  In: -   Out: 100 [Urine:100]  I/O last 3 completed shifts:  In: 1671 [P.O.:720; I.V.:951]  Out: 715 [Urine:715]    Physical Exam:  Physical Exam    General Appearance: alert, oriented x 3, no acute distress   Skin: warm and dry  HEENT: oral mucosa normal, nonicteric sclera  Neck: supple, no JVD  Lungs: CTA  Heart: RRR, normal S1 and S2  Abdomen: soft, nontender, nondistended  : no palpable bladder  Extremities: Left arm immobilized  Neuro: normal speech and mental status     Results Review:   I reviewed the patient's new clinical results.    Lab Results   Component Value Date    CALCIUM 8.1 (L) 02/14/2024    PHOS 4.2 06/12/2021     Results from last 7 days   Lab Units 02/14/24  1013 02/14/24  0312 02/13/24  1331   MAGNESIUM mg/dL 1.7 1.6  --    SODIUM mmol/L 139 138 143   POTASSIUM mmol/L 4.8 4.9 5.5*   CHLORIDE mmol/L 106 108* 109*   CO2 mmol/L 22.0 17.0* 22.0   BUN mg/dL 44* 35* 38*   CREATININE mg/dL 2.75* 2.30* 2.65*   GLUCOSE mg/dL 123* 139* 156*   CALCIUM mg/dL 8.1* 7.5* 9.0   WBC 10*3/mm3 13.00* 12.20*  22.20*   HEMOGLOBIN g/dL 10.2* 9.6* 12.7*   PLATELETS 10*3/mm3 173 167 253     Lab Results   Component Value Date    CKTOTAL 77 02/13/2024    TROPONINT 0.366 (C) 06/05/2021     Estimated Creatinine Clearance: 32.4 mL/min (A) (by C-G formula based on SCr of 2.75 mg/dL (H)).  Lab Results   Component Value Date    URICACID 7.4 08/23/2018       Brief Urine Lab Results  (Last result in the past 365 days)        Color   Clarity   Blood   Leuk Est   Nitrite   Protein   CREAT   Urine HCG        02/14/24 0636 Red  Comment: Any Substance that causes an abnormal urine color can alter the accuracy of the chemical reactions.   Cloudy   Large (3+)   --  Comment: The Leukoesterase test cannot be performed due to the centrifugation of the specimen.     Negative   Negative           02/14/24 0636 Red  Comment: Any Substance that causes an abnormal urine color can alter the accuracy of the chemical reactions.   Cloudy   Large (3+)   --  Comment: The Leukoesterase test cannot be performed due to the centrifugation of the specimen.     Negative   Negative                   Prior to Admission medications    Medication Sig Start Date End Date Taking? Authorizing Provider   amLODIPine (NORVASC) 10 MG tablet Take 1 tablet by mouth Daily. 6/14/21  Yes Caty Marroquin MD   aspirin 81 MG EC tablet Take 1 tablet by mouth Daily.   Yes ProviderHermelindo MD   atorvastatin (LIPITOR) 80 MG tablet Take 1 tablet by mouth Daily.   Yes ProviderHermelindo MD   cloNIDine (CATAPRES) 0.2 MG tablet Take 1 tablet by mouth 2 (Two) Times a Day.   Yes Hermelindo Romero MD   clopidogrel (PLAVIX) 75 MG tablet Take 1 tablet by mouth Daily. 2/3/20  Yes Marizol Puentes MD   Cyanocobalamin (B-12 Compliance Injection) 1000 MCG/ML kit Inject 1,000 mcg under the skin into the appropriate area as directed Every 30 (Thirty) Days. 6/13/21  Yes Caty Marroquin MD   docusate sodium (COLACE) 100 MG capsule Take 1 capsule by mouth Daily. 12/30/19  Yes Deloris  Marizol Banks MD   escitalopram (LEXAPRO) 10 MG tablet TAKE ONE TABLET BY MOUTH EVERY DAY 9/9/20  Yes Marizol Puentes MD   insulin aspart prot-insulin aspart (novoLOG 70/30) (70-30) 100 UNIT/ML injection Inject 30 Units under the skin into the appropriate area as directed 2 (Two) Times a Day.   Yes Hermelindo Romero MD   levETIRAcetam (Keppra) 500 MG tablet Take 1 tablet by mouth 2 (Two) Times a Day. 4/29/20  Yes Marizol Puentes MD   levothyroxine (SYNTHROID) 25 MCG tablet Take 1 tablet by mouth Daily. 1/20/20  Yes Marizol Puentes MD   losartan (COZAAR) 100 MG tablet Take 1 tablet by mouth Daily. 6/14/21  Yes Caty Marroquin MD   metoprolol succinate XL (TOPROL-XL) 50 MG 24 hr tablet Take 1 tablet by mouth Daily. 6/14/21  Yes Caty Marroquin MD   multivitamin with minerals tablet tablet Take 1 tablet by mouth Daily.   Yes Hermelindo Romero MD   polyethylene glycol (MiraLax) 17 g packet Take 17 g by mouth Daily As Needed.   Yes Hermelindo Romero MD   tamsulosin (FLOMAX) 0.4 MG capsule 24 hr capsule TAKE ONE CAPSULE BY MOUTH TWICE DAILY 4/23/21  Yes Marizol Puentes MD   vitamin D (ERGOCALCIFEROL) 1.25 MG (94956 UT) capsule capsule Take 1 capsule by mouth 1 (One) Time Per Week. On Saturday   Yes Hermelindo Romero MD   acetaminophen (TYLENOL) 325 MG tablet Take 2 tablets by mouth Every 4 (Four) Hours As Needed for Mild Pain  or Fever (temperature greater than 101F). 6/13/21   Caty Marroquin MD   ammonium lactate (LAC-HYDRIN) 12 % lotion Apply  topically to the appropriate area as directed 2 (Two) Times a Day As Needed for Dry Skin.    Hermelindo Romero MD   cephalexin (Keflex) 250 MG capsule Take 1 capsule by mouth 3 (Three) Times a Day. 2/14/24   Jordan Cherry MD   HYDROcodone-acetaminophen (Norco) 5-325 MG per tablet Take 1 tablet by mouth Every 6 (Six) Hours As Needed for Moderate Pain. 2/14/24   Jordan Cherry MD   nitroglycerin (NITROSTAT) 0.4 MG SL tablet Place 1  tablet under the tongue Every 5 (Five) Minutes As Needed for Chest Pain. Take no more than 3 doses in 15 minutes.    Provider, MD Hermelindo   traMADol (ULTRAM) 50 MG tablet Take 0.5 tablets by mouth Every 6 (Six) Hours As Needed for Moderate Pain.    Provider, MD Hermelindo       amLODIPine, 10 mg, Oral, Daily  aspirin, 81 mg, Oral, Daily  atorvastatin, 80 mg, Oral, Daily  cefTRIAXone, 2,000 mg, Intravenous, Q24H  cloNIDine, 0.2 mg, Oral, BID  clopidogrel, 75 mg, Oral, Daily  enoxaparin, 40 mg, Subcutaneous, Daily  escitalopram, 10 mg, Oral, Daily  insulin glargine, 25 Units, Subcutaneous, Nightly  insulin lispro, 2-7 Units, Subcutaneous, 4x Daily AC & at Bedtime  levETIRAcetam, 500 mg, Oral, BID  levothyroxine, 25 mcg, Oral, Q AM  [Held by provider] losartan, 100 mg, Oral, Q24H  metoprolol succinate XL, 50 mg, Oral, Q24H  sodium chloride, 10 mL, Intravenous, Q12H  tamsulosin, 0.4 mg, Oral, BID      lactated ringers, 100 mL/hr, Last Rate: 100 mL/hr (02/14/24 2114)        Assessment & Plan       Acute kidney injury.  Most likely prerenal in etiology.  Patient has some lower urinary tract symptoms.  Electrolytes okay.  Patient receiving IV fluids.  CT abdomen was negative for any obstructive changes  Chronic kidney disease stage IIIb.  Patient has underlying chronic kidney disease due to hypertensive nephrosclerosis with baseline creatinine around 1.7-1.8 Mg/DL  Left humeral fracture.  Evaluated by orthopedic  Hypertension with chronic kidney disease.  Blood pressure has been fairly well-controlled.  Losartan was held yesterday due to worsening renal function    Plan:  Continue IV fluids  I agree holding losartan  Follow urine electrolytes  Follow labs      I discussed the patients findings and my recommendations with patient and nursing staff    Sherman Will MD  02/15/24  08:40 EST

## 2024-02-15 NOTE — PLAN OF CARE
Goal Outcome Evaluation:   Pt a/o. Rom air. LR @ 100. Nephrology following. Up in chair this shift. Left sling in place. See MAR for pain control. Pt voiding on his own in urinal. No other complaints. Call light in reach.

## 2024-02-15 NOTE — PLAN OF CARE
Goal Outcome Evaluation:  Plan of Care Reviewed With: patient           Outcome Evaluation: Slept at intervals. PRN IV med given for left shoulder pain. IV fluids infusing. Nephrology to see today. Pt straining to void. Voided 100ml in urinal. Post void bladder scan 10 ml. Will continue to monitor.

## 2024-02-15 NOTE — PROGRESS NOTES
[facility]     Progress Note    Patient Name: Isaias Molina  : 1955  MRN: [id number]  Primary Care Physician:  Marizol Puentes MD  Date of admission: 2024  Service date and time: 02/15/24 14:35 EST  Subjective   Subjective   fall   Chief Complaint:     HPI:  Patient feels better today       Objective   Objective     Vitals:   Temp:  [97.7 °F (36.5 °C)-99.2 °F (37.3 °C)] 99.2 °F (37.3 °C)  Heart Rate:  [66-76] 76  Resp:  [16-22] 17  BP: (125-151)/(69-81) 150/72  Flow (L/min):  [2] 2  Physical Exam    Constitutional: Awake, alert in no distress    Eyes: PERRLA, sclerae anicteric, no conjunctival injection   HENT: NCAT, mucous membranes moist   Neck: Supple, no thyromegaly, no lymphadenopathy, trachea midline   Respiratory: Clear to auscultation bilaterally, nonlabored respirations    Cardiovascular: RRR, no murmurs, rubs, or gallops, palpable pedal pulses bilaterally   Gastrointestinal: Positive bowel sounds, soft, nontender, nondistended   Musculoskeletal: No bilateral ankle edema, no clubbing or cyanosis to extremities   Psychiatric: Appropriate affect, cooperative   Neurologic: Oriented x 3, strength symmetric in all extremities, Cranial Nerves grossly intact to confrontation, speech clear   Skin: No rashes     Result Review    Result Review:  I have personally reviewed the results from the time of this admission to 2/15/2024 14:35 EST and agree with these findings:  [x]  Laboratory list / accordion  []  Microbiology  []  Radiology  []  EKG/Telemetry   []  Cardiology/Vascular   []  Pathology  []  Old records  []  Other:  Most notable findings include: BUN 43, creatinine 2.36, glucose 155, sodium 136, potassium 4.9, chloride 106, WBC count 12.8, hemoglobin 10.1, hematocrit 30.4      Assessment & Plan   Assessment / Plan       Active Hospital Problems:  Active Hospital Problems    Diagnosis     **Left humeral fracture     Leukocytosis     DYLON (acute kidney injury)     Unsteady gait       Plan:    S/P fall with mildly displaced left humeral neck fracture  X ray left shoulder on admission showing comminuted displaced left humeral neck fracture.   Sling placed in ED  Tylenol 650 mg q 6 for mild pain  Morphine 4 mg q 4 as needed for severe pain  PT/OT evaluation  Fall precautions  Frequent neurovascular checks  Orthopedic evaluation: No surgical intervention at this time     DYLON, likely prerenal superimposed on CKD stage 3  Hyperkalemia  Nephrology is on board creatinine is at 2.36   Continue IV fluids      History of type 2 DM  Start lantus 25 units HS plus correction scale     Hypertension  Norvasc 10 mg once daily  Holding losartan given DYLON  Metoprolol 50 mg once daily     History of CVA  Continue antiplatelets and statin     History of Hypothyroidism  Continue Synthroid 25 mcg once daily     History of seizure  Continue Keppra        Acute cystitis: Patient was started on ceftriaxone    DVT prophylaxis:  Medical DVT prophylaxis orders are present.        CODE STATUS:   Level Of Support Discussed With: Patient  Code Status (Patient has no pulse and is not breathing): CPR (Attempt to Resuscitate)  Medical Interventions (Patient has pulse or is breathing): Full Support    Disposition:  I expect patient to be discharged in 2 days.    Jordan Cherry MD

## 2024-02-15 NOTE — PLAN OF CARE
"Assessment: Isaias Molina presents with functional mobility impairments which indicate the need for skilled intervention. Pt able to transfer to chair with mod Ax1 using hemiwalker. Unsteady while upright. Will continue to follow and progress as tolerated.     Plan/Recommendations:   If medically appropriate, Moderate Intensity Therapy recommended post-acute care. This is recommended as therapy feels the patient would require 3-4 days per week and wouldn't tolerate \"3 hour daily\" rehab intensity. SNF would be the preferred choice. If the patient does not agree to SNF, arrange HH or OP depending on home bound status. If patient is medically complex, consider LTACH. Pt requires no DME at discharge.     Pt desires Skilled Rehab placement at discharge. Pt cooperative; agreeable to therapeutic recommendations and plan of care.     "

## 2024-02-15 NOTE — THERAPY TREATMENT NOTE
"Subjective: Pt agreeable to therapeutic plan of care.    Objective:     Bed mobility - Supine to sitting Mod-A    Transfers - SPS transfer from EOB to recliner chair Mod-A utilizing hemiwalker. Pt unsteady while upright.    Ambulation - 0 feet N/A or Not attempted.    Therapeutic Exercise - 20 Reps B LE AROM lying supine and supported sitting / chair    Vitals: WNL    Pain: 9 VAS   Location: L UE  Intervention for pain: Repositioned, RN notified, Increased Activity, and Therapeutic Presence    Education: Provided education on the importance of mobility in the acute care setting, Verbal/Tactile Cues, Transfer Training, and WB'ing status    Assessment: Isaias Molina presents with functional mobility impairments which indicate the need for skilled intervention. Pt able to transfer to chair with mod Ax1 using hemiwalker. Unsteady while upright. Will continue to follow and progress as tolerated.     Plan/Recommendations:   If medically appropriate, Moderate Intensity Therapy recommended post-acute care. This is recommended as therapy feels the patient would require 3-4 days per week and wouldn't tolerate \"3 hour daily\" rehab intensity. SNF would be the preferred choice. If the patient does not agree to SNF, arrange HH or OP depending on home bound status. If patient is medically complex, consider LTACH. Pt requires no DME at discharge.     Pt desires Skilled Rehab placement at discharge. Pt cooperative; agreeable to therapeutic recommendations and plan of care.         Basic Mobility 6-click:  Rollin = Total, A lot = 2, A little = 3; 4 = None  Supine>Sit:   1 = Total, A lot = 2, A little = 3; 4 = None   Sit>Stand with arms:  1 = Total, A lot = 2, A little = 3; 4 = None  Bed>Chair:   1 = Total, A lot = 2, A little = 3; 4 = None  Ambulate in room:  1 = Total, A lot = 2, A little = 3; 4 = None  3-5 Steps with railin = Total, A lot = 2, A little = 3; 4 = None  Score: 10    Modified Harney: N/A = No " pre-op stroke/TIA    Post-Tx Position: Up in Chair, Alarms activated, and Call light and personal items within reach  PPE: gloves and gown

## 2024-02-16 LAB
ALBUMIN SERPL-MCNC: 2.8 G/DL (ref 3.5–5.2)
ANION GAP SERPL CALCULATED.3IONS-SCNC: 11 MMOL/L (ref 5–15)
BASOPHILS # BLD AUTO: 0.1 10*3/MM3 (ref 0–0.2)
BASOPHILS NFR BLD AUTO: 0.6 % (ref 0–1.5)
BUN SERPL-MCNC: 38 MG/DL (ref 8–23)
BUN/CREAT SERPL: 20 (ref 7–25)
CALCIUM SPEC-SCNC: 7.9 MG/DL (ref 8.6–10.5)
CHLORIDE SERPL-SCNC: 108 MMOL/L (ref 98–107)
CO2 SERPL-SCNC: 20 MMOL/L (ref 22–29)
CREAT SERPL-MCNC: 1.9 MG/DL (ref 0.76–1.27)
DEPRECATED RDW RBC AUTO: 44.6 FL (ref 37–54)
EGFRCR SERPLBLD CKD-EPI 2021: 37.9 ML/MIN/1.73
EOSINOPHIL # BLD AUTO: 0.5 10*3/MM3 (ref 0–0.4)
EOSINOPHIL NFR BLD AUTO: 4.6 % (ref 0.3–6.2)
ERYTHROCYTE [DISTWIDTH] IN BLOOD BY AUTOMATED COUNT: 14.6 % (ref 12.3–15.4)
GLUCOSE BLDC GLUCOMTR-MCNC: 124 MG/DL (ref 70–105)
GLUCOSE BLDC GLUCOMTR-MCNC: 128 MG/DL (ref 70–105)
GLUCOSE BLDC GLUCOMTR-MCNC: 187 MG/DL (ref 70–105)
GLUCOSE BLDC GLUCOMTR-MCNC: 203 MG/DL (ref 70–105)
GLUCOSE SERPL-MCNC: 123 MG/DL (ref 65–99)
HCT VFR BLD AUTO: 27.3 % (ref 37.5–51)
HGB BLD-MCNC: 9.1 G/DL (ref 13–17.7)
LYMPHOCYTES # BLD AUTO: 2.6 10*3/MM3 (ref 0.7–3.1)
LYMPHOCYTES NFR BLD AUTO: 24.3 % (ref 19.6–45.3)
MCH RBC QN AUTO: 29 PG (ref 26.6–33)
MCHC RBC AUTO-ENTMCNC: 33.4 G/DL (ref 31.5–35.7)
MCV RBC AUTO: 86.8 FL (ref 79–97)
MONOCYTES # BLD AUTO: 0.8 10*3/MM3 (ref 0.1–0.9)
MONOCYTES NFR BLD AUTO: 7.9 % (ref 5–12)
NEUTROPHILS NFR BLD AUTO: 6.6 10*3/MM3 (ref 1.7–7)
NEUTROPHILS NFR BLD AUTO: 62.6 % (ref 42.7–76)
NRBC BLD AUTO-RTO: 0 /100 WBC (ref 0–0.2)
PHOSPHATE SERPL-MCNC: 3.1 MG/DL (ref 2.5–4.5)
PLATELET # BLD AUTO: 166 10*3/MM3 (ref 140–450)
PMV BLD AUTO: 9.8 FL (ref 6–12)
POTASSIUM SERPL-SCNC: 4.3 MMOL/L (ref 3.5–5.2)
RBC # BLD AUTO: 3.14 10*6/MM3 (ref 4.14–5.8)
SODIUM SERPL-SCNC: 139 MMOL/L (ref 136–145)
WBC NRBC COR # BLD AUTO: 10.6 10*3/MM3 (ref 3.4–10.8)

## 2024-02-16 PROCEDURE — 96376 TX/PRO/DX INJ SAME DRUG ADON: CPT

## 2024-02-16 PROCEDURE — 63710000001 INSULIN LISPRO (HUMAN) PER 5 UNITS: Performed by: STUDENT IN AN ORGANIZED HEALTH CARE EDUCATION/TRAINING PROGRAM

## 2024-02-16 PROCEDURE — 82948 REAGENT STRIP/BLOOD GLUCOSE: CPT

## 2024-02-16 PROCEDURE — 25010000002 CEFTRIAXONE PER 250 MG: Performed by: INTERNAL MEDICINE

## 2024-02-16 PROCEDURE — 25010000002 MORPHINE PER 10 MG: Performed by: NURSE PRACTITIONER

## 2024-02-16 PROCEDURE — 97110 THERAPEUTIC EXERCISES: CPT

## 2024-02-16 PROCEDURE — 85025 COMPLETE CBC W/AUTO DIFF WBC: CPT | Performed by: INTERNAL MEDICINE

## 2024-02-16 PROCEDURE — 80069 RENAL FUNCTION PANEL: CPT | Performed by: INTERNAL MEDICINE

## 2024-02-16 RX ORDER — LOSARTAN POTASSIUM 50 MG/1
50 TABLET ORAL
Status: DISCONTINUED | OUTPATIENT
Start: 2024-02-17 | End: 2024-02-17 | Stop reason: HOSPADM

## 2024-02-16 RX ADMIN — TAMSULOSIN HYDROCHLORIDE 0.4 MG: 0.4 CAPSULE ORAL at 21:48

## 2024-02-16 RX ADMIN — ATORVASTATIN CALCIUM 80 MG: 40 TABLET, FILM COATED ORAL at 08:18

## 2024-02-16 RX ADMIN — CLONIDINE HYDROCHLORIDE 0.2 MG: 0.1 TABLET ORAL at 08:19

## 2024-02-16 RX ADMIN — METOPROLOL SUCCINATE 50 MG: 50 TABLET, EXTENDED RELEASE ORAL at 08:17

## 2024-02-16 RX ADMIN — LEVETIRACETAM 500 MG: 500 TABLET, FILM COATED ORAL at 08:18

## 2024-02-16 RX ADMIN — LEVETIRACETAM 500 MG: 500 TABLET, FILM COATED ORAL at 21:48

## 2024-02-16 RX ADMIN — ASPIRIN 81 MG: 81 TABLET, COATED ORAL at 08:18

## 2024-02-16 RX ADMIN — AMLODIPINE BESYLATE 10 MG: 5 TABLET ORAL at 08:19

## 2024-02-16 RX ADMIN — MORPHINE SULFATE 4 MG: 4 INJECTION, SOLUTION INTRAMUSCULAR; INTRAVENOUS at 02:45

## 2024-02-16 RX ADMIN — INSULIN LISPRO 2 UNITS: 100 INJECTION, SOLUTION INTRAVENOUS; SUBCUTANEOUS at 11:59

## 2024-02-16 RX ADMIN — INSULIN LISPRO 3 UNITS: 100 INJECTION, SOLUTION INTRAVENOUS; SUBCUTANEOUS at 21:54

## 2024-02-16 RX ADMIN — MORPHINE SULFATE 4 MG: 4 INJECTION, SOLUTION INTRAMUSCULAR; INTRAVENOUS at 21:58

## 2024-02-16 RX ADMIN — CEFTRIAXONE 2000 MG: 2 INJECTION, POWDER, FOR SOLUTION INTRAMUSCULAR; INTRAVENOUS at 08:24

## 2024-02-16 RX ADMIN — Medication 10 ML: at 08:24

## 2024-02-16 RX ADMIN — TAMSULOSIN HYDROCHLORIDE 0.4 MG: 0.4 CAPSULE ORAL at 08:18

## 2024-02-16 RX ADMIN — CLONIDINE HYDROCHLORIDE 0.2 MG: 0.1 TABLET ORAL at 21:48

## 2024-02-16 RX ADMIN — LEVOTHYROXINE SODIUM 25 MCG: 0.03 TABLET ORAL at 06:27

## 2024-02-16 RX ADMIN — Medication 10 ML: at 22:18

## 2024-02-16 RX ADMIN — CLOPIDOGREL BISULFATE 75 MG: 75 TABLET ORAL at 08:18

## 2024-02-16 RX ADMIN — ESCITALOPRAM OXALATE 10 MG: 10 TABLET ORAL at 08:18

## 2024-02-16 NOTE — PLAN OF CARE
"Assessment: Isaias Molina presents with ADL impairments affecting function including endurance / activity tolerance, pain, and strength. Demonstrated functioning below baseline abilities indicate the need for continued skilled intervention while inpatient. Pt not agreeable to mobilizing this date, however was amenable to performing bed exercises. Tolerating session today without incident. Will continue to follow and progress as tolerated.      Plan/Recommendations:   Moderate Intensity Therapy recommended post-acute care. This is recommended as therapy feels the patient would require 3-4 days per week and wouldn't tolerate \"3 hour daily\" rehab intensity. SNF would be the preferred choice. If the patient does not agree to SNF, arrange HH or OP depending on home bound status. If patient is medically complex, consider LTACH.. Pt requires no DME at discharge.      Pt desires Skilled Rehab placement at discharge. Pt cooperative; agreeable to therapeutic recommendations and plan of care.        "

## 2024-02-16 NOTE — DISCHARGE SUMMARY
[facility]         DISCHARGE SUMMARY    Patient Name: Isaias Molina  : 1955  MRN: [id number]    Date of Admission: 2024  Date of Discharge:  2024  Primary Care Physician: Marizol Puentes MD    Consults       Date and Time Order Name Status Description    2024  6:15 AM Inpatient Nephrology Consult Completed     2024  7:50 PM Inpatient Orthopedic Surgery Consult Completed     2024  2:57 PM Hospitalist (on-call MD unless specified)              Presenting Problem:   Left humeral fracture [S42.302A]  Fall, initial encounter [W19.XXXA]  Other closed nondisplaced fracture of proximal end of left humerus, initial encounter [S42.295A]  Leukocytosis, unspecified type [D72.829]    Active and Resolved Hospital Problems:  Active Hospital Problems    Diagnosis POA    **Left humeral fracture [S42.302A] Yes    Leukocytosis [D72.829] Yes    DYLON (acute kidney injury) [N17.9] Yes    Unsteady gait [R26.81] Yes      Resolved Hospital Problems   No resolved problems to display.         Hospital Course     Hospital Course:     Isaias Molina is a 68 y.o. male with a PMH of  a CVA, coronary artery disease, CKD stage III, diabetic, seizure disorder, hypertension, major depressive disorder, BPH presented to [facility] on 2024 S/P fall from assisted living while attempting to get up off his bed.  Patient reports he has been feeling weak for the past 2 days and fell to his left side.  He denies hitting his head, headache, focal weakness, tingling or numbness, chest pain, shortness of breath.  He does report having flulike symptoms in the past week that have since resolved. He does report constant severe left shoulder pain ever since he fell today, which aggravated with any type of movement.  Evaluation in ED x-ray showed left shoulder showed comminuted mildly displaced left humeral neck fracture with impaction and varus circulation of the distal fracture  fragment as well as extension to the tuberosities.  CT head showed old insult to the right subdural temporal area, there is a new finding of old insult in the right cerebellar hemisphere, chronic ischemic changes, global atrophy.  This is notable for elevated white blood cell of 22.2, hemoglobin is 12.7, chemistry notable for mild hyperkalemia potassium 5.5, elevated BUN 38, creatinine 2.6 which is elevated from his baseline 1.7 on June 6, 2013 2021.     Patient was evaluated by orthopedic surgeon, decision was to follow-up as outpatient, patient was on IV fluids, kidney function improved significantly, his urine culture showed no growth, patient will be discharged back to nursing facility where he resides.        Day of Discharge     Vital Signs:  Temp:  [98.4 °F (36.9 °C)-99.3 °F (37.4 °C)] 98.6 °F (37 °C)  Heart Rate:  [70-93] 93  Resp:  [13-19] 16  BP: (129-162)/(58-86) 150/86  Physical Exam:  Constitutional: Awake, alert   Eyes: PERRLA, sclerae anicteric, no conjunctival injection   HENT: NCAT, mucous membranes moist   Neck: Supple, no thyromegaly, no lymphadenopathy, trachea midline   Respiratory: Clear to auscultation bilaterally, nonlabored respirations    Cardiovascular: RRR, no murmurs, rubs, or gallops, palpable pedal pulses bilaterally   Gastrointestinal: Positive bowel sounds, soft, nontender, nondistended   Musculoskeletal: No bilateral ankle edema, no clubbing or cyanosis to extremities   Psychiatric: Appropriate affect, cooperative   Neurologic: Oriented x 3, strength symmetric in all extremities, Cranial Nerves grossly intact to confrontation, speech clear   Skin: No rashes     Pertinent  and/or Most Recent Results     LAB RESULTS:      Lab 02/16/24  0001 02/15/24  1119 02/14/24  1013 02/14/24  1001 02/14/24  0312 02/13/24  1331   WBC 10.60 12.80* 13.00*  --  12.20* 22.20*   HEMOGLOBIN 9.1* 10.1* 10.2*  --  9.6* 12.7*   HEMATOCRIT 27.3* 30.4* 29.9*  --  28.5* 38.4   PLATELETS 166 186 173  --  167  253   NEUTROS ABS 6.60 9.40* 8.00*  --  8.17* 16.87*   LYMPHS ABS 2.60 2.10 3.20*  --   --   --    MONOS ABS 0.80 0.90 1.20*  --   --   --    EOS ABS 0.50* 0.50* 0.50*  --  0.24  --    MCV 86.8 85.4 89.0  --  88.9 87.2   LACTATE  --   --   --  1.1  --   --          Lab 02/16/24  0001 02/15/24  1119 02/14/24  1013 02/14/24  0312 02/13/24  1331   SODIUM 139 136 139 138 143   POTASSIUM 4.3 4.9 4.8 4.9 5.5*   CHLORIDE 108* 106 106 108* 109*   CO2 20.0* 20.0* 22.0 17.0* 22.0   ANION GAP 11.0 10.0 11.0 13.0 12.0   BUN 38* 43* 44* 35* 38*   CREATININE 1.90* 2.36* 2.75* 2.30* 2.65*   EGFR 37.9* 29.3* 24.4* 30.2* 25.5*   GLUCOSE 123* 155* 123* 139* 156*   CALCIUM 7.9* 8.2* 8.1* 7.5* 9.0   MAGNESIUM  --  1.7 1.7 1.6  --    PHOSPHORUS 3.1  --   --   --   --          Lab 02/16/24  0001   ALBUMIN 2.8*                     Brief Urine Lab Results  (Last result in the past 365 days)        Color   Clarity   Blood   Leuk Est   Nitrite   Protein   CREAT   Urine HCG        02/14/24 0636 Red  Comment: Any Substance that causes an abnormal urine color can alter the accuracy of the chemical reactions.   Cloudy   Large (3+)   --  Comment: The Leukoesterase test cannot be performed due to the centrifugation of the specimen.     Negative   Negative           02/14/24 0636 Red  Comment: Any Substance that causes an abnormal urine color can alter the accuracy of the chemical reactions.   Cloudy   Large (3+)   --  Comment: The Leukoesterase test cannot be performed due to the centrifugation of the specimen.     Negative   Negative                 Microbiology Results (last 10 days)       Procedure Component Value - Date/Time    CANDIDA AURIS SCREEN - Swab, Axilla Right, Axilla Left and Groin [[id number]]  (Normal) Collected: 02/14/24 1307    Lab Status: Preliminary result Specimen: Swab from Axilla Right, Axilla Left and Groin Updated: 02/15/24 1315     Candida Auris Screen Culture No Candida auris isolated at 24 hours    Urine Culture - Urine,  Urine, Clean Catch [[id number]]  (Normal) Collected: 02/14/24 0636    Lab Status: Final result Specimen: Urine, Clean Catch Updated: 02/15/24 1023     Urine Culture No growth    Blood Culture - Blood, Arm, Left [[id number]]  (Normal) Collected: 02/13/24 1505    Lab Status: Preliminary result Specimen: Blood from Arm, Left Updated: 02/15/24 1515     Blood Culture No growth at 2 days    Narrative:      Less than seven (7) mL's of blood was collected.  Insufficient quantity may yield false negative results.    Blood Culture - Blood, Hand, Right [[id number]]  (Normal) Collected: 02/13/24 1505    Lab Status: Preliminary result Specimen: Blood from Hand, Right Updated: 02/15/24 1515     Blood Culture No growth at 2 days    Narrative:      Less than seven (7) mL's of blood was collected.  Insufficient quantity may yield false negative results.            CT Abdomen Pelvis Without Contrast    Result Date: 2/13/2024  Impression: Impression: Cholelithiasis. Mild gallbladder distention. This may be secondary to prolonged fasting state. If there is concern for cholecystitis, correlate with ultrasound and/or HIDA. Marked prostatomegaly. Electronically Signed: Jordan Ortiz MD  2/13/2024 4:12 PM EST  Workstation ID: [id number]    CT Head Without Contrast    Result Date: 2/13/2024  Impression: Impression: 1.Old insult to the right of subdural/temporal area 2.Interval insult right cerebellum that appears more chronic 3.Diffuse white matter changes involving the cerebral hemispheres which could reflect more chronic small vessel ischemic change. 4.Global atrophy 5.Minimal right ethmoid mucosal thickening. Electronically Signed: Dinh Freire MD  2/13/2024 4:08 PM EST  Workstation ID: [id number]    XR Chest 1 View    Result Date: 2/13/2024  Impression: Impression: Somewhat limited study. No acute process identified. Electronically Signed: Amanda Mccarthy MD  2/13/2024 2:08 PM EST  Workstation ID: [id number]    XR Shoulder 2+ View  Left    Result Date: 2/13/2024  Impression: Impression: Comminuted mildly displaced left humeral neck fracture with impaction and varus angulation of the distal fracture fragment, as well as extension to the tuberosities. Electronically Signed: Garrett Jarvis MD  2/13/2024 2:07 PM EST  Workstation ID: [id number]     Results for orders placed during the hospital encounter of 06/04/21    Duplex Vein Mapping Lower Extremity - Bilateral CAR    Interpretation Summary  · Right greater saphenous vein above knee: with adequate size.  Right greater saphenous vein below knee: with adequate size.  Left greater saphenous vein above knee: with inadequate size.  Left greater saphenous vein below knee: with inadequate size.      Results for orders placed during the hospital encounter of 06/04/21    Duplex Vein Mapping Lower Extremity - Bilateral CAR    Interpretation Summary  · Right greater saphenous vein above knee: with adequate size.  Right greater saphenous vein below knee: with adequate size.  Left greater saphenous vein above knee: with inadequate size.  Left greater saphenous vein below knee: with inadequate size.      Results for orders placed during the hospital encounter of 06/04/21    Adult Transthoracic Echo Complete W/ Cont if Necessary Per Protocol    Interpretation Summary  · Left ventricular ejection fraction appears to be 61 - 65%.  · No pericardial effusion noted      Labs Pending at Discharge:  Pending Labs       Order Current Status    Blood Culture - Blood, Arm, Left Preliminary result    Blood Culture - Blood, Hand, Right Preliminary result    CANDIDA AURIS SCREEN - Swab, Axilla Right, Axilla Left and Groin Preliminary result              Discharge Details        Discharge Medications        New Medications        Instructions Start Date   cephalexin 250 MG capsule  Commonly known as: Keflex   250 mg, Oral, 3 Times Daily      HYDROcodone-acetaminophen 5-325 MG per tablet  Commonly known as: Norco   1 tablet,  Oral, Every 6 Hours PRN             Continue These Medications        Instructions Start Date   acetaminophen 325 MG tablet  Commonly known as: TYLENOL   650 mg, Oral, Every 4 Hours PRN      amLODIPine 10 MG tablet  Commonly known as: NORVASC   10 mg, Oral, Daily      ammonium lactate 12 % lotion  Commonly known as: LAC-HYDRIN   Topical, 2 Times Daily PRN      aspirin 81 MG EC tablet   81 mg, Oral, Daily      atorvastatin 80 MG tablet  Commonly known as: LIPITOR   80 mg, Oral, Daily      B-12 Compliance Injection 1000 MCG/ML kit  Generic drug: Cyanocobalamin   1,000 mcg, Subcutaneous, Every 30 Days      cloNIDine 0.2 MG tablet  Commonly known as: CATAPRES   0.2 mg, Oral, 2 Times Daily      clopidogrel 75 MG tablet  Commonly known as: PLAVIX   75 mg, Oral, Daily      docusate sodium 100 MG capsule  Commonly known as: Colace   100 mg, Oral, Daily      escitalopram 10 MG tablet  Commonly known as: LEXAPRO   TAKE ONE TABLET BY MOUTH EVERY DAY      insulin aspart prot-insulin aspart (70-30) 100 UNIT/ML injection  Commonly known as: novoLOG 70/30   30 Units, Subcutaneous, 2 Times Daily      levETIRAcetam 500 MG tablet  Commonly known as: Keppra   500 mg, Oral, 2 Times Daily      levothyroxine 25 MCG tablet  Commonly known as: Synthroid   25 mcg, Oral, Daily      losartan 100 MG tablet  Commonly known as: COZAAR   100 mg, Oral, Every 24 Hours Scheduled      metoprolol succinate XL 50 MG 24 hr tablet  Commonly known as: TOPROL-XL   50 mg, Oral, Every 24 Hours Scheduled      MiraLax 17 g packet  Generic drug: polyethylene glycol   17 g, Oral, Daily PRN      multivitamin with minerals tablet tablet   1 tablet, Oral, Daily      nitroglycerin 0.4 MG SL tablet  Commonly known as: NITROSTAT   0.4 mg, Sublingual, Every 5 Minutes PRN, Take no more than 3 doses in 15 minutes.      tamsulosin 0.4 MG capsule 24 hr capsule  Commonly known as: FLOMAX   TAKE ONE CAPSULE BY MOUTH TWICE DAILY      traMADol 50 MG tablet  Commonly known as:  ULTRAM   25 mg, Oral, Every 6 Hours PRN      vitamin D 1.25 MG (26411 UT) capsule capsule  Commonly known as: ERGOCALCIFEROL   50,000 Units, Oral, Weekly, On Saturday               Allergies   Allergen Reactions    Shellfish Allergy Nausea And Vomiting         Discharge Disposition:   Long Term Care (DC - External)    Discharge Condition: .cond    Diet:  Hospital:  Diet Order   Procedures    Diet: Cardiac Diets, Diabetic Diets, Renal Diets; Healthy Heart (2-3 Na+); Consistent Carbohydrate; Low Sodium (2-3g), Low Potassium, Low Phosphorus; Texture: Regular Texture (IDDSI 7); Fluid Consistency: Thin (IDDSI 0)         Discharge Activity:   Activity Instructions       Activity as Tolerated      Activity as Tolerated                CODE STATUS:  Code Status and Medical Interventions:   Ordered at: 02/14/24 2463     Level Of Support Discussed With:    Patient     Code Status (Patient has no pulse and is not breathing):    CPR (Attempt to Resuscitate)     Medical Interventions (Patient has pulse or is breathing):    Full Support         No future appointments.    Additional Instructions for the Follow-ups that You Need to Schedule       Discharge Follow-up with PCP   As directed       Currently Documented PCP:    Marizol Puentes MD    PCP Phone Number:    [phone]     Follow Up Details: in 3 to 5 days        Discharge Follow-up with PCP   As directed       Currently Documented PCP:    Marizol Puentes MD    PCP Phone Number:    [phone]     Follow Up Details: in 3 to 5 days                Time spent on Discharge including face to face service:  30 minutes    Jordan Cherry MD

## 2024-02-16 NOTE — PLAN OF CARE
Problem: Adult Inpatient Plan of Care  Goal: Absence of Hospital-Acquired Illness or Injury  Intervention: Identify and Manage Fall Risk  Recent Flowsheet Documentation  Taken 2/16/2024 1408 by Elizabeth Moseley RN  Safety Promotion/Fall Prevention:   assistive device/personal items within reach   clutter free environment maintained   fall prevention program maintained   lighting adjusted   nonskid shoes/slippers when out of bed   safety round/check completed   room organization consistent  Taken 2/16/2024 1209 by Elizabeth Moseley RN  Safety Promotion/Fall Prevention:   assistive device/personal items within reach   clutter free environment maintained   fall prevention program maintained   lighting adjusted   nonskid shoes/slippers when out of bed   room organization consistent   safety round/check completed  Taken 2/16/2024 1021 by Elizabeth Moseley RN  Safety Promotion/Fall Prevention:   assistive device/personal items within reach   clutter free environment maintained   fall prevention program maintained   lighting adjusted   nonskid shoes/slippers when out of bed   room organization consistent   safety round/check completed  Intervention: Prevent Skin Injury  Recent Flowsheet Documentation  Taken 2/16/2024 1408 by Elizabeth Moseley RN  Body Position: turned  Taken 2/16/2024 1209 by Elizabeth Moseley RN  Body Position: turned  Taken 2/16/2024 1021 by Elizabeth Moseley RN  Body Position: turned  Intervention: Prevent and Manage VTE (Venous Thromboembolism) Risk  Recent Flowsheet Documentation  Taken 2/16/2024 1408 by Elizabeth Moseley RN  Activity Management: activity encouraged  Taken 2/16/2024 1209 by Elizabeth Moseley RN  Activity Management: activity encouraged  Range of Motion: active ROM (range of motion) encouraged  Taken 2/16/2024 1021 by Elizabeth Moseley RN  Activity Management: activity encouraged  Intervention: Prevent Infection  Recent Flowsheet Documentation  Taken 2/16/2024 1408 by Elizabeth Moseley RN  Infection Prevention:   hand  hygiene promoted   personal protective equipment utilized  Taken 2/16/2024 1209 by Elizabeth Moseley, BLAS  Infection Prevention:   personal protective equipment utilized   hand hygiene promoted  Taken 2/16/2024 1021 by Elizabeth Moseley RN  Infection Prevention:   hand hygiene promoted   personal protective equipment utilized  Goal: Optimal Comfort and Wellbeing  Intervention: Provide Person-Centered Care  Recent Flowsheet Documentation  Taken 2/16/2024 1209 by Elizabeth Moseley, RN  Trust Relationship/Rapport: care explained  Taken 2/16/2024 0845 by Elizabeth Moseley, BLAS  Trust Relationship/Rapport: care explained   Goal Outcome Evaluation:         Pt to DC to [facility]. Awaiting  transport. Attempted to call report X5. Unsuccessful.

## 2024-02-16 NOTE — PLAN OF CARE
Problem: Adult Inpatient Plan of Care  Goal: Absence of Hospital-Acquired Illness or Injury  Intervention: Identify and Manage Fall Risk  Description: Perform standard risk assessment on admission using a validated tool or comprehensive approach appropriate to the patient; reassess fall risk frequently, with change in status or transfer to another level of care.  Communicate fall injury risk to interprofessional healthcare team.  Determine need for increased observation, equipment and environmental modification, such as low bed, signage and supportive, nonskid footwear.  Adjust safety measures to individual developmental age, stage and identified risk factors.  Reinforce the importance of safety and physical activity with patient and family.  Perform regular intentional rounding to assess need for position change, pain assessment and personal needs, including assistance with toileting.  Recent Flowsheet Documentation  Taken 2/16/2024 0400 by Michael Thornton, RN  Safety Promotion/Fall Prevention:   activity supervised   assistive device/personal items within reach   clutter free environment maintained   fall prevention program maintained   gait belt   safety round/check completed   room organization consistent   nonskid shoes/slippers when out of bed  Taken 2/16/2024 0000 by Michael Thornton, RN  Safety Promotion/Fall Prevention:   activity supervised   assistive device/personal items within reach   clutter free environment maintained   fall prevention program maintained   gait belt   safety round/check completed   room organization consistent   nonskid shoes/slippers when out of bed  Taken 2/15/2024 2200 by Michael Thornton, RN  Safety Promotion/Fall Prevention:   activity supervised   assistive device/personal items within reach   clutter free environment maintained   fall prevention program maintained   gait belt   safety round/check completed   room organization consistent   nonskid shoes/slippers when out of  bed  Taken 2/15/2024 2000 by Michael Thornton RN  Safety Promotion/Fall Prevention:   activity supervised   assistive device/personal items within reach   clutter free environment maintained   fall prevention program maintained   gait belt   safety round/check completed   room organization consistent   nonskid shoes/slippers when out of bed  Intervention: Prevent Skin Injury  Description: Perform a screening for skin injury risk, such as pressure or moisture associated skin damage on admission and at regular intervals throughout hospital stay.  Keep all areas of skin (especially folds) clean and dry.  Maintain adequate skin hydration.  Relieve and redistribute pressure and protect bony prominences; implement measures based on patient-specific risk factors.  Match turning and repositioning schedule to clinical condition.  Encourage weight shift frequently; assist with reposition if unable to complete independently.  Float heels off bed; avoid pressure on the Achilles tendon.  Keep skin free from extended contact with medical devices.  Encourage functional activity and mobility, as early as tolerated.  Use aids (e.g., slide boards, mechanical lift) during transfer.  Recent Flowsheet Documentation  Taken 2/15/2024 2000 by Michael Thornton, RN  Skin Protection:   adhesive use limited   incontinence pads utilized  Intervention: Prevent Infection  Description: Maintain skin and mucous membrane integrity; promote hand, oral and pulmonary hygiene.  Optimize fluid balance, nutrition, sleep and glycemic control to maximize infection resistance.  Identify potential sources of infection early to prevent or mitigate progression of infection (e.g., wound, lines, devices).  Evaluate ongoing need for invasive devices; remove promptly when no longer indicated.  Recent Flowsheet Documentation  Taken 2/15/2024 2000 by Michael Thornton, RN  Infection Prevention:   single patient room provided   rest/sleep promoted   personal protective  equipment utilized   hand hygiene promoted   environmental surveillance performed  Goal: Optimal Comfort and Wellbeing  Intervention: Provide Person-Centered Care  Description: Use a family-focused approach to care.  Develop trust and rapport by proactively providing information, encouraging questions, addressing concerns and offering reassurance.  Acknowledge emotional response to hospitalization.  Recognize and utilize personal coping strategies.  Honor spiritual and cultural preferences.  Recent Flowsheet Documentation  Taken 2/15/2024 2000 by Michael Thornton RN  Trust Relationship/Rapport:   care explained   choices provided   emotional support provided   empathic listening provided   questions answered   questions encouraged   reassurance provided   thoughts/feelings acknowledged     Problem: Fall Injury Risk  Goal: Absence of Fall and Fall-Related Injury  Intervention: Identify and Manage Contributors  Description: Develop a fall prevention plan with the patient and caregiver/family.  Provide reorientation, appropriate sensory stimulation and routines with changes in mental status to decrease risk of fall.  Promote use of personal vision and auditory aids.  Assess assistance level required for safe and effective self-care; provide support as needed, such as toileting, mobilization. For age 65 and older, implement timed toileting with assistance.  Encourage physical activity, such as performance of mobility and self-care at highest level of patient ability, multicomponent exercise program and provision of appropriate assistive devices.  If fall occurs, assess the severity of injury; implement fall injury protocol. Determine the cause and revise fall injury prevention plan.  Regularly review medication contribution to fall risk; adjust medication administration times to minimize risk of falling.  Consider risk related to polypharmacy and age.  Balance adequate pain management with potential for  oversedation.  Recent Flowsheet Documentation  Taken 2/15/2024 2000 by Michael Thornton RN  Medication Review/Management:   medications reviewed   high-risk medications identified  Intervention: Promote Injury-Free Environment  Description: Provide a safe, barrier-free environment that encourages independent activity.  Keep care area uncluttered and well-lighted.  Determine need for increased observation or monitoring.  Avoid use of devices that minimize mobility, such as restraints or indwelling urinary catheter.  Recent Flowsheet Documentation  Taken 2/16/2024 0400 by Michael Thornton RN  Safety Promotion/Fall Prevention:   activity supervised   assistive device/personal items within reach   clutter free environment maintained   fall prevention program maintained   gait belt   safety round/check completed   room organization consistent   nonskid shoes/slippers when out of bed  Taken 2/16/2024 0000 by Michael Thornton RN  Safety Promotion/Fall Prevention:   activity supervised   assistive device/personal items within reach   clutter free environment maintained   fall prevention program maintained   gait belt   safety round/check completed   room organization consistent   nonskid shoes/slippers when out of bed  Taken 2/15/2024 2200 by Michael Thornton RN  Safety Promotion/Fall Prevention:   activity supervised   assistive device/personal items within reach   clutter free environment maintained   fall prevention program maintained   gait belt   safety round/check completed   room organization consistent   nonskid shoes/slippers when out of bed  Taken 2/15/2024 2000 by Michael Thornton RN  Safety Promotion/Fall Prevention:   activity supervised   assistive device/personal items within reach   clutter free environment maintained   fall prevention program maintained   gait belt   safety round/check completed   room organization consistent   nonskid shoes/slippers when out of bed     Problem: Skin Injury Risk Increased  Goal:  Skin Health and Integrity  Intervention: Optimize Skin Protection  Description: Perform a full pressure injury risk assessment, as indicated by screening, upon admission to care unit.  Reassess skin (injury risk, full inspection) frequently (e.g., scheduled interval, with change in condition) to provide optimal early detection and prevention.  Maintain adequate tissue perfusion (e.g., encourage fluid balance; avoid crossing legs, constrictive clothing or devices) to promote tissue oxygenation.  Maintain head of bed at lowest degree of elevation tolerated, considering medical condition and other restrictions.  Avoid positioning onto an area that remains reddened.  Minimize incontinence and moisture (e.g., toileting schedule; moisture-wicking pad, diaper or incontinence collection device; skin moisture barrier).  Cleanse skin promptly and gently when soiled utilizing a pH-balanced cleanser.  Relieve and redistribute pressure (e.g., scheduled position changes, weight shifts, use of support surface, medical device repositioning, protective dressing application, use of positioning device, microclimate control, use of pressure-injury-monitor  Encourage increased activity, such as sitting in a chair at the bedside or early mobilization, when able to tolerate.  Recent Flowsheet Documentation  Taken 2/15/2024 2000 by Michael Thornton, RN  Pressure Reduction Techniques: weight shift assistance provided  Skin Protection:   adhesive use limited   incontinence pads utilized   Goal Outcome Evaluation:     Patient c/o left shoulder pain r/t fall and fracture.  PRN morphine given with relief.  Vitals WNL.  LR @ 100.  Good urine output.

## 2024-02-16 NOTE — NURSING NOTE
Attempted to call report to [facility] ([facility]) [phone]. X3. No answer and no voicemail. Will attempt again.

## 2024-02-16 NOTE — PROGRESS NOTES
[facility] Progress Note      Patient Name: Isaias Molina  : 1955  MRN: [id number]  Primary Care Physician:  Marizol Puentes MD  Date of admission: 2024    Subjective     Interval History:     Patient resting comfortably.  Denies any chest pain, shortness of breath, nausea or vomiting    Review of Systems:   As noted above    Objective     Vitals:   Temp:  [98.4 °F (36.9 °C)-99.3 °F (37.4 °C)] 98.6 °F (37 °C)  Heart Rate:  [70-93] 93  Resp:  [13-19] 16  BP: (129-162)/(58-86) 150/86    Intake/Output Summary (Last 24 hours) at 2024 0917  Last data filed at 2024 0800  Gross per 24 hour   Intake 2080 ml   Output 1050 ml   Net 1030 ml       Physical Exam:    General Appearance: NAD  HEENT: oral mucosa normal, nonicteric sclera  Neck: supple, no JVD  Lungs: CTA  Heart: RRR, normal S1 and S2  Abdomen: soft, nondistended  Extremities: no edema  Neuro: Awake alert and moving all extremities    Scheduled Meds:     amLODIPine, 10 mg, Oral, Daily  aspirin, 81 mg, Oral, Daily  atorvastatin, 80 mg, Oral, Daily  cloNIDine, 0.2 mg, Oral, BID  clopidogrel, 75 mg, Oral, Daily  enoxaparin, 40 mg, Subcutaneous, Daily  escitalopram, 10 mg, Oral, Daily  insulin glargine, 25 Units, Subcutaneous, Nightly  insulin lispro, 2-7 Units, Subcutaneous, 4x Daily AC & at Bedtime  levETIRAcetam, 500 mg, Oral, BID  levothyroxine, 25 mcg, Oral, Q AM  [Held by provider] losartan, 100 mg, Oral, Q24H  metoprolol succinate XL, 50 mg, Oral, Q24H  sodium chloride, 10 mL, Intravenous, Q12H  tamsulosin, 0.4 mg, Oral, BID      IV Meds:   lactated ringers, 100 mL/hr, Last Rate: 100 mL/hr (02/15/24 2058)        Results Reviewed:   I have personally reviewed the results from the time of this admission to 2024 09:17 EST     Results from last 7 days   Lab Units 24  0001 02/15/24  1119 24  1013   SODIUM mmol/L 139 136 139   POTASSIUM mmol/L 4.3 4.9 4.8   CHLORIDE mmol/L 108* 106 106    CO2 mmol/L 20.0* 20.0* 22.0   BUN mg/dL 38* 43* 44*   CREATININE mg/dL 1.90* 2.36* 2.75*   CALCIUM mg/dL 7.9* 8.2* 8.1*   GLUCOSE mg/dL 123* 155* 123*     Estimated Creatinine Clearance: 47.2 mL/min (A) (by C-G formula based on SCr of 1.9 mg/dL (H)).  Results from last 7 days   Lab Units 02/16/24  0001 02/15/24  1119 02/14/24  1013 02/14/24  0312   MAGNESIUM mg/dL  --  1.7 1.7 1.6   PHOSPHORUS mg/dL 3.1  --   --   --          Results from last 7 days   Lab Units 02/16/24  0001 02/15/24  1119 02/14/24  1013 02/14/24  0312 02/13/24  1331   WBC 10*3/mm3 10.60 12.80* 13.00* 12.20* 22.20*   HEMOGLOBIN g/dL 9.1* 10.1* 10.2* 9.6* 12.7*   PLATELETS 10*3/mm3 166 186 173 167 253           Assessment / Plan     ASSESSMENT:    Acute kidney injury.  Most likely prerenal in etiology.  renal function improving with creatinine 1.9 this morning from peak of 2.75 mg/DL.  electrolytes, volume status okay  Chronic kidney disease stage IIIb.  Patient has underlying chronic kidney disease due to hypertensive nephrosclerosis with baseline creatinine around 1.7-1.8 Mg/DL  Left humeral fracture.  Evaluated by orthopedic  Hypertension with chronic kidney disease.  blood pressure running high    PLAN:  Discontinue IV fluids  Okay to restart losartan  Follow-up as outpatient as scheduled    Sherman Will MD  02/16/24  09:17 [facility]    [facility]  [phone]

## 2024-02-16 NOTE — THERAPY TREATMENT NOTE
"Subjective: Pt agreeable to therapeutic plan of care.  Cognition: oriented to Person, Place, Time, and Situation    Objective:     Pt refused all mobility but was amenable to exercises in bed.    Therapeutic Exercise - 10 Reps R Upper Extremity, R Lower Extremity, and L Lower Extremity AROM lying supine with HOB elevated.    Vitals: WNL    Pain: 8 VAS  Location: Left UE, ribs, LE  Interventions for pain: Increased Activity and Therapeutic Presence  Education: Provided education on the importance of mobility in the acute care setting and Verbal/Tactile Cues      Assessment: Isaias Molina presents with ADL impairments affecting function including endurance / activity tolerance, pain, and strength. Demonstrated functioning below baseline abilities indicate the need for continued skilled intervention while inpatient. Pt not agreeable to mobilizing this date, however was amenable to performing bed exercises. Tolerating session today without incident. Will continue to follow and progress as tolerated.     Plan/Recommendations:   Moderate Intensity Therapy recommended post-acute care. This is recommended as therapy feels the patient would require 3-4 days per week and wouldn't tolerate \"3 hour daily\" rehab intensity. SNF would be the preferred choice. If the patient does not agree to SNF, arrange HH or OP depending on home bound status. If patient is medically complex, consider LTACH.. Pt requires no DME at discharge.     Pt desires Skilled Rehab placement at discharge. Pt cooperative; agreeable to therapeutic recommendations and plan of care.     Modified Jerad: N/A = No pre-op stroke/TIA    Post-Tx Position: Supine with HOB Elevated, Alarms activated, and Call light and personal items within reach  PPE: gloves and gown    "

## 2024-02-17 VITALS
HEIGHT: 69 IN | TEMPERATURE: 97.8 F | HEART RATE: 79 BPM | DIASTOLIC BLOOD PRESSURE: 84 MMHG | OXYGEN SATURATION: 92 % | RESPIRATION RATE: 18 BRPM | SYSTOLIC BLOOD PRESSURE: 131 MMHG | BODY MASS INDEX: 37.16 KG/M2 | WEIGHT: 250.88 LBS

## 2024-02-17 LAB
ALBUMIN SERPL-MCNC: 3 G/DL (ref 3.5–5.2)
ANION GAP SERPL CALCULATED.3IONS-SCNC: 8 MMOL/L (ref 5–15)
BUN SERPL-MCNC: 32 MG/DL (ref 8–23)
BUN/CREAT SERPL: 16.8 (ref 7–25)
CALCIUM SPEC-SCNC: 8.3 MG/DL (ref 8.6–10.5)
CHLORIDE SERPL-SCNC: 107 MMOL/L (ref 98–107)
CO2 SERPL-SCNC: 24 MMOL/L (ref 22–29)
CREAT SERPL-MCNC: 1.9 MG/DL (ref 0.76–1.27)
EGFRCR SERPLBLD CKD-EPI 2021: 37.9 ML/MIN/1.73
GLUCOSE BLDC GLUCOMTR-MCNC: 167 MG/DL (ref 70–105)
GLUCOSE SERPL-MCNC: 143 MG/DL (ref 65–99)
PHOSPHATE SERPL-MCNC: 3.5 MG/DL (ref 2.5–4.5)
POTASSIUM SERPL-SCNC: 4.3 MMOL/L (ref 3.5–5.2)
SODIUM SERPL-SCNC: 139 MMOL/L (ref 136–145)
WHOLE BLOOD HOLD SPECIMEN: NORMAL

## 2024-02-17 PROCEDURE — 82948 REAGENT STRIP/BLOOD GLUCOSE: CPT | Performed by: STUDENT IN AN ORGANIZED HEALTH CARE EDUCATION/TRAINING PROGRAM

## 2024-02-17 PROCEDURE — 63710000001 INSULIN LISPRO (HUMAN) PER 5 UNITS: Performed by: STUDENT IN AN ORGANIZED HEALTH CARE EDUCATION/TRAINING PROGRAM

## 2024-02-17 PROCEDURE — 80069 RENAL FUNCTION PANEL: CPT | Performed by: INTERNAL MEDICINE

## 2024-02-17 RX ORDER — CEPHALEXIN 250 MG/1
250 CAPSULE ORAL 3 TIMES DAILY
Qty: 28 CAPSULE | Refills: 0 | Status: SHIPPED | OUTPATIENT
Start: 2024-02-17

## 2024-02-17 RX ORDER — HYDROCODONE BITARTRATE AND ACETAMINOPHEN 5; 325 MG/1; MG/1
1 TABLET ORAL EVERY 6 HOURS PRN
Qty: 12 TABLET | Refills: 0 | Status: SHIPPED | OUTPATIENT
Start: 2024-02-17

## 2024-02-17 RX ADMIN — ATORVASTATIN CALCIUM 80 MG: 40 TABLET, FILM COATED ORAL at 07:14

## 2024-02-17 RX ADMIN — ESCITALOPRAM OXALATE 10 MG: 10 TABLET ORAL at 07:15

## 2024-02-17 RX ADMIN — METOPROLOL SUCCINATE 50 MG: 50 TABLET, EXTENDED RELEASE ORAL at 07:15

## 2024-02-17 RX ADMIN — LOSARTAN POTASSIUM 50 MG: 50 TABLET, FILM COATED ORAL at 07:22

## 2024-02-17 RX ADMIN — CLOPIDOGREL BISULFATE 75 MG: 75 TABLET ORAL at 07:15

## 2024-02-17 RX ADMIN — LEVETIRACETAM 500 MG: 500 TABLET, FILM COATED ORAL at 07:15

## 2024-02-17 RX ADMIN — AMLODIPINE BESYLATE 10 MG: 5 TABLET ORAL at 07:15

## 2024-02-17 RX ADMIN — TAMSULOSIN HYDROCHLORIDE 0.4 MG: 0.4 CAPSULE ORAL at 07:15

## 2024-02-17 RX ADMIN — INSULIN LISPRO 2 UNITS: 100 INJECTION, SOLUTION INTRAVENOUS; SUBCUTANEOUS at 07:14

## 2024-02-17 RX ADMIN — ASPIRIN 81 MG: 81 TABLET, COATED ORAL at 07:14

## 2024-02-17 RX ADMIN — CLONIDINE HYDROCHLORIDE 0.2 MG: 0.1 TABLET ORAL at 07:14

## 2024-02-17 RX ADMIN — LEVOTHYROXINE SODIUM 25 MCG: 0.03 TABLET ORAL at 07:09

## 2024-02-17 NOTE — PLAN OF CARE
Goal Outcome Evaluation:      Pt for DC. Transportation delay. Call light within reach

## 2024-02-17 NOTE — CASE MANAGEMENT/SOCIAL WORK
Case Management Discharge Note      Final Note: [facility]          Destination Coordination complete.      Service Provider Selected Services Address Phone Fax Patient Preferred    [facility][note 3] [address] [place] [address] [phone] --             Transportation Services  W/C Van: [facility]    Final Discharge Disposition Code: 03 - skilled nursing facility (SNF)
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
## 2024-02-17 NOTE — NURSING NOTE
Report called to [facility], spoke with Rocio, Pharmacy updated, MD send over script to correct pharmacy, awaiting  van pickup,  pending. Call placed to kevin, unable to leave a message, voicemail full.

## 2024-02-18 LAB
BACTERIA SPEC AEROBE CULT: NORMAL
BACTERIA SPEC AEROBE CULT: NORMAL

## 2024-02-19 LAB — BACTERIA ISLT: NORMAL

## 2024-05-22 ENCOUNTER — OFFICE VISIT (OUTPATIENT)
Dept: CARDIOLOGY | Facility: CLINIC | Age: 69
End: 2024-05-22
Payer: MEDICARE

## 2024-05-22 VITALS
WEIGHT: 243.2 LBS | HEART RATE: 75 BPM | HEIGHT: 69 IN | SYSTOLIC BLOOD PRESSURE: 130 MMHG | OXYGEN SATURATION: 98 % | DIASTOLIC BLOOD PRESSURE: 64 MMHG | BODY MASS INDEX: 36.02 KG/M2

## 2024-05-22 DIAGNOSIS — I21.4 NON-ST ELEVATION MI (NSTEMI): ICD-10-CM

## 2024-05-22 DIAGNOSIS — Z79.4 TYPE 2 DIABETES MELLITUS WITH HYPERGLYCEMIA, WITH LONG-TERM CURRENT USE OF INSULIN: ICD-10-CM

## 2024-05-22 DIAGNOSIS — E78.2 MIXED HYPERLIPIDEMIA: ICD-10-CM

## 2024-05-22 DIAGNOSIS — N18.32 CHRONIC KIDNEY DISEASE, STAGE 3B: ICD-10-CM

## 2024-05-22 DIAGNOSIS — I10 BENIGN ESSENTIAL HYPERTENSION: ICD-10-CM

## 2024-05-22 DIAGNOSIS — I63.439 CEREBROVASCULAR ACCIDENT (CVA) DUE TO EMBOLISM OF POSTERIOR CEREBRAL ARTERY, UNSPECIFIED BLOOD VESSEL LATERALITY: ICD-10-CM

## 2024-05-22 DIAGNOSIS — E66.01 CLASS 2 SEVERE OBESITY WITH SERIOUS COMORBIDITY AND BODY MASS INDEX (BMI) OF 35.0 TO 35.9 IN ADULT, UNSPECIFIED OBESITY TYPE: ICD-10-CM

## 2024-05-22 DIAGNOSIS — E11.65 TYPE 2 DIABETES MELLITUS WITH HYPERGLYCEMIA, WITH LONG-TERM CURRENT USE OF INSULIN: ICD-10-CM

## 2024-05-22 DIAGNOSIS — I25.10 CORONARY ARTERY DISEASE INVOLVING NATIVE CORONARY ARTERY OF NATIVE HEART WITHOUT ANGINA PECTORIS: Primary | ICD-10-CM

## 2024-05-22 RX ORDER — INSULIN ASPART 100 [IU]/ML
10 INJECTION, SOLUTION INTRAVENOUS; SUBCUTANEOUS
COMMUNITY
Start: 2024-05-14

## 2024-05-22 NOTE — PROGRESS NOTES
Subjective:     Encounter Date:05/22/2024      Patient ID: Isaias Molina is a 68 y.o. male.    Chief Complaint:  History of Present Illness    Isaias Molina is a 68 y.o. male with a history of CAD s/p PCI, CVA, hypertension, hyperlipidemia, type 2 diabetes, CKD stage IIIb who presents to the office today for evaluation of lightheadedness/dizziness.  Patient has not been seen by cardiology in the outpatient setting.  He was most recently evaluated by Dr. Robles in 2021 and underwent stent placement to LAD and diagonal branch at that time after being turned down for coronary artery bypass surgery.  Patient also notes that he had stents placed about 12 years ago, he is unsure where.   Patient seen and examined, labs and chart reviewed. Patient states he has been experiencing lightheadedness/dizziness when standing up too fast.  He specifically notices symptoms when working with physical therapy.  At the time of episode his blood pressure is hypotensive.  He denies known history of vertigo.  Patient also states about 2 to 3 weeks ago he experienced left arm numbness and jaw pain, similar symptoms to when he previously required cardiac intervention 12 years ago.  EKG today shows sinus rhythm with nonspecific ST-T wave changes, similar when compared to previous. patient currently denies chest pain, shortness of breath, fatigue, weakness, palpitations, edema, syncope.  All signs are stable today.  He takes all medications as prescribed and has never been a smoker.      The following portions of the patient's history were reviewed and updated as appropriate: allergies, current medications, past family history, past medical history, past social history, past surgical history, and problem list.    Past Medical History:   Diagnosis Date    Cellulitis 10/15/2013    Encounter for screening for malignant neoplasm of prostate 12/04/2017    Encounter for screening for other disorder 12/04/2017    Need for prophylactic  "vaccination against Streptococcus pneumoniae (pneumococcus) 10/19/2018    Onychomycosis 10/19/2018    Pain in right shoulder 10/03/2018    Stroke     TIA (transient ischemic attack) 01/05/2020     Past Surgical History:   Procedure Laterality Date    CARDIAC CATHETERIZATION N/A 6/9/2021    Procedure: Left Heart Cath and coronary angiogram;  Surgeon: Eligio Robles MD;  Location: [facility] CATH INVASIVE LOCATION;  Service: Cardiovascular;  Laterality: N/A;    CARDIAC CATHETERIZATION N/A 6/11/2021    Procedure: Percutaneous Coronary Intervention;  Surgeon: Eligio Robles MD;  Location: [facility] CATH INVASIVE LOCATION;  Service: Cardiovascular;  Laterality: N/A;   PTCA- LAD    CARDIAC CATHETERIZATION N/A 6/11/2021    Procedure: Stent TEJAL coronary;  Surgeon: Eligio Robles MD;  Location: [facility] CATH INVASIVE LOCATION;  Service: Cardiovascular;  Laterality: N/A;   LAD    CATARACT EXTRACTION WITH INTRAOCULAR LENS IMPLANT Bilateral     CORONARY ANGIOPLASTY WITH STENT PLACEMENT  2011    PTCA - LCx & RCA with stent    ROTATOR CUFF REPAIR Right     TOTAL SHOULDER REVERSE ARTHROPLASTY Right     after fracture 2018     /64 (BP Location: Left arm, Patient Position: Sitting, Cuff Size: Adult)   Pulse 75   Ht 175.3 cm (69\")   Wt 110 kg (243 lb 3.2 oz)   SpO2 98%   BMI 35.91 kg/m²   Family History   Problem Relation Age of Onset    Cerebral aneurysm Father     Peripheral vascular disease Brother     Diabetes type II Brother     Nephrotic syndrome Grandson     Nephrotic syndrome Granddaughter        Current Outpatient Medications:     acetaminophen (TYLENOL) 325 MG tablet, Take 2 tablets by mouth Every 4 (Four) Hours As Needed for Mild Pain  or Fever (temperature greater than 101F)., Disp: , Rfl:     ammonium lactate (LAC-HYDRIN) 12 % lotion, Apply  topically to the appropriate area as directed 2 (Two) Times a Day As Needed for Dry Skin., Disp: , Rfl:     aspirin 81 MG EC tablet, Take 1 tablet by mouth Daily., Disp: , Rfl:     " atorvastatin (LIPITOR) 80 MG tablet, Take 1 tablet by mouth Daily., Disp: , Rfl:     cephalexin (Keflex) 250 MG capsule, Take 1 capsule by mouth 3 (Three) Times a Day., Disp: 28 capsule, Rfl: 0    cloNIDine (CATAPRES) 0.2 MG tablet, Take 1 tablet by mouth 2 (Two) Times a Day., Disp: , Rfl:     clopidogrel (PLAVIX) 75 MG tablet, Take 1 tablet by mouth Daily., Disp: 90 tablet, Rfl: 3    Cyanocobalamin (B-12 Compliance Injection) 1000 MCG/ML kit, Inject 1,000 mcg under the skin into the appropriate area as directed Every 30 (Thirty) Days., Disp: 1 kit, Rfl:     docusate sodium (COLACE) 100 MG capsule, Take 1 capsule by mouth Daily., Disp: 90 capsule, Rfl: 0    escitalopram (LEXAPRO) 10 MG tablet, TAKE ONE TABLET BY MOUTH EVERY DAY, Disp: 30 tablet, Rfl: 5    HYDROcodone-acetaminophen (Norco) 5-325 MG per tablet, Take 1 tablet by mouth Every 6 (Six) Hours As Needed for Moderate Pain., Disp: 12 tablet, Rfl: 0    insulin aspart prot-insulin aspart (novoLOG 70/30) (70-30) 100 UNIT/ML injection, Inject 30 Units under the skin into the appropriate area as directed 2 (Two) Times a Day., Disp: , Rfl:     levETIRAcetam (Keppra) 500 MG tablet, Take 1 tablet by mouth 2 (Two) Times a Day., Disp: 60 tablet, Rfl: 5    levothyroxine (SYNTHROID) 25 MCG tablet, Take 1 tablet by mouth Daily., Disp: 30 tablet, Rfl: 5    losartan (COZAAR) 100 MG tablet, Take 1 tablet by mouth Daily., Disp: , Rfl:     metoprolol succinate XL (TOPROL-XL) 50 MG 24 hr tablet, Take 1 tablet by mouth Daily., Disp: , Rfl:     multivitamin with minerals tablet tablet, Take 1 tablet by mouth Daily., Disp: , Rfl:     nitroglycerin (NITROSTAT) 0.4 MG SL tablet, Place 1 tablet under the tongue Every 5 (Five) Minutes As Needed for Chest Pain. Take no more than 3 doses in 15 minutes., Disp: , Rfl:     NovoLOG 100 UNIT/ML injection, Inject 1,000 Units under the skin into the appropriate area as directed 3 (Three) Times a Day Before Meals., Disp: , Rfl:     polyethylene  glycol (MiraLax) 17 g packet, Take 17 g by mouth Daily As Needed., Disp: , Rfl:     tamsulosin (FLOMAX) 0.4 MG capsule 24 hr capsule, TAKE ONE CAPSULE BY MOUTH TWICE DAILY, Disp: 60 capsule, Rfl: 11    traMADol (ULTRAM) 50 MG tablet, Take 0.5 tablets by mouth Every 6 (Six) Hours As Needed for Moderate Pain., Disp: , Rfl:     vitamin D (ERGOCALCIFEROL) 1.25 MG (51849 UT) capsule capsule, Take 1 capsule by mouth 1 (One) Time Per Week. On Saturday, Disp: , Rfl:     Allergies   Allergen Reactions    Shellfish Allergy Nausea And Vomiting     Social History     Socioeconomic History    Marital status:    Tobacco Use    Smoking status: Never     Passive exposure: Never    Smokeless tobacco: Never   Vaping Use    Vaping status: Never Used   Substance and Sexual Activity    Alcohol use: Never    Drug use: Never    Sexual activity: Defer     Review of Systems   Constitutional: Negative for chills and malaise/fatigue.   Cardiovascular:  Negative for chest pain, dyspnea on exertion, leg swelling, near-syncope, palpitations and syncope.   Respiratory:  Negative for cough and shortness of breath.    Gastrointestinal:  Negative for abdominal pain, nausea and vomiting.   Neurological:  Positive for dizziness. Negative for headaches, light-headedness and weakness.   Psychiatric/Behavioral:  Negative for altered mental status.               Objective:     Vitals reviewed.   Constitutional:       Appearance: Not in distress. Obese.   Eyes:      Pupils: Pupils are equal, round, and reactive to light.   HENT:      Nose: Nose normal.    Mouth/Throat:      Pharynx: Oropharynx is clear.   Pulmonary:      Effort: Pulmonary effort is normal.      Breath sounds: Normal breath sounds.   Cardiovascular:      Normal rate. Regular rhythm.   Pulses:     Intact distal pulses.   Edema:     Peripheral edema absent.   Abdominal:      General: Bowel sounds are normal.      Palpations: Abdomen is soft.   Musculoskeletal: Normal range of motion.       Cervical back: Normal range of motion and neck supple. Skin:     General: Skin is warm and dry.   Neurological:      General: No focal deficit present.      Mental Status: Alert and oriented to person, place and time.       ECG 12 Lead    Date/Time: 5/22/2024 3:49 PM  Performed by: Tere Landeros APRN    Authorized by: Tere Landeros APRN  Comparison: compared with previous ECG from 6/4/2021  Rhythm: sinus rhythm  Rate: normal  Other findings: non-specific ST-T wave changes    Clinical impression: abnormal EKG      Lab Review:    Diagnosis Plan   1. Coronary artery disease involving native coronary artery of native heart without angina pectoris        2. Benign essential hypertension        3. Mixed hyperlipidemia        4. Type 2 diabetes mellitus with hyperglycemia, with long-term current use of insulin        5. Chronic kidney disease, stage 3b        6. Class 2 severe obesity with serious comorbidity and body mass index (BMI) of 35.0 to 35.9 in adult, unspecified obesity type        7. Cerebrovascular accident (CVA) due to embolism of posterior cerebral artery, unspecified blood vessel laterality        8. Non-ST elevation MI (NSTEMI)        LAB RESULTS (LAST 7 DAYS)    Echocardiogram   Results for orders placed during the hospital encounter of 06/04/21    Adult Transthoracic Echo Complete W/ Cont if Necessary Per Protocol    Interpretation Summary  · Left ventricular ejection fraction appears to be 61 - 65%.  · No pericardial effusion noted      CBC        BMP        CMP         BNP        TROPONIN        CoAg        Creatinine Clearance  CrCl cannot be calculated (Patient's most recent lab result is older than the maximum 30 days allowed.).    ABG        Radiology  No radiology results for the last day          MDM     Lightheadedness/dizziness  Patient has been experiencing episodes of lightheadedness/dizziness with exertion  Denies syncopal episode  Reports blood pressure drops on position  change  Recommend orthostatic blood pressures obtain when working with PT  EKG without evidence of arrhythmia  Symptoms consistent with vertigo, however cardiac disease cannot be ruled out  Patient refusing echocardiogram to evaluate LVEF and valvular abnormalities  Patient refusing Myoview study    CAD  S/p PCI to LAD and diagonal branch (2021)   Cardiac catheterization from 2021 shows severe three-vessel disease, patient deemed not a candidate coronary artery bypass at that time  EKG today shows sinus rhythm with nonspecific ST-T segment changes  Currently denies chest pain  Sublingual nitroglycerin as needed  Continue aspirin, Plavix, beta-blocker, high intensity statin  Patient declines Myoview study at this time    Hypertension  Blood pressure today 130/64  Continue metoprolol succinate 50 mg, losartan 100 mg, clonidine 0.2 mg    Hyperlipidemia  Continue high intensity statin therapy    Type 2 diabetes  Currently on insulin therapy  Recent A1c 5.8    CKD stage III  Follows with nephrology  Recent creatinine 1.8    History of CVA  History of seizures  Continue DAPT, high intensity statin therapy  On Keppra      Patient's previous medical records, labs, and EKG were reviewed and discussed with the patient at today's visit.     Patient to be seen as needed or in 6 months with Dr. Robles     Electronically signed by NILS Mane, 05/22/24, 3:51 PM EDT.

## 2024-11-26 ENCOUNTER — OFFICE VISIT (OUTPATIENT)
Dept: CARDIOLOGY | Facility: CLINIC | Age: 69
End: 2024-11-26
Payer: MEDICARE

## 2024-11-26 VITALS
DIASTOLIC BLOOD PRESSURE: 81 MMHG | HEART RATE: 80 BPM | OXYGEN SATURATION: 97 % | WEIGHT: 245.5 LBS | HEIGHT: 69 IN | SYSTOLIC BLOOD PRESSURE: 132 MMHG | BODY MASS INDEX: 36.36 KG/M2

## 2024-11-26 DIAGNOSIS — E11.65 TYPE 2 DIABETES MELLITUS WITH HYPERGLYCEMIA, WITH LONG-TERM CURRENT USE OF INSULIN: ICD-10-CM

## 2024-11-26 DIAGNOSIS — I25.10 CORONARY ARTERY DISEASE INVOLVING NATIVE CORONARY ARTERY OF NATIVE HEART WITHOUT ANGINA PECTORIS: Primary | ICD-10-CM

## 2024-11-26 DIAGNOSIS — I10 BENIGN ESSENTIAL HYPERTENSION: ICD-10-CM

## 2024-11-26 DIAGNOSIS — Z79.4 TYPE 2 DIABETES MELLITUS WITH HYPERGLYCEMIA, WITH LONG-TERM CURRENT USE OF INSULIN: ICD-10-CM

## 2024-11-26 DIAGNOSIS — E78.2 MIXED HYPERLIPIDEMIA: ICD-10-CM

## 2024-11-26 PROCEDURE — 3079F DIAST BP 80-89 MM HG: CPT | Performed by: INTERNAL MEDICINE

## 2024-11-26 PROCEDURE — 3075F SYST BP GE 130 - 139MM HG: CPT | Performed by: INTERNAL MEDICINE

## 2024-11-26 PROCEDURE — 99214 OFFICE O/P EST MOD 30 MIN: CPT | Performed by: INTERNAL MEDICINE

## 2024-11-26 PROCEDURE — 1160F RVW MEDS BY RX/DR IN RCRD: CPT | Performed by: INTERNAL MEDICINE

## 2024-11-26 PROCEDURE — 1159F MED LIST DOCD IN RCRD: CPT | Performed by: INTERNAL MEDICINE

## 2024-11-26 NOTE — PROGRESS NOTES
"    Subjective:     Encounter Date:11/26/2024      Patient ID: Isaias Molina is a 69 y.o. male.    Chief Complaint:  History of Present Illness 69-year-old white male with history of coronary disease hypertension hyperlipidemia and diabetes presents to my office for a follow-up.    The following portions of the patient's history were reviewed and updated as appropriate: allergies, current medications, past family history, past medical history, past social history, past surgical history, and problem list.  Past Medical History:   Diagnosis Date    Cellulitis 10/15/2013    Encounter for screening for malignant neoplasm of prostate 12/04/2017    Encounter for screening for other disorder 12/04/2017    Need for prophylactic vaccination against Streptococcus pneumoniae (pneumococcus) 10/19/2018    Onychomycosis 10/19/2018    Pain in right shoulder 10/03/2018    Stroke     TIA (transient ischemic attack) 01/05/2020     Past Surgical History:   Procedure Laterality Date    CARDIAC CATHETERIZATION N/A 6/9/2021    Procedure: Left Heart Cath and coronary angiogram;  Surgeon: Eligio Robles MD;  Location: [facility] CATH INVASIVE LOCATION;  Service: Cardiovascular;  Laterality: N/A;    CARDIAC CATHETERIZATION N/A 6/11/2021    Procedure: Percutaneous Coronary Intervention;  Surgeon: Eligio Robles MD;  Location: [facility] CATH INVASIVE LOCATION;  Service: Cardiovascular;  Laterality: N/A;   PTCA- LAD    CARDIAC CATHETERIZATION N/A 6/11/2021    Procedure: Stent TEJAL coronary;  Surgeon: Eligio Robles MD;  Location: [facility] CATH INVASIVE LOCATION;  Service: Cardiovascular;  Laterality: N/A;   LAD    CATARACT EXTRACTION WITH INTRAOCULAR LENS IMPLANT Bilateral     CORONARY ANGIOPLASTY WITH STENT PLACEMENT  2011    PTCA - LCx & RCA with stent    ROTATOR CUFF REPAIR Right     TOTAL SHOULDER REVERSE ARTHROPLASTY Right     after fracture 2018     /81   Pulse 80   Ht 175.3 cm (69\")   Wt 111 kg (245 lb 8 oz)   SpO2 97%   BMI 36.25 " kg/m²   Family History   Problem Relation Age of Onset    Cerebral aneurysm Father     Peripheral vascular disease Brother     Diabetes type II Brother     Nephrotic syndrome Grandson     Nephrotic syndrome Granddaughter        Current Outpatient Medications:     acetaminophen (TYLENOL) 325 MG tablet, Take 2 tablets by mouth Every 4 (Four) Hours As Needed for Mild Pain  or Fever (temperature greater than 101F)., Disp: , Rfl:     ammonium lactate (LAC-HYDRIN) 12 % lotion, Apply  topically to the appropriate area as directed 2 (Two) Times a Day As Needed for Dry Skin., Disp: , Rfl:     aspirin 81 MG EC tablet, Take 1 tablet by mouth Daily., Disp: , Rfl:     atorvastatin (LIPITOR) 80 MG tablet, Take 1 tablet by mouth Daily., Disp: , Rfl:     cloNIDine (CATAPRES) 0.2 MG tablet, Take 1 tablet by mouth 2 (Two) Times a Day., Disp: , Rfl:     clopidogrel (PLAVIX) 75 MG tablet, Take 1 tablet by mouth Daily., Disp: 90 tablet, Rfl: 3    Cyanocobalamin (B-12 Compliance Injection) 1000 MCG/ML kit, Inject 1,000 mcg under the skin into the appropriate area as directed Every 30 (Thirty) Days., Disp: 1 kit, Rfl:     docusate sodium (COLACE) 100 MG capsule, Take 1 capsule by mouth Daily., Disp: 90 capsule, Rfl: 0    escitalopram (LEXAPRO) 10 MG tablet, TAKE ONE TABLET BY MOUTH EVERY DAY, Disp: 30 tablet, Rfl: 5    HYDROcodone-acetaminophen (Norco) 5-325 MG per tablet, Take 1 tablet by mouth Every 6 (Six) Hours As Needed for Moderate Pain., Disp: 12 tablet, Rfl: 0    insulin aspart prot-insulin aspart (novoLOG 70/30) (70-30) 100 UNIT/ML injection, Inject 30 Units under the skin into the appropriate area as directed 2 (Two) Times a Day., Disp: , Rfl:     levETIRAcetam (Keppra) 500 MG tablet, Take 1 tablet by mouth 2 (Two) Times a Day., Disp: 60 tablet, Rfl: 5    levothyroxine (SYNTHROID) 25 MCG tablet, Take 1 tablet by mouth Daily., Disp: 30 tablet, Rfl: 5    losartan (COZAAR) 100 MG tablet, Take 1 tablet by mouth Daily., Disp: , Rfl:      metoprolol succinate XL (TOPROL-XL) 50 MG 24 hr tablet, Take 1 tablet by mouth Daily., Disp: , Rfl:     multivitamin with minerals tablet tablet, Take 1 tablet by mouth Daily., Disp: , Rfl:     nitroglycerin (NITROSTAT) 0.4 MG SL tablet, Place 1 tablet under the tongue Every 5 (Five) Minutes As Needed for Chest Pain. Take no more than 3 doses in 15 minutes., Disp: , Rfl:     NovoLOG 100 UNIT/ML injection, Inject 1,000 Units under the skin into the appropriate area as directed 3 (Three) Times a Day Before Meals., Disp: , Rfl:     polyethylene glycol (MiraLax) 17 g packet, Take 17 g by mouth Daily As Needed., Disp: , Rfl:     Sennosides (SENNA-EX PO), Take  by mouth., Disp: , Rfl:     tamsulosin (FLOMAX) 0.4 MG capsule 24 hr capsule, TAKE ONE CAPSULE BY MOUTH TWICE DAILY, Disp: 60 capsule, Rfl: 11    traMADol (ULTRAM) 50 MG tablet, Take 0.5 tablets by mouth Every 6 (Six) Hours As Needed for Moderate Pain., Disp: , Rfl:     vitamin D (ERGOCALCIFEROL) 1.25 MG (31055 UT) capsule capsule, Take 1 capsule by mouth 1 (One) Time Per Week. On Saturday, Disp: , Rfl:     cephalexin (Keflex) 250 MG capsule, Take 1 capsule by mouth 3 (Three) Times a Day. (Patient not taking: Reported on 11/26/2024), Disp: 28 capsule, Rfl: 0  Allergies   Allergen Reactions    Shellfish Allergy Nausea And Vomiting     Social History     Socioeconomic History    Marital status:    Tobacco Use    Smoking status: Never     Passive exposure: Never    Smokeless tobacco: Never   Vaping Use    Vaping status: Never Used   Substance and Sexual Activity    Alcohol use: Never    Drug use: Never    Sexual activity: Defer     Review of Systems   Constitutional: Negative for malaise/fatigue.   Cardiovascular:  Negative for chest pain, dyspnea on exertion, leg swelling and palpitations.   Respiratory:  Negative for cough and shortness of breath.    Gastrointestinal:  Negative for abdominal pain, nausea and vomiting.   Neurological:  Negative for  dizziness, focal weakness, headaches, light-headedness and numbness.   All other systems reviewed and are negative.             Objective:     Constitutional:       Appearance: Well-developed.   Eyes:      General: No scleral icterus.     Conjunctiva/sclera: Conjunctivae normal.   HENT:      Head: Normocephalic and atraumatic.   Neck:      Vascular: No carotid bruit or JVD.   Pulmonary:      Effort: Pulmonary effort is normal.      Breath sounds: Normal breath sounds. No wheezing. No rales.   Cardiovascular:      Normal rate. Regular rhythm.   Pulses:     Intact distal pulses.   Abdominal:      General: Bowel sounds are normal.      Palpations: Abdomen is soft.   Musculoskeletal:      Cervical back: Normal range of motion and neck supple. Skin:     General: Skin is warm and dry.      Findings: No rash.   Neurological:      Mental Status: Alert.       Procedures    Lab Review:         MDM    #1 coronary disease  Patient had an MI and stent placement to the LAD and RCA in the past and is currently stable on medical therapy including aspirin and Plavix and statins  2.  Hypertension  Patient blood pressure currently stable on metoprolol and losartan  3.  Diabetes  Patient is on insulin and followed by primary care doctor  4.  Hyperlipidemia  Patient is on atorvastatin the lipid levels are well within normal limits    Patient's previous medical records, labs, and EKG were reviewed and discussed with the patient at today's visit.

## 2025-06-17 ENCOUNTER — OFFICE VISIT (OUTPATIENT)
Dept: CARDIOLOGY | Facility: CLINIC | Age: 70
End: 2025-06-17
Payer: MEDICARE

## 2025-06-17 VITALS
BODY MASS INDEX: 35.84 KG/M2 | WEIGHT: 242 LBS | HEIGHT: 69 IN | SYSTOLIC BLOOD PRESSURE: 135 MMHG | OXYGEN SATURATION: 98 % | DIASTOLIC BLOOD PRESSURE: 83 MMHG | HEART RATE: 75 BPM

## 2025-06-17 DIAGNOSIS — I10 BENIGN ESSENTIAL HYPERTENSION: ICD-10-CM

## 2025-06-17 DIAGNOSIS — E11.65 TYPE 2 DIABETES MELLITUS WITH HYPERGLYCEMIA, WITH LONG-TERM CURRENT USE OF INSULIN: ICD-10-CM

## 2025-06-17 DIAGNOSIS — I25.10 CORONARY ARTERY DISEASE INVOLVING NATIVE CORONARY ARTERY OF NATIVE HEART WITHOUT ANGINA PECTORIS: Primary | ICD-10-CM

## 2025-06-17 DIAGNOSIS — E78.2 MIXED HYPERLIPIDEMIA: ICD-10-CM

## 2025-06-17 DIAGNOSIS — Z79.4 TYPE 2 DIABETES MELLITUS WITH HYPERGLYCEMIA, WITH LONG-TERM CURRENT USE OF INSULIN: ICD-10-CM

## 2025-06-17 PROCEDURE — 3079F DIAST BP 80-89 MM HG: CPT | Performed by: INTERNAL MEDICINE

## 2025-06-17 PROCEDURE — 1159F MED LIST DOCD IN RCRD: CPT | Performed by: INTERNAL MEDICINE

## 2025-06-17 PROCEDURE — 3075F SYST BP GE 130 - 139MM HG: CPT | Performed by: INTERNAL MEDICINE

## 2025-06-17 PROCEDURE — 1160F RVW MEDS BY RX/DR IN RCRD: CPT | Performed by: INTERNAL MEDICINE

## 2025-06-17 PROCEDURE — 99214 OFFICE O/P EST MOD 30 MIN: CPT | Performed by: INTERNAL MEDICINE

## 2025-06-17 RX ORDER — SODIUM BICARBONATE 650 MG/1
650 TABLET ORAL DAILY
COMMUNITY

## 2025-06-17 RX ORDER — CARBOXYMETHYLCELLULOSE SODIUM 5 MG/ML
2 SOLUTION/ DROPS OPHTHALMIC DAILY PRN
COMMUNITY

## 2025-06-17 RX ORDER — ESCITALOPRAM OXALATE 5 MG/1
5 TABLET ORAL DAILY
COMMUNITY

## 2025-06-17 RX ORDER — CLONIDINE HYDROCHLORIDE 0.1 MG/1
0.1 TABLET ORAL 2 TIMES DAILY
COMMUNITY

## 2025-06-17 RX ORDER — MELATONIN 3 MG
3 CAPSULE ORAL
COMMUNITY

## 2025-06-17 NOTE — PROGRESS NOTES
Subjective:     Encounter Date:06/17/2025      Patient ID: Isaias Molina is a 69 y.o. male.    Chief Complaint:  History of Present Illness 69-year-old white male with history of coronary disease hypertension hyperlipidemia and diabetes presents to the office for a follow-up.  Patient is currently stable without any symptoms of chest pain but has dizziness.  No compressively shortness of breath palpitations syncope or swelling of the feet.  He is taking his medicines regular.  He does not smoke.  He is currently resident of a nursing facility.    The following portions of the patient's history were reviewed and updated as appropriate: allergies, current medications, past family history, past medical history, past social history, past surgical history, and problem list.  Past Medical History:   Diagnosis Date    Cellulitis 10/15/2013    Encounter for screening for malignant neoplasm of prostate 12/04/2017    Encounter for screening for other disorder 12/04/2017    Need for prophylactic vaccination against Streptococcus pneumoniae (pneumococcus) 10/19/2018    Onychomycosis 10/19/2018    Pain in right shoulder 10/03/2018    Stroke     TIA (transient ischemic attack) 01/05/2020     Past Surgical History:   Procedure Laterality Date    CARDIAC CATHETERIZATION N/A 6/9/2021    Procedure: Left Heart Cath and coronary angiogram;  Surgeon: Eligio Robles MD;  Location: [facility] CATH INVASIVE LOCATION;  Service: Cardiovascular;  Laterality: N/A;    CARDIAC CATHETERIZATION N/A 6/11/2021    Procedure: Percutaneous Coronary Intervention;  Surgeon: Eligio Robles MD;  Location: [facility] CATH INVASIVE LOCATION;  Service: Cardiovascular;  Laterality: N/A;   PTCA- LAD    CARDIAC CATHETERIZATION N/A 6/11/2021    Procedure: Stent TEJAL coronary;  Surgeon: Eligio Robles MD;  Location: [facility] CATH INVASIVE LOCATION;  Service: Cardiovascular;  Laterality: N/A;   LAD    CATARACT EXTRACTION WITH INTRAOCULAR LENS IMPLANT Bilateral      "CORONARY ANGIOPLASTY WITH STENT PLACEMENT  2011    PTCA - LCx & RCA with stent    ROTATOR CUFF REPAIR Right     TOTAL SHOULDER REVERSE ARTHROPLASTY Right     after fracture 2018     /83 (BP Location: Right arm, Patient Position: Sitting, Cuff Size: Adult)   Pulse 75   Ht 175.3 cm (69\")   Wt 110 kg (242 lb)   SpO2 98%   BMI 35.74 kg/m²   Family History   Problem Relation Age of Onset    Cerebral aneurysm Father     Peripheral vascular disease Brother     Diabetes type II Brother     Nephrotic syndrome Grandson     Nephrotic syndrome Granddaughter        Current Outpatient Medications:     acetaminophen (TYLENOL) 325 MG tablet, Take 2 tablets by mouth Every 4 (Four) Hours As Needed for Mild Pain  or Fever (temperature greater than 101F)., Disp: , Rfl:     ammonium lactate (LAC-HYDRIN) 12 % lotion, Apply  topically to the appropriate area as directed 2 (Two) Times a Day As Needed for Dry Skin., Disp: , Rfl:     aspirin 81 MG EC tablet, Take 1 tablet by mouth Daily., Disp: , Rfl:     atorvastatin (LIPITOR) 80 MG tablet, Take 1 tablet by mouth Daily., Disp: , Rfl:     carboxymethylcellulose (REFRESH PLUS) 0.5 % solution, Administer 2 drops to both eyes Daily As Needed for Dry Eyes., Disp: , Rfl:     cloNIDine (CATAPRES) 0.1 MG tablet, Take 1 tablet by mouth 2 (Two) Times a Day., Disp: , Rfl:     clopidogrel (PLAVIX) 75 MG tablet, Take 1 tablet by mouth Daily., Disp: 90 tablet, Rfl: 3    Cyanocobalamin (B-12 Compliance Injection) 1000 MCG/ML kit, Inject 1,000 mcg under the skin into the appropriate area as directed Every 30 (Thirty) Days., Disp: 1 kit, Rfl:     empagliflozin (JARDIANCE) 10 MG tablet tablet, Take 1 tablet by mouth Daily., Disp: , Rfl:     escitalopram (LEXAPRO) 5 MG tablet, Take 1 tablet by mouth Daily., Disp: , Rfl:     insulin aspart prot-insulin aspart (novoLOG 70/30) (70-30) 100 UNIT/ML injection, Inject 25 Units under the skin into the appropriate area as directed 2 (Two) Times a Day With " Meals., Disp: , Rfl:     levETIRAcetam (Keppra) 500 MG tablet, Take 1 tablet by mouth 2 (Two) Times a Day., Disp: 60 tablet, Rfl: 5    levothyroxine (SYNTHROID) 25 MCG tablet, Take 1 tablet by mouth Daily., Disp: 30 tablet, Rfl: 5    losartan (COZAAR) 100 MG tablet, Take 1 tablet by mouth Daily., Disp: , Rfl:     Melatonin 3 MG capsule, Take 1 capsule by mouth every night at bedtime., Disp: , Rfl:     metoprolol succinate XL (TOPROL-XL) 50 MG 24 hr tablet, Take 1 tablet by mouth Daily., Disp: , Rfl:     multivitamin with minerals tablet tablet, Take 1 tablet by mouth Daily., Disp: , Rfl:     nitroglycerin (NITROSTAT) 0.4 MG SL tablet, Place 1 tablet under the tongue Every 5 (Five) Minutes As Needed for Chest Pain. Take no more than 3 doses in 15 minutes., Disp: , Rfl:     polyethylene glycol (MiraLax) 17 g packet, Take 17 g by mouth Daily As Needed., Disp: , Rfl:     Sennosides (SENNA-EX PO), Take  by mouth., Disp: , Rfl:     sodium bicarbonate 650 MG tablet, Take 1 tablet by mouth Daily., Disp: , Rfl:     tamsulosin (FLOMAX) 0.4 MG capsule 24 hr capsule, TAKE ONE CAPSULE BY MOUTH TWICE DAILY, Disp: 60 capsule, Rfl: 11    traMADol (ULTRAM) 50 MG tablet, Take 0.5 tablets by mouth Every 6 (Six) Hours As Needed for Moderate Pain., Disp: , Rfl:     vitamin D (ERGOCALCIFEROL) 1.25 MG (16606 UT) capsule capsule, Take 1 capsule by mouth 1 (One) Time Per Week. On Saturday, Disp: , Rfl:   Allergies   Allergen Reactions    Shellfish Allergy Nausea And Vomiting     Social History     Socioeconomic History    Marital status:    Tobacco Use    Smoking status: Never     Passive exposure: Never    Smokeless tobacco: Never   Vaping Use    Vaping status: Never Used   Substance and Sexual Activity    Alcohol use: Never    Drug use: Never    Sexual activity: Defer     Review of Systems   Constitutional: Negative for malaise/fatigue.   Cardiovascular:  Negative for chest pain, dyspnea on exertion, leg swelling and palpitations.    Respiratory:  Negative for cough and shortness of breath.    Gastrointestinal:  Negative for abdominal pain, nausea and vomiting.   Neurological:  Positive for dizziness and light-headedness. Negative for headaches, numbness and weakness.   All other systems reviewed and are negative.             Objective:     Constitutional:       Appearance: Well-developed.   Eyes:      General: No scleral icterus.     Conjunctiva/sclera: Conjunctivae normal.   HENT:      Head: Normocephalic and atraumatic.   Neck:      Vascular: No carotid bruit or JVD.   Pulmonary:      Effort: Pulmonary effort is normal.      Breath sounds: Normal breath sounds. No wheezing. No rales.   Cardiovascular:      Normal rate. Regular rhythm.   Pulses:     Intact distal pulses.   Abdominal:      General: Bowel sounds are normal.      Palpations: Abdomen is soft.   Musculoskeletal:      Cervical back: Normal range of motion and neck supple. Skin:     General: Skin is warm and dry.      Findings: No rash.   Neurological:      Mental Status: Alert.       Procedures    Lab Review:         MDM    Coronary disease  Patient had severe three-vessel coronary disease but only had a stent placement to the LAD and is currently stable without any angina but will check an echocardiogram for LV function.    Hypertension  Patient blood pressure currently stable on metoprolol and losartan    Hyperlipidemia  Patient on atorvastatin and the lipid levels are well within normal limits    Diabetes  Patient is on insulin    Patient's previous medical records, labs, and EKG were reviewed and discussed with the patient at today's visit.

## 2025-07-08 ENCOUNTER — HOSPITAL ENCOUNTER (OUTPATIENT)
Dept: CARDIOLOGY | Facility: HOSPITAL | Age: 70
Discharge: HOME | End: 2025-07-08
Admitting: INTERNAL MEDICINE
Payer: MEDICARE

## 2025-07-08 VITALS
SYSTOLIC BLOOD PRESSURE: 98 MMHG | HEART RATE: 72 BPM | WEIGHT: 240 LBS | HEIGHT: 69 IN | BODY MASS INDEX: 35.55 KG/M2 | DIASTOLIC BLOOD PRESSURE: 68 MMHG

## 2025-07-08 DIAGNOSIS — Z79.4 TYPE 2 DIABETES MELLITUS WITH HYPERGLYCEMIA, WITH LONG-TERM CURRENT USE OF INSULIN: ICD-10-CM

## 2025-07-08 DIAGNOSIS — E78.2 MIXED HYPERLIPIDEMIA: ICD-10-CM

## 2025-07-08 DIAGNOSIS — I10 BENIGN ESSENTIAL HYPERTENSION: ICD-10-CM

## 2025-07-08 DIAGNOSIS — E11.65 TYPE 2 DIABETES MELLITUS WITH HYPERGLYCEMIA, WITH LONG-TERM CURRENT USE OF INSULIN: ICD-10-CM

## 2025-07-08 DIAGNOSIS — I25.10 CORONARY ARTERY DISEASE INVOLVING NATIVE CORONARY ARTERY OF NATIVE HEART WITHOUT ANGINA PECTORIS: ICD-10-CM

## 2025-07-08 LAB
AORTIC DIMENSIONLESS INDEX: 0.89 (DI)
AV MEAN PRESS GRAD SYS DOP V1V2: 2.9 MMHG
AV VMAX SYS DOP: 114.2 CM/SEC
BH CV ECHO LEFT VENTRICLE GLOBAL LONGITUDINAL STRAIN: -14.9 %
BH CV ECHO MEAS - AO MAX PG: 5.2 MMHG
BH CV ECHO MEAS - AO ROOT DIAM: 3.7 CM
BH CV ECHO MEAS - AO V2 VTI: 21 CM
BH CV ECHO MEAS - AVA(I,D): 3.5 CM2
BH CV ECHO MEAS - EDV(CUBED): 52 ML
BH CV ECHO MEAS - EDV(MOD-SP4): 70.6 ML
BH CV ECHO MEAS - EF(MOD-SP4): 66.3 %
BH CV ECHO MEAS - ESV(CUBED): 18.9 ML
BH CV ECHO MEAS - ESV(MOD-SP4): 23.8 ML
BH CV ECHO MEAS - FS: 28.7 %
BH CV ECHO MEAS - IVS/LVPW: 1.19 CM
BH CV ECHO MEAS - IVSD: 1.26 CM
BH CV ECHO MEAS - LA DIMENSION: 2.8 CM
BH CV ECHO MEAS - LAT PEAK E' VEL: 5.9 CM/SEC
BH CV ECHO MEAS - LV DIASTOLIC VOL/BSA (35-75): 31.6 CM2
BH CV ECHO MEAS - LV MASS(C)D: 141 GRAMS
BH CV ECHO MEAS - LV MAX PG: 3.8 MMHG
BH CV ECHO MEAS - LV MEAN PG: 2.06 MMHG
BH CV ECHO MEAS - LV SYSTOLIC VOL/BSA (12-30): 10.7 CM2
BH CV ECHO MEAS - LV V1 MAX: 96.9 CM/SEC
BH CV ECHO MEAS - LV V1 VTI: 18.7 CM
BH CV ECHO MEAS - LVIDD: 3.7 CM
BH CV ECHO MEAS - LVIDS: 2.7 CM
BH CV ECHO MEAS - LVOT AREA: 4 CM2
BH CV ECHO MEAS - LVOT DIAM: 2.25 CM
BH CV ECHO MEAS - LVPWD: 1.05 CM
BH CV ECHO MEAS - MED PEAK E' VEL: 3.8 CM/SEC
BH CV ECHO MEAS - MV A MAX VEL: 105.5 CM/SEC
BH CV ECHO MEAS - MV DEC SLOPE: 172.3 CM/SEC2
BH CV ECHO MEAS - MV DEC TIME: 0.37 SEC
BH CV ECHO MEAS - MV E MAX VEL: 63.1 CM/SEC
BH CV ECHO MEAS - MV E/A: 0.6
BH CV ECHO MEAS - MV MAX PG: 4.3 MMHG
BH CV ECHO MEAS - MV MEAN PG: 1.26 MMHG
BH CV ECHO MEAS - MV V2 VTI: 26.9 CM
BH CV ECHO MEAS - MVA(VTI): 2.8 CM2
BH CV ECHO MEAS - PA ACC TIME: 0.02 SEC
BH CV ECHO MEAS - PA V2 MAX: 68.2 CM/SEC
BH CV ECHO MEAS - PI END-D VEL: 141 CM/SEC
BH CV ECHO MEAS - PULM A REVS DUR: 0.16 SEC
BH CV ECHO MEAS - PULM A REVS VEL: 25.5 CM/SEC
BH CV ECHO MEAS - PULM DIAS VEL: 30.6 CM/SEC
BH CV ECHO MEAS - PULM S/D: 1.52
BH CV ECHO MEAS - PULM SYS VEL: 46.4 CM/SEC
BH CV ECHO MEAS - QP/QS: 0.77
BH CV ECHO MEAS - RAP SYSTOLE: 3 MMHG
BH CV ECHO MEAS - RV MAX PG: 1.41 MMHG
BH CV ECHO MEAS - RV V1 MAX: 59.4 CM/SEC
BH CV ECHO MEAS - RV V1 VTI: 10.5 CM
BH CV ECHO MEAS - RVDD: 2.6 CM
BH CV ECHO MEAS - RVOT DIAM: 2.6 CM
BH CV ECHO MEAS - SV(LVOT): 74.4 ML
BH CV ECHO MEAS - SV(MOD-SP4): 46.8 ML
BH CV ECHO MEAS - SV(RVOT): 57.3 ML
BH CV ECHO MEAS - SVI(LVOT): 33.3 ML/M2
BH CV ECHO MEAS - SVI(MOD-SP4): 21 ML/M2
BH CV ECHO MEAS - TAPSE (>1.6): 1.72 CM
BH CV ECHO MEASUREMENTS AVERAGE E/E' RATIO: 13.01
LV EF BIPLANE MOD: 66.3 %

## 2025-07-08 PROCEDURE — 93356 MYOCRD STRAIN IMG SPCKL TRCK: CPT

## 2025-07-08 PROCEDURE — 93306 TTE W/DOPPLER COMPLETE: CPT

## (undated) DEVICE — 6F .070 XB 3.5 100CM: Brand: VISTA BRITE TIP

## (undated) DEVICE — CATH DIAG IMPULSE FR4 6F 100CM

## (undated) DEVICE — PK TRY HEART CATH 50

## (undated) DEVICE — TBG NAMIC PRESS MONTR A/ F/M 12IN

## (undated) DEVICE — DEV INFL COMPAK W/ACCESSPLUS IN4530

## (undated) DEVICE — CATH DIAG IMPULSE FL4 6F 100CM

## (undated) DEVICE — BALN EUPHORA 2.5X20MM

## (undated) DEVICE — BALN EUPHORA 2.5X10MM

## (undated) DEVICE — PINNACLE INTRODUCER SHEATH: Brand: PINNACLE

## (undated) DEVICE — GW DIAG EMERALD HEPCOAT MOVE JTIP STD .035 3MM 150CM

## (undated) DEVICE — ELECTRD DEFIB M/FUNC PROPADZ RADIOL 2PK

## (undated) DEVICE — CONTRST ISOVUE300 61PCT 50ML

## (undated) DEVICE — SYR LL TP 10ML STRL

## (undated) DEVICE — CATH DIAG IMPULSE PIG .056 6F 110CM

## (undated) DEVICE — HI-TORQUE WHISPER MS GUIDE WIRE .014 STRAIGHT TIP 3.0 CM X 190 CM: Brand: HI-TORQUE WHISPER